# Patient Record
Sex: FEMALE | Race: WHITE | NOT HISPANIC OR LATINO | Employment: FULL TIME | URBAN - NONMETROPOLITAN AREA
[De-identification: names, ages, dates, MRNs, and addresses within clinical notes are randomized per-mention and may not be internally consistent; named-entity substitution may affect disease eponyms.]

---

## 2017-06-24 ENCOUNTER — HOSPITAL ENCOUNTER (EMERGENCY)
Facility: HOSPITAL | Age: 32
Discharge: HOME | End: 2017-06-24
Attending: EMERGENCY MEDICINE | Admitting: EMERGENCY MEDICINE

## 2017-06-24 VITALS
HEIGHT: 63 IN | WEIGHT: 190 LBS | DIASTOLIC BLOOD PRESSURE: 89 MMHG | TEMPERATURE: 98.5 F | BODY MASS INDEX: 33.66 KG/M2 | RESPIRATION RATE: 22 BRPM | SYSTOLIC BLOOD PRESSURE: 122 MMHG | OXYGEN SATURATION: 96 % | HEART RATE: 93 BPM

## 2017-06-24 DIAGNOSIS — T40.1X1A HEROIN OVERDOSE, ACCIDENTAL OR UNINTENTIONAL, INITIAL ENCOUNTER (HCC): Primary | ICD-10-CM

## 2017-06-24 PROCEDURE — 99283 EMERGENCY DEPT VISIT LOW MDM: CPT

## 2017-06-24 RX ORDER — DIVALPROEX SODIUM 500 MG/1
500 TABLET, DELAYED RELEASE ORAL 2 TIMES DAILY
Qty: 30 TABLET | Refills: 0 | Status: SHIPPED | OUTPATIENT
Start: 2017-06-24 | End: 2021-02-09

## 2017-06-24 RX ORDER — DIVALPROEX SODIUM 500 MG/1
500 TABLET, DELAYED RELEASE ORAL 2 TIMES DAILY
COMMUNITY
End: 2017-06-24

## 2017-06-24 NOTE — ED PROVIDER NOTES
Subjective   History of Present Illness   31F w/ hx of bipolar d/o, prior substance abuse bib ems after heroin overdose.  Patient states that she did heroin for the first time in 10 months.  EMS found unresponsive with agonal breathing and gave 2 mg of Narcan she became alert, oriented and appropriate within 5 minutes.  She denies any other ingestions or complaints.  However, she does state that she takes Depakote daily for seizures and bipolar disorder and ran out 2 days ago.  Has not had any seizures.  Unable to see a new provider for 2 weeks.  She asked if they could fill a prescription for her and they said they will have to see her first.    Review of Systems   Psychiatric/Behavioral: Positive for behavioral problems.   All other systems reviewed and are negative.      Past Medical History:   Diagnosis Date   • Bipolar affective disorder, manic    • Hepatitis C    • Seizures    • Tachycardia    • White matter abnormality on MRI of brain        Allergies   Allergen Reactions   • Iodine Hives   • Latex Hives       Past Surgical History:   Procedure Laterality Date   • DILATATION AND CURETTAGE     • TONSILLECTOMY     • TUBAL ABDOMINAL LIGATION         History reviewed. No pertinent family history.    Social History     Social History   • Marital status: N/A     Spouse name: N/A   • Number of children: N/A   • Years of education: N/A     Social History Main Topics   • Smoking status: Current Every Day Smoker     Packs/day: 0.50     Types: Cigarettes   • Smokeless tobacco: None   • Alcohol use No   • Drug use: Yes     Special: IV      Comment: been clean from heroin for 10mths, relapsed today.    • Sexual activity: Not Asked     Other Topics Concern   • None     Social History Narrative   • None           Objective   Physical Exam   Constitutional: She is oriented to person, place, and time. She appears well-developed and well-nourished. No distress.   HENT:   Head: Normocephalic.   Mouth/Throat: Oropharynx is clear  and moist.   Eyes: Pupils are equal, round, and reactive to light. No scleral icterus.   Neck: Neck supple. No tracheal deviation present.   Cardiovascular: Normal rate, regular rhythm, normal heart sounds and intact distal pulses.  Exam reveals no gallop and no friction rub.    No murmur heard.  Pulmonary/Chest: Effort normal and breath sounds normal. No stridor. No respiratory distress. She has no wheezes. She has no rales. She exhibits no tenderness.   Abdominal: Soft. She exhibits no distension and no mass. There is no tenderness. There is no rebound and no guarding. No hernia.   Musculoskeletal: She exhibits no edema or deformity.   Neurological: She is alert and oriented to person, place, and time.   Skin: Skin is warm and dry. She is not diaphoretic. No erythema. No pallor.   Psychiatric: She has a normal mood and affect. Her behavior is normal.   Nursing note and vitals reviewed.      Procedures         ED Course  ED Course                  MDM   31-year-old female here after heroin overdose.  Afebrile, vital signs stable.  Appears well.  Will monitor further and if patient remains stable will discharge home.  We'll give a prescription for Depakote as well.    2:13 PM PT has remained stable, awake, alert and oriented and from the bathroom without difficulty.  Tolerating by mouth.  We'll discharge home with strong recommendation not to use heroin any longer.  Discussed strict return to care precautions.    Final diagnoses:   Heroin overdose, accidental or unintentional, initial encounter            Suhail Suarez MD  06/24/17 9362

## 2017-08-30 ENCOUNTER — TRANSCRIBE ORDERS (OUTPATIENT)
Dept: ADMINISTRATIVE | Facility: HOSPITAL | Age: 32
End: 2017-08-30

## 2017-08-30 ENCOUNTER — APPOINTMENT (OUTPATIENT)
Dept: LAB | Facility: HOSPITAL | Age: 32
End: 2017-08-30
Attending: INTERNAL MEDICINE

## 2017-08-30 DIAGNOSIS — R80.9 PROTEINURIA: ICD-10-CM

## 2017-08-30 DIAGNOSIS — Z13.1 DM (DIABETES MELLITUS SCREEN): ICD-10-CM

## 2017-08-30 DIAGNOSIS — E78.00 HIGH CHOLESTEROL: ICD-10-CM

## 2017-08-30 DIAGNOSIS — N39.0 URINARY TRACT INFECTION, SITE UNSPECIFIED: Primary | ICD-10-CM

## 2017-08-30 DIAGNOSIS — I47.1 SVT (SUPRAVENTRICULAR TACHYCARDIA) (HCC): ICD-10-CM

## 2017-08-30 LAB
CHOLEST SERPL-MCNC: 136 MG/DL (ref 0–199)
HBA1C MFR BLD: 5.2 % (ref 3–6)
HDLC SERPL-MCNC: 38 MG/DL (ref 40–60)
LDLC SERPL CALC-MCNC: 66 MG/DL (ref 0–99)
LDLC/HDLC SERPL: 1.73 {RATIO}
T4 FREE SERPL-MCNC: 1.09 NG/DL (ref 0.78–2.19)
TRIGL SERPL-MCNC: 162 MG/DL
TSH SERPL DL<=0.05 MIU/L-ACNC: 0.89 MIU/ML (ref 0.47–4.68)
VLDLC SERPL-MCNC: 32.4 MG/DL

## 2017-08-30 PROCEDURE — 83036 HEMOGLOBIN GLYCOSYLATED A1C: CPT | Performed by: INTERNAL MEDICINE

## 2017-08-30 PROCEDURE — 36415 COLL VENOUS BLD VENIPUNCTURE: CPT | Performed by: INTERNAL MEDICINE

## 2017-08-30 PROCEDURE — 80061 LIPID PANEL: CPT | Performed by: INTERNAL MEDICINE

## 2017-08-30 PROCEDURE — G0480 DRUG TEST DEF 1-7 CLASSES: HCPCS | Performed by: INTERNAL MEDICINE

## 2017-08-30 PROCEDURE — 84443 ASSAY THYROID STIM HORMONE: CPT | Performed by: INTERNAL MEDICINE

## 2017-08-30 PROCEDURE — 84439 ASSAY OF FREE THYROXINE: CPT | Performed by: INTERNAL MEDICINE

## 2017-09-22 LAB
BENZTHIAZIDE UR QL: NEGATIVE
BUMETANIDE UR QL: NEGATIVE
CHLOROTHIAZIDE UR QL: NEGATIVE
CHLORTHALIDONE UR QL: NEGATIVE
DIURETICS UR SCN-IMP: NEGATIVE
FUROSEMIDE UR QL: NEGATIVE
HTCZ UR QL: NEGATIVE
HYDROFLUMETHIAZIDE UR QL: NEGATIVE
METOLAZONE UR QL: NEGATIVE

## 2018-02-11 ENCOUNTER — APPOINTMENT (OUTPATIENT)
Dept: GENERAL RADIOLOGY | Facility: HOSPITAL | Age: 33
End: 2018-02-11

## 2018-02-11 ENCOUNTER — HOSPITAL ENCOUNTER (EMERGENCY)
Facility: HOSPITAL | Age: 33
Discharge: HOME | End: 2018-02-11
Attending: STUDENT IN AN ORGANIZED HEALTH CARE EDUCATION/TRAINING PROGRAM | Admitting: STUDENT IN AN ORGANIZED HEALTH CARE EDUCATION/TRAINING PROGRAM

## 2018-02-11 ENCOUNTER — APPOINTMENT (OUTPATIENT)
Dept: ULTRASOUND IMAGING | Facility: HOSPITAL | Age: 33
End: 2018-02-11

## 2018-02-11 VITALS
TEMPERATURE: 98.1 F | HEART RATE: 61 BPM | BODY MASS INDEX: 44.44 KG/M2 | OXYGEN SATURATION: 95 % | DIASTOLIC BLOOD PRESSURE: 53 MMHG | HEIGHT: 57 IN | WEIGHT: 206 LBS | SYSTOLIC BLOOD PRESSURE: 95 MMHG | RESPIRATION RATE: 17 BRPM

## 2018-02-11 DIAGNOSIS — S93.492A HIGH ANKLE SPRAIN OF LEFT LOWER EXTREMITY, INITIAL ENCOUNTER: ICD-10-CM

## 2018-02-11 DIAGNOSIS — R60.0 BILATERAL LOWER EXTREMITY EDEMA: Primary | ICD-10-CM

## 2018-02-11 LAB
ALBUMIN SERPL-MCNC: 3.6 G/DL (ref 3.5–5)
ALBUMIN/GLOB SERPL: 1.2 G/DL (ref 1–2)
ALP SERPL-CCNC: 59 U/L (ref 38–126)
ALT SERPL W P-5'-P-CCNC: 67 U/L (ref 13–69)
AMPHET+METHAMPHET UR QL: NEGATIVE
AMPHETAMINES UR QL: NEGATIVE
ANION GAP SERPL CALCULATED.3IONS-SCNC: 13.4 MMOL/L
AST SERPL-CCNC: 49 U/L (ref 15–46)
BARBITURATES UR QL SCN: NEGATIVE
BASOPHILS # BLD AUTO: 0.03 10*3/MM3 (ref 0–0.2)
BASOPHILS NFR BLD AUTO: 0.4 % (ref 0–2.5)
BENZODIAZ UR QL SCN: NEGATIVE
BILIRUB SERPL-MCNC: 0.7 MG/DL (ref 0.2–1.3)
BILIRUB UR QL STRIP: ABNORMAL
BUN BLD-MCNC: 15 MG/DL (ref 7–20)
BUN/CREAT SERPL: 21.4 (ref 7.1–23.5)
BUPRENORPHINE SERPL-MCNC: POSITIVE NG/ML
CALCIUM SPEC-SCNC: 9.2 MG/DL (ref 8.4–10.2)
CANNABINOIDS SERPL QL: NEGATIVE
CHLORIDE SERPL-SCNC: 109 MMOL/L (ref 98–107)
CLARITY UR: ABNORMAL
CO2 SERPL-SCNC: 25 MMOL/L (ref 26–30)
COCAINE UR QL: NEGATIVE
COLOR UR: YELLOW
CREAT BLD-MCNC: 0.7 MG/DL (ref 0.6–1.3)
CRP SERPL-MCNC: 0.9 MG/DL (ref 0–1)
DEPRECATED RDW RBC AUTO: 41.1 FL (ref 37–54)
EOSINOPHIL # BLD AUTO: 0.15 10*3/MM3 (ref 0–0.7)
EOSINOPHIL NFR BLD AUTO: 1.9 % (ref 0–7)
ERYTHROCYTE [DISTWIDTH] IN BLOOD BY AUTOMATED COUNT: 11.8 % (ref 11.5–14.5)
ERYTHROCYTE [SEDIMENTATION RATE] IN BLOOD: 4 MM/HR (ref 0–20)
GFR SERPL CREATININE-BSD FRML MDRD: 96 ML/MIN/1.73
GLOBULIN UR ELPH-MCNC: 3 GM/DL
GLUCOSE BLD-MCNC: 83 MG/DL (ref 74–98)
GLUCOSE UR STRIP-MCNC: NEGATIVE MG/DL
HCT VFR BLD AUTO: 39.4 % (ref 37–47)
HGB BLD-MCNC: 14.1 G/DL (ref 12–16)
HGB UR QL STRIP.AUTO: NEGATIVE
IMM GRANULOCYTES # BLD: 0.03 10*3/MM3 (ref 0–0.06)
IMM GRANULOCYTES NFR BLD: 0.4 % (ref 0–0.6)
KETONES UR QL STRIP: ABNORMAL
LEUKOCYTE ESTERASE UR QL STRIP.AUTO: NEGATIVE
LYMPHOCYTES # BLD AUTO: 2.93 10*3/MM3 (ref 0.6–3.4)
LYMPHOCYTES NFR BLD AUTO: 36.8 % (ref 10–50)
MCH RBC QN AUTO: 34.1 PG (ref 27–31)
MCHC RBC AUTO-ENTMCNC: 35.8 G/DL (ref 30–37)
MCV RBC AUTO: 95.4 FL (ref 81–99)
METHADONE UR QL SCN: NEGATIVE
MONOCYTES # BLD AUTO: 0.79 10*3/MM3 (ref 0–0.9)
MONOCYTES NFR BLD AUTO: 9.9 % (ref 0–12)
NEUTROPHILS # BLD AUTO: 4.03 10*3/MM3 (ref 2–6.9)
NEUTROPHILS NFR BLD AUTO: 50.6 % (ref 37–80)
NITRITE UR QL STRIP: NEGATIVE
NRBC BLD MANUAL-RTO: 0 /100 WBC (ref 0–0)
NT-PROBNP SERPL-MCNC: 518 PG/ML (ref 0–125)
OPIATES UR QL: NEGATIVE
OXYCODONE UR QL SCN: NEGATIVE
PCP UR QL SCN: NEGATIVE
PH UR STRIP.AUTO: 6.5 [PH] (ref 5–8)
PLATELET # BLD AUTO: 171 10*3/MM3 (ref 130–400)
PMV BLD AUTO: 10.1 FL (ref 6–12)
POTASSIUM BLD-SCNC: 4.4 MMOL/L (ref 3.5–5.1)
PROPOXYPH UR QL: NEGATIVE
PROT SERPL-MCNC: 6.6 G/DL (ref 6.3–8.2)
PROT UR QL STRIP: ABNORMAL
RBC # BLD AUTO: 4.13 10*6/MM3 (ref 4.2–5.4)
SODIUM BLD-SCNC: 143 MMOL/L (ref 137–145)
SP GR UR STRIP: >=1.03 (ref 1–1.03)
TRICYCLICS UR QL SCN: NEGATIVE
URATE SERPL-MCNC: 5.9 MG/DL (ref 2.5–8.5)
UROBILINOGEN UR QL STRIP: ABNORMAL
WBC NRBC COR # BLD: 7.96 10*3/MM3 (ref 4.8–10.8)

## 2018-02-11 PROCEDURE — 80053 COMPREHEN METABOLIC PANEL: CPT | Performed by: PHYSICIAN ASSISTANT

## 2018-02-11 PROCEDURE — 25010000002 KETOROLAC TROMETHAMINE PER 15 MG: Performed by: PHYSICIAN ASSISTANT

## 2018-02-11 PROCEDURE — 73630 X-RAY EXAM OF FOOT: CPT

## 2018-02-11 PROCEDURE — 93970 EXTREMITY STUDY: CPT

## 2018-02-11 PROCEDURE — 73610 X-RAY EXAM OF ANKLE: CPT

## 2018-02-11 PROCEDURE — 84550 ASSAY OF BLOOD/URIC ACID: CPT | Performed by: PHYSICIAN ASSISTANT

## 2018-02-11 PROCEDURE — 85651 RBC SED RATE NONAUTOMATED: CPT | Performed by: PHYSICIAN ASSISTANT

## 2018-02-11 PROCEDURE — 99284 EMERGENCY DEPT VISIT MOD MDM: CPT

## 2018-02-11 PROCEDURE — 25010000002 METHYLPREDNISOLONE PER 125 MG: Performed by: PHYSICIAN ASSISTANT

## 2018-02-11 PROCEDURE — 80306 DRUG TEST PRSMV INSTRMNT: CPT | Performed by: PHYSICIAN ASSISTANT

## 2018-02-11 PROCEDURE — 96372 THER/PROPH/DIAG INJ SC/IM: CPT

## 2018-02-11 PROCEDURE — 81003 URINALYSIS AUTO W/O SCOPE: CPT | Performed by: PHYSICIAN ASSISTANT

## 2018-02-11 PROCEDURE — 85025 COMPLETE CBC W/AUTO DIFF WBC: CPT | Performed by: PHYSICIAN ASSISTANT

## 2018-02-11 PROCEDURE — 36415 COLL VENOUS BLD VENIPUNCTURE: CPT

## 2018-02-11 PROCEDURE — 83880 ASSAY OF NATRIURETIC PEPTIDE: CPT | Performed by: PHYSICIAN ASSISTANT

## 2018-02-11 PROCEDURE — 86140 C-REACTIVE PROTEIN: CPT | Performed by: PHYSICIAN ASSISTANT

## 2018-02-11 RX ORDER — PREDNISONE 20 MG/1
20 TABLET ORAL 2 TIMES DAILY
Qty: 10 TABLET | Refills: 0 | Status: SHIPPED | OUTPATIENT
Start: 2018-02-11 | End: 2018-10-18

## 2018-02-11 RX ORDER — GABAPENTIN 100 MG/1
300 CAPSULE ORAL DAILY
Status: DISCONTINUED | OUTPATIENT
Start: 2018-02-11 | End: 2018-02-11 | Stop reason: HOSPADM

## 2018-02-11 RX ORDER — KETOROLAC TROMETHAMINE 30 MG/ML
30 INJECTION, SOLUTION INTRAMUSCULAR; INTRAVENOUS ONCE
Status: COMPLETED | OUTPATIENT
Start: 2018-02-11 | End: 2018-02-11

## 2018-02-11 RX ORDER — METHYLPREDNISOLONE SODIUM SUCCINATE 125 MG/2ML
125 INJECTION, POWDER, LYOPHILIZED, FOR SOLUTION INTRAMUSCULAR; INTRAVENOUS ONCE
Status: COMPLETED | OUTPATIENT
Start: 2018-02-11 | End: 2018-02-11

## 2018-02-11 RX ADMIN — GABAPENTIN 300 MG: 100 CAPSULE ORAL at 16:40

## 2018-02-11 RX ADMIN — KETOROLAC TROMETHAMINE 30 MG: 30 INJECTION, SOLUTION INTRAMUSCULAR at 17:22

## 2018-02-11 RX ADMIN — METHYLPREDNISOLONE SODIUM SUCCINATE 125 MG: 125 INJECTION, POWDER, FOR SOLUTION INTRAMUSCULAR; INTRAVENOUS at 17:23

## 2018-02-11 NOTE — ED NOTES
PT REFUSED TO GIVE URINE SAMPLE AT THIS TIME, STATED SHE WOULD TRY LATER.      Pratima Calderón, RN  02/11/18 5015

## 2018-02-11 NOTE — ED PROVIDER NOTES
Subjective   HPI Comments: This is a 32-year-old female comes in with complaints of lower extremity edema for the last 5 days.  Patient also reports having left foot and ankle pain after rolling her ankle twice over the last 5 days.  Patient does state she has a history of rheumatoid arthritis, fibromyalgia hepatitis C.  She denies any associated fever, chills, shortness of breath, chest pain.    Patient is a 32 y.o. female presenting with lower extremity pain.   History provided by:  Patient   used: No    Lower Extremity Issue   Location:  Leg  Time since incident:  5 days  Injury: no    Leg location:  L lower leg and R lower leg  Pain details:     Quality:  Aching, shooting and throbbing    Severity:  Moderate    Onset quality:  Sudden    Duration:  5 days    Timing:  Constant    Progression:  Worsening  Chronicity:  New  Dislocation: no    Foreign body present:  No foreign bodies  Tetanus status:  Unknown  Relieved by:  Nothing  Worsened by:  Nothing  Associated symptoms: swelling    Associated symptoms: no back pain, no decreased ROM, no fatigue, no numbness and no stiffness    Risk factors: no concern for non-accidental trauma, no frequent fractures, no obesity and no recent illness        Review of Systems   Constitutional: Negative for fatigue.   Musculoskeletal: Positive for joint swelling. Negative for back pain, myalgias and stiffness.   Skin: Negative for rash and wound.   All other systems reviewed and are negative.      Past Medical History:   Diagnosis Date   • Bipolar affective disorder, manic    • Fibromyalgia syndrome    • Hepatitis C    • RA (rheumatoid arthritis)    • Seizures    • Tachycardia    • White matter abnormality on MRI of brain        Allergies   Allergen Reactions   • Iodine Hives   • Latex Hives       Past Surgical History:   Procedure Laterality Date   • DILATATION AND CURETTAGE     • TONSILLECTOMY     • TUBAL ABDOMINAL LIGATION         History reviewed. No  pertinent family history.    Social History     Social History   • Marital status:      Spouse name: N/A   • Number of children: N/A   • Years of education: N/A     Social History Main Topics   • Smoking status: Current Every Day Smoker     Packs/day: 1.50     Types: Cigarettes   • Smokeless tobacco: Never Used   • Alcohol use No   • Drug use: No      Comment: been clean from heroin for 1 YEAR 3 MONTHS, relapsed today.    • Sexual activity: Not Asked     Other Topics Concern   • None     Social History Narrative   • None           Objective   Physical Exam   Constitutional: She is oriented to person, place, and time. She appears well-developed and well-nourished.   HENT:   Head: Normocephalic.   Right Ear: External ear normal.   Left Ear: External ear normal.   Nose: Nose normal.   Mouth/Throat: Oropharynx is clear and moist.   Eyes: Conjunctivae and EOM are normal. Pupils are equal, round, and reactive to light.   Neck: Normal range of motion. Neck supple. No tracheal deviation present. No thyromegaly present.   Cardiovascular: Normal rate, regular rhythm, normal heart sounds and intact distal pulses.    Pulmonary/Chest: Effort normal and breath sounds normal.   Abdominal: Soft. Bowel sounds are normal.   Musculoskeletal: She exhibits edema and tenderness.        Right lower leg: She exhibits tenderness, swelling and edema.        Left lower leg: She exhibits tenderness, swelling and edema.        Left foot: There is decreased range of motion and tenderness.   Neurological: She is alert and oriented to person, place, and time. She has normal reflexes.   Skin: Skin is warm and dry.   Psychiatric: She has a normal mood and affect. Her behavior is normal. Judgment and thought content normal.   Nursing note and vitals reviewed.      Procedures         ED Course  ED Course   Comment By Time   This is a 32-year-old female comes in with chief complaint bilateral lower extremity pain and swelling for the past  several weeks.  She also reports having left ankle pain after rolling her ankle ×2 over the past 5 days.  Patient does have significant history for fibromyalgia, rheumatoid arthritis.  Patient did have negative workup for any acute fracture or DVT. Patient advised to follow-up with pcp for further evaluation and continue her lasix for bilateral lower extremity edema. Saeed Shea PA-C 02/11 1635                  MDM  Number of Diagnoses or Management Options     Amount and/or Complexity of Data Reviewed  Clinical lab tests: reviewed and ordered  Tests in the radiology section of CPT®: ordered and reviewed  Independent visualization of images, tracings, or specimens: yes    Risk of Complications, Morbidity, and/or Mortality  Presenting problems: moderate  Diagnostic procedures: moderate  Management options: moderate    Patient Progress  Patient progress: stable      Final diagnoses:   Bilateral lower extremity edema   High ankle sprain of left lower extremity, initial encounter            Saeed Shea PA-C  02/11/18 1925

## 2018-04-14 ENCOUNTER — APPOINTMENT (OUTPATIENT)
Dept: GENERAL RADIOLOGY | Facility: HOSPITAL | Age: 33
End: 2018-04-14

## 2018-04-14 ENCOUNTER — HOSPITAL ENCOUNTER (EMERGENCY)
Facility: HOSPITAL | Age: 33
Discharge: HOME | End: 2018-04-14
Attending: EMERGENCY MEDICINE | Admitting: EMERGENCY MEDICINE

## 2018-04-14 VITALS
BODY MASS INDEX: 40.51 KG/M2 | WEIGHT: 187.8 LBS | HEART RATE: 76 BPM | OXYGEN SATURATION: 99 % | SYSTOLIC BLOOD PRESSURE: 118 MMHG | DIASTOLIC BLOOD PRESSURE: 71 MMHG | HEIGHT: 57 IN | RESPIRATION RATE: 16 BRPM | TEMPERATURE: 98.1 F

## 2018-04-14 DIAGNOSIS — M25.50 ARTHRALGIA, UNSPECIFIED JOINT: Primary | ICD-10-CM

## 2018-04-14 DIAGNOSIS — M25.511 RIGHT SHOULDER PAIN, UNSPECIFIED CHRONICITY: ICD-10-CM

## 2018-04-14 PROCEDURE — 73110 X-RAY EXAM OF WRIST: CPT

## 2018-04-14 PROCEDURE — 25010000002 KETOROLAC TROMETHAMINE PER 15 MG: Performed by: PHYSICIAN ASSISTANT

## 2018-04-14 PROCEDURE — 99283 EMERGENCY DEPT VISIT LOW MDM: CPT

## 2018-04-14 PROCEDURE — 63710000001 PREDNISONE PER 1 MG: Performed by: PHYSICIAN ASSISTANT

## 2018-04-14 PROCEDURE — 96372 THER/PROPH/DIAG INJ SC/IM: CPT

## 2018-04-14 PROCEDURE — 73030 X-RAY EXAM OF SHOULDER: CPT

## 2018-04-14 RX ORDER — PREDNISONE 20 MG/1
20 TABLET ORAL 2 TIMES DAILY
Qty: 10 TABLET | Refills: 0 | Status: SHIPPED | OUTPATIENT
Start: 2018-04-14 | End: 2018-10-18

## 2018-04-14 RX ORDER — SENNOSIDES 8.6 MG
650 CAPSULE ORAL EVERY 8 HOURS PRN
Qty: 15 TABLET | Refills: 0 | Status: SHIPPED | OUTPATIENT
Start: 2018-04-14

## 2018-04-14 RX ORDER — ONDANSETRON 4 MG/1
4 TABLET, ORALLY DISINTEGRATING ORAL ONCE
Status: COMPLETED | OUTPATIENT
Start: 2018-04-14 | End: 2018-04-14

## 2018-04-14 RX ORDER — KETOROLAC TROMETHAMINE 30 MG/ML
30 INJECTION, SOLUTION INTRAMUSCULAR; INTRAVENOUS ONCE
Status: COMPLETED | OUTPATIENT
Start: 2018-04-14 | End: 2018-04-14

## 2018-04-14 RX ORDER — PREDNISONE 20 MG/1
40 TABLET ORAL ONCE
Status: COMPLETED | OUTPATIENT
Start: 2018-04-14 | End: 2018-04-14

## 2018-04-14 RX ORDER — HYDROCODONE BITARTRATE AND ACETAMINOPHEN 5; 325 MG/1; MG/1
1 TABLET ORAL ONCE
Status: COMPLETED | OUTPATIENT
Start: 2018-04-14 | End: 2018-04-14

## 2018-04-14 RX ADMIN — ONDANSETRON 4 MG: 4 TABLET, ORALLY DISINTEGRATING ORAL at 18:17

## 2018-04-14 RX ADMIN — HYDROCODONE BITARTRATE AND ACETAMINOPHEN 1 TABLET: 5; 325 TABLET ORAL at 18:17

## 2018-04-14 RX ADMIN — PREDNISONE 40 MG: 20 TABLET ORAL at 19:25

## 2018-04-14 RX ADMIN — KETOROLAC TROMETHAMINE 30 MG: 30 INJECTION, SOLUTION INTRAMUSCULAR at 18:18

## 2018-04-14 NOTE — ED NOTES
PT STATES THAT MOST OF HER PAIN IS IN HER R ARM, HAND AND UP INTO HER NECK.      Fifi Lraa, RN  04/14/18 8494

## 2018-04-14 NOTE — ED PROVIDER NOTES
Subjective   Patient is here with flareup of her arthritis describes history of rheumatoid arthritis she used to take methotrexate  Denies chest pain shortness of air no fevers chills recently has been traveling using her right upper arm with complaint of soreness in the right shoulder right wrist area patient just finished her menstrual cycle denies pregnancy patient has been unsuccessful in following up with physician regarding getting back on her medications  She has been out of some of these medicines for the past 4 months  She does endorse history of fibromyalgia        History provided by:  Patient      Review of Systems   Constitutional: Negative.    HENT: Negative.    Eyes: Negative.    Respiratory: Negative.    Cardiovascular: Negative.    Gastrointestinal: Negative.    Genitourinary: Negative.    Musculoskeletal: Positive for arthralgias.   Skin: Negative.    Neurological: Negative.    Psychiatric/Behavioral: Negative.    All other systems reviewed and are negative.      Past Medical History:   Diagnosis Date   • Bipolar affective disorder, manic    • Fibromyalgia syndrome    • Hepatitis C    • RA (rheumatoid arthritis)    • Seizures    • Tachycardia    • White matter abnormality on MRI of brain        Allergies   Allergen Reactions   • Iodine Hives   • Latex Hives       Past Surgical History:   Procedure Laterality Date   • DILATATION AND CURETTAGE     • TONSILLECTOMY     • TUBAL ABDOMINAL LIGATION         History reviewed. No pertinent family history.    Social History     Social History   • Marital status:      Social History Main Topics   • Smoking status: Current Every Day Smoker     Packs/day: 1.50     Types: Cigarettes   • Smokeless tobacco: Never Used   • Alcohol use No   • Drug use: No      Comment: been clean from heroin for 1 YEAR 3 MONTHS, relapsed today.      Other Topics Concern   • Not on file           Objective   Physical Exam   Constitutional: She is oriented to person, place, and  time. She appears well-developed and well-nourished.   Afebrile nontoxic no acute distress   HENT:   Head: Normocephalic.   Eyes: Pupils are equal, round, and reactive to light.   Neck: Normal range of motion.   Cardiovascular: Normal rate, regular rhythm and intact distal pulses.    Pulses:       Radial pulses are 2+ on the right side, and 2+ on the left side.   Pulmonary/Chest: Effort normal.   Musculoskeletal: She exhibits tenderness and deformity. She exhibits no edema.   Tenderness right subacromial fossa discomfort with active range of motion no tenderness in the right elbow.  Skin is warm dry and pink she also has mild tenderness dorsal midline right wrist no edema noted to the right upper extremity... No erythema no edema   Neurological: She is alert and oriented to person, place, and time. She displays normal reflexes. No cranial nerve deficit. She exhibits normal muscle tone.   Skin: Skin is warm and dry. Capillary refill takes less than 2 seconds.   Psychiatric: She has a normal mood and affect. Her behavior is normal. Judgment and thought content normal.   Nursing note and vitals reviewed.      Procedures         ED Course  ED Course   Comment By Time   Patient resting no acute distress... We'll have her follow-up with or so [facility] short course of steroids for the next few days.. Call Monday for appointment... Return here for any problems or concerns otherwise George Carvajal PA-C 04/14 1845                  [facility]    Final diagnoses:   Arthralgia, unspecified joint   Right shoulder pain, unspecified chronicity            George Carvajal PA-C  04/14/18 1930

## 2018-10-18 ENCOUNTER — HOSPITAL ENCOUNTER (EMERGENCY)
Facility: HOSPITAL | Age: 33
Discharge: HOME | End: 2018-10-18
Attending: EMERGENCY MEDICINE | Admitting: EMERGENCY MEDICINE

## 2018-10-18 VITALS
OXYGEN SATURATION: 98 % | RESPIRATION RATE: 14 BRPM | SYSTOLIC BLOOD PRESSURE: 124 MMHG | BODY MASS INDEX: 37.88 KG/M2 | HEIGHT: 57 IN | DIASTOLIC BLOOD PRESSURE: 65 MMHG | TEMPERATURE: 98.4 F | WEIGHT: 175.6 LBS | HEART RATE: 89 BPM

## 2018-10-18 DIAGNOSIS — M25.50 ARTHRALGIA, UNSPECIFIED JOINT: Primary | ICD-10-CM

## 2018-10-18 PROCEDURE — 99282 EMERGENCY DEPT VISIT SF MDM: CPT

## 2018-10-18 PROCEDURE — 25010000002 METHYLPREDNISOLONE PER 125 MG: Performed by: EMERGENCY MEDICINE

## 2018-10-18 PROCEDURE — 96372 THER/PROPH/DIAG INJ SC/IM: CPT

## 2018-10-18 RX ORDER — PREDNISONE 20 MG/1
60 TABLET ORAL DAILY
Qty: 15 TABLET | Refills: 0 | Status: SHIPPED | OUTPATIENT
Start: 2018-10-18 | End: 2018-10-23

## 2018-10-18 RX ORDER — METHYLPREDNISOLONE SODIUM SUCCINATE 125 MG/2ML
80 INJECTION, POWDER, LYOPHILIZED, FOR SOLUTION INTRAMUSCULAR; INTRAVENOUS ONCE
Status: COMPLETED | OUTPATIENT
Start: 2018-10-18 | End: 2018-10-18

## 2018-10-18 RX ADMIN — METHYLPREDNISOLONE SODIUM SUCCINATE 80 MG: 125 INJECTION, POWDER, FOR SOLUTION INTRAMUSCULAR; INTRAVENOUS at 22:31

## 2018-10-19 NOTE — ED PROVIDER NOTES
Subjective   33-year-old female presenting with arthralgias.  Patient states that she has a history of fibromyalgia and rheumatoid arthritis.  States that for the last few days she feels like she has been having a flareup.  She describes multiple joints that have been achy, mildly swollen.  This is made worse by prolonged inactivity.  Improved with activity.  She denies any fevers, chills, nausea, vomiting, skin redness, numbness, weakness.  She tells me that she has been treated successfully with steroids in the past.  She is on methotrexate. She does note that she has an appointment in 4 days with a rheumatologist.            Review of Systems   Constitutional: Negative.    HENT: Negative.    Eyes: Negative.    Respiratory: Negative.    Cardiovascular: Negative.    Gastrointestinal: Negative.    Genitourinary: Negative.    Musculoskeletal: Positive for arthralgias.   Skin: Negative.    Neurological: Negative.    Psychiatric/Behavioral: Negative.        Past Medical History:   Diagnosis Date   • Bipolar affective disorder, manic (CMS/HCC)    • Fibromyalgia syndrome    • Hepatitis C    • RA (rheumatoid arthritis) (CMS/HCC)    • Seizures (CMS/HCC)    • Tachycardia    • White matter abnormality on MRI of brain        Allergies   Allergen Reactions   • Iodine Hives   • Latex Hives       Past Surgical History:   Procedure Laterality Date   • DILATATION AND CURETTAGE     • TONSILLECTOMY     • TUBAL ABDOMINAL LIGATION         History reviewed. No pertinent family history.    Social History     Social History   • Marital status:      Social History Main Topics   • Smoking status: Current Every Day Smoker     Packs/day: 1.50     Types: Cigarettes   • Smokeless tobacco: Never Used   • Alcohol use No   • Drug use: No      Comment: been clean from heroin for 1 YEAR 3 MONTHS, relapsed today.      Other Topics Concern   • Not on file           Objective   Physical Exam   Constitutional: She is oriented to person, place,  and time. She appears well-developed and well-nourished. No distress.   HENT:   Head: Normocephalic and atraumatic.   Right Ear: External ear normal.   Left Ear: External ear normal.   Nose: Nose normal.   Mouth/Throat: Oropharynx is clear and moist.   Eyes: Pupils are equal, round, and reactive to light. Conjunctivae and EOM are normal.   Neck: Normal range of motion. Neck supple.   Cardiovascular: Normal rate, regular rhythm, normal heart sounds and intact distal pulses.    Pulmonary/Chest: Effort normal and breath sounds normal. No respiratory distress.   Abdominal: Soft. Bowel sounds are normal. She exhibits no distension. There is no tenderness. There is no rebound and no guarding.   Musculoskeletal: Normal range of motion. She exhibits no edema, tenderness or deformity.   No focal joint swelling, erythema, warmth or tenderness   Neurological: She is alert and oriented to person, place, and time.   Skin: Skin is warm and dry. No rash noted.   Psychiatric: She has a normal mood and affect. Her behavior is normal.   Nursing note and vitals reviewed.      Procedures           ED Course                  MDM  Number of Diagnoses or Management Options  Arthralgia, unspecified joint:   Diagnosis management comments: 33-year-old female with acute on chronic arthralgias.  We'll develop, well-nourished female in no distress with normal vital signs and exam as above.  No concerning findings on exam.  We'll treat the steroids.  We'll do burst dose to start with, encouraged her to ask her rheumatologist how she should proceed as far as continuation of the steroids.  Otherwise encouraged supportive care.  She's happy with this plan.    DDX: Arthralgias        Final diagnoses:   Arthralgia, unspecified joint            Dionisio Chou MD  10/18/18 3413

## 2018-10-29 ENCOUNTER — OFFICE VISIT (OUTPATIENT)
Dept: ORTHOPEDIC SURGERY | Facility: CLINIC | Age: 33
End: 2018-10-29

## 2018-10-29 VITALS — RESPIRATION RATE: 18 BRPM | WEIGHT: 172 LBS | BODY MASS INDEX: 37.11 KG/M2 | HEIGHT: 57 IN

## 2018-10-29 DIAGNOSIS — L85.1 PLANTAR POROKERATOSIS, ACQUIRED: Primary | ICD-10-CM

## 2018-10-29 PROCEDURE — 99203 OFFICE O/P NEW LOW 30 MIN: CPT | Performed by: PODIATRIST

## 2018-10-29 PROCEDURE — 11055 PARING/CUTG B9 HYPRKER LES 1: CPT | Performed by: PODIATRIST

## 2018-10-29 RX ORDER — FUROSEMIDE 20 MG/1
20 TABLET ORAL DAILY
Refills: 0 | COMMUNITY
Start: 2018-08-08 | End: 2022-10-20 | Stop reason: SDUPTHER

## 2018-10-29 RX ORDER — OMEPRAZOLE 40 MG/1
40 CAPSULE, DELAYED RELEASE ORAL DAILY
Refills: 0 | COMMUNITY
Start: 2018-08-13 | End: 2022-10-20 | Stop reason: SDUPTHER

## 2018-10-29 RX ORDER — MELOXICAM 15 MG/1
TABLET ORAL DAILY
Refills: 0 | COMMUNITY
Start: 2018-09-27 | End: 2020-05-15

## 2018-10-29 RX ORDER — GABAPENTIN 800 MG/1
800 TABLET ORAL 3 TIMES DAILY PRN
Refills: 3 | COMMUNITY
Start: 2018-10-04

## 2018-10-29 RX ORDER — PREDNISONE 10 MG/1
20 TABLET ORAL DAILY
COMMUNITY
Start: 2018-10-26 | End: 2023-03-24

## 2018-10-29 RX ORDER — UREA 40 %
CREAM (GRAM) TOPICAL EVERY 12 HOURS
Qty: 85 G | Refills: 1 | Status: SHIPPED | OUTPATIENT
Start: 2018-10-29 | End: 2021-02-09

## 2018-10-29 RX ORDER — FOLIC ACID 1 MG/1
1 TABLET ORAL DAILY
Refills: 3 | COMMUNITY
Start: 2018-08-13 | End: 2023-03-24

## 2018-10-29 NOTE — PROGRESS NOTES
Subjective   Patient ID: Alicia Atkinson is a 33 y.o. female   Pain of the Left Foot   comes in today with a painful area on the bottom of her left foot.  She states she thought it was a wart.  She is also somewhat concerned about areas on her toenails that are incurvated that she was afraid are becoming ingrown.  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    History of Present Illness    Pain Score: 7  Pain Location: Foot  Pain Orientation: Left     Pain Descriptors: Aching, Burning, Throbbing  Pain Frequency: Constant/continuous  Pain Onset: Ongoing  Date Pain First Started:  (several months)  Clinical Progression: Gradually worsening  Aggravating Factors: Walking, Standing        Pain Intervention(s): Rest  Result of Injury: No  Work-Related Injury: No    Review of Systems   Constitutional: Negative for diaphoresis, fever and unexpected weight change.   HENT: Negative for dental problem and sore throat.    Eyes: Negative for visual disturbance.   Respiratory: Negative for shortness of breath.    Cardiovascular: Negative for chest pain.   Gastrointestinal: Negative for abdominal pain, constipation, diarrhea, nausea and vomiting.   Genitourinary: Negative for difficulty urinating and frequency.   Neurological: Negative for headaches.   Hematological: Does not bruise/bleed easily.   All other systems reviewed and are negative.      Past Medical History:   Diagnosis Date   • Bipolar affective disorder, manic (CMS/HCC)    • Fibromyalgia syndrome    • Hepatitis C    • RA (rheumatoid arthritis) (CMS/HCC)    • Seizures (CMS/HCC)    • Tachycardia    • White matter abnormality on MRI of brain         Past Surgical History:   Procedure Laterality Date   • DILATATION AND CURETTAGE     • TONSILLECTOMY     • TUBAL ABDOMINAL LIGATION         Allergies   Allergen Reactions   • Iodine Hives   • Latex Hives          Current Outpatient Prescriptions:   •  acetaminophen (TYLENOL 8 HOUR) 650 MG 8 hr tablet, Take 1 tablet by mouth Every 8 (Eight) Hours As Needed for Mild Pain ., Disp: 15 tablet, Rfl: 0  •  aspirin 81 MG EC tablet, Take 81 mg by mouth Daily., Disp: , Rfl:   •  busPIRone (BUSPAR) 10 MG tablet, Take 10 mg by mouth 3 (Three) Times a Day., Disp: , Rfl:   •  diclofenac (VOLTAREN) 50 MG EC tablet, Take 1 tablet by mouth 3 (Three) Times a Day., Disp: 60 tablet, Rfl: 0  •  divalproex (DEPAKOTE) 500 MG DR tablet, Take 1 tablet by mouth 2 (Two) Times a Day., Disp: 30 tablet, Rfl: 0  •  folic acid (FOLVITE) 1 MG tablet, Take  by mouth Daily., Disp: , Rfl: 3  •  furosemide (LASIX) 20 MG tablet, Take  by mouth Daily., Disp: , Rfl: 0  •  gabapentin (NEURONTIN) 800 MG tablet, TK 1 T PO TID, Disp: , Rfl: 3  •  meloxicam (MOBIC) 15 MG tablet, Take  by mouth Daily., Disp: , Rfl: 0  •  methotrexate 2.5 MG tablet, TK 3 TS PO ONCE WEEKLY, Disp: , Rfl: 3  •  omeprazole (priLOSEC) 40 MG capsule, TK 1 C PO QD AC, Disp: , Rfl: 0  •  predniSONE (DELTASONE) 10 MG tablet, , Disp: , Rfl:   •  cephalexin (KEFLEX) 500 MG capsule, Take 1 capsule by mouth 3 (Three) Times a Day., Disp: 21 capsule, Rfl: 0  •  urea (CARMOL) 40 % cream, Apply  topically to the appropriate area as directed Every 12 (Twelve) Hours. Apply topically every 12 hours., Disp: 85 g, Rfl: 1    History reviewed. No pertinent family history.    Social History     Social History   • Marital status:      Spouse name: N/A   • Number of children: N/A   • Years of education: N/A     Occupational History   • Not on file.     Social History Main Topics   • Smoking status: Current Every Day Smoker     Packs/day: 1.50     Types: Cigarettes   • Smokeless tobacco: Never Used   • Alcohol use No   • Drug use: No      Comment: been clean from heroin for 1 YEAR 3 MONTHS, relapsed today.    • Sexual activity: Defer     Other Topics Concern   • Not on file     Social History  Narrative   • No narrative on file       Ready to quit: No  Counseling given: Yes       I have reviewed all of the above social hx, family hx, surgical hx, medications, allergies & ROS and confirm that it is accurate.  Objective   Physical Exam   Constitutional: She is oriented to person, place, and time. She appears well-developed and well-nourished.   HENT:   Head: Normocephalic and atraumatic.   Eyes: Pupils are equal, round, and reactive to light. EOM are normal.   Neck: Normal range of motion.   Pulmonary/Chest: Effort normal.   Abdominal: Soft.   Musculoskeletal: Normal range of motion.   Neurological: She is alert and oriented to person, place, and time. She has normal reflexes.   Skin: Skin is warm.   Psychiatric: She has a normal mood and affect. Her behavior is normal. Judgment and thought content normal.   Vitals reviewed.    Ortho Exam  Ortho Exam bilateral Lower extremity exam:  Vascular: Pulses palpable, pedal hair noted, CFT brisk, no edema noted  Neuro: Gross sensation intact  Derm: Normal turgor and temperature, no wounds, sub-met 5 on the left foot there is a deep-seated a rather large firm and hard punctate lesion.  Toenails are somewhat incurvated and the nail edges are slightly firm but no obvious ingrown toenail.  MSK: Joint range of motion normal, manual muscle testing normal, no pain to palpation, no pain with range of motion        Assessment/Plan left foot porokeratosis, possible early ingrown toenails of both great toes  Independent Review of Radiographic Studies:      Laboratory and Other Studies:     Medical Decision Making:        Procedures  With a 15 blade I sharply debrided the left foot lesion and excised the core and treated with Cantharone.    There are no diagnoses linked to this encounter.      Recommendations/Plan:  1.  In regard to the great toenails I recommend we let them be for the time being.  I recommend she soften them and scrubbed them and file them and moisten them.   If they worsen we can address them at that time.  2.  She still leave the area covered on the plantar left foot and I also discussed padding it with circular corn pads or other areas or ways of padding.  I'll prescribe her urea that she should place on this twice daily.  I have her follow up in 2 or 3 weeks and let me reevaluate this    No Follow-up on file.  Patient agreeable to call or return sooner for any concerns.

## 2019-07-01 RX ORDER — OMEPRAZOLE 40 MG/1
CAPSULE, DELAYED RELEASE ORAL
Qty: 30 CAPSULE | OUTPATIENT
Start: 2019-07-01

## 2019-08-23 ENCOUNTER — APPOINTMENT (OUTPATIENT)
Dept: CT IMAGING | Facility: HOSPITAL | Age: 34
End: 2019-08-23

## 2019-08-23 ENCOUNTER — HOSPITAL ENCOUNTER (EMERGENCY)
Facility: HOSPITAL | Age: 34
Discharge: HOME | End: 2019-08-23
Attending: EMERGENCY MEDICINE | Admitting: EMERGENCY MEDICINE

## 2019-08-23 ENCOUNTER — APPOINTMENT (OUTPATIENT)
Dept: ULTRASOUND IMAGING | Facility: HOSPITAL | Age: 34
End: 2019-08-23

## 2019-08-23 VITALS
SYSTOLIC BLOOD PRESSURE: 110 MMHG | WEIGHT: 170 LBS | RESPIRATION RATE: 18 BRPM | BODY MASS INDEX: 35.68 KG/M2 | DIASTOLIC BLOOD PRESSURE: 76 MMHG | OXYGEN SATURATION: 98 % | HEIGHT: 58 IN | HEART RATE: 95 BPM | TEMPERATURE: 97.3 F

## 2019-08-23 DIAGNOSIS — R10.31 RLQ ABDOMINAL PAIN: Primary | ICD-10-CM

## 2019-08-23 LAB
ABO GROUP BLD: NORMAL
ALBUMIN SERPL-MCNC: 4.1 G/DL (ref 3.5–5.2)
ALBUMIN/GLOB SERPL: 1.5 G/DL
ALP SERPL-CCNC: 48 U/L (ref 39–117)
ALT SERPL W P-5'-P-CCNC: 22 U/L (ref 1–33)
ANION GAP SERPL CALCULATED.3IONS-SCNC: 11.5 MMOL/L (ref 5–15)
AST SERPL-CCNC: 25 U/L (ref 1–32)
BACTERIA UR QL AUTO: ABNORMAL /HPF
BASOPHILS # BLD AUTO: 0.04 10*3/MM3 (ref 0–0.2)
BASOPHILS NFR BLD AUTO: 0.6 % (ref 0–1.5)
BILIRUB SERPL-MCNC: 0.8 MG/DL (ref 0.2–1.2)
BILIRUB UR QL STRIP: NEGATIVE
BUN BLD-MCNC: 13 MG/DL (ref 6–20)
BUN/CREAT SERPL: 20.6 (ref 7–25)
CALCIUM SPEC-SCNC: 8.9 MG/DL (ref 8.6–10.5)
CHLORIDE SERPL-SCNC: 106 MMOL/L (ref 98–107)
CLARITY UR: CLEAR
CO2 SERPL-SCNC: 23.5 MMOL/L (ref 22–29)
COLOR UR: YELLOW
CREAT BLD-MCNC: 0.63 MG/DL (ref 0.57–1)
DEPRECATED RDW RBC AUTO: 41.8 FL (ref 37–54)
EOSINOPHIL # BLD AUTO: 0.19 10*3/MM3 (ref 0–0.4)
EOSINOPHIL NFR BLD AUTO: 2.9 % (ref 0.3–6.2)
ERYTHROCYTE [DISTWIDTH] IN BLOOD BY AUTOMATED COUNT: 12 % (ref 12.3–15.4)
GFR SERPL CREATININE-BSD FRML MDRD: 107 ML/MIN/1.73
GLOBULIN UR ELPH-MCNC: 2.7 GM/DL
GLUCOSE BLD-MCNC: 91 MG/DL (ref 65–99)
GLUCOSE UR STRIP-MCNC: NEGATIVE MG/DL
HCG INTACT+B SERPL-ACNC: <0.5 MIU/ML
HCT VFR BLD AUTO: 39.3 % (ref 34–46.6)
HGB BLD-MCNC: 13.9 G/DL (ref 12–15.9)
HGB UR QL STRIP.AUTO: ABNORMAL
HOLD SPECIMEN: NORMAL
HYALINE CASTS UR QL AUTO: ABNORMAL /LPF
IMM GRANULOCYTES # BLD AUTO: 0.04 10*3/MM3 (ref 0–0.05)
IMM GRANULOCYTES NFR BLD AUTO: 0.6 % (ref 0–0.5)
KETONES UR QL STRIP: NEGATIVE
LEUKOCYTE ESTERASE UR QL STRIP.AUTO: NEGATIVE
LYMPHOCYTES # BLD AUTO: 2.38 10*3/MM3 (ref 0.7–3.1)
LYMPHOCYTES NFR BLD AUTO: 36.7 % (ref 19.6–45.3)
MCH RBC QN AUTO: 33.7 PG (ref 26.6–33)
MCHC RBC AUTO-ENTMCNC: 35.4 G/DL (ref 31.5–35.7)
MCV RBC AUTO: 95.2 FL (ref 79–97)
MONOCYTES # BLD AUTO: 0.56 10*3/MM3 (ref 0.1–0.9)
MONOCYTES NFR BLD AUTO: 8.6 % (ref 5–12)
NEUTROPHILS # BLD AUTO: 3.27 10*3/MM3 (ref 1.7–7)
NEUTROPHILS NFR BLD AUTO: 50.6 % (ref 42.7–76)
NITRITE UR QL STRIP: NEGATIVE
NRBC BLD AUTO-RTO: 0 /100 WBC (ref 0–0.2)
PH UR STRIP.AUTO: 6 [PH] (ref 5–8)
PLATELET # BLD AUTO: 197 10*3/MM3 (ref 140–450)
PMV BLD AUTO: 9.8 FL (ref 6–12)
POTASSIUM BLD-SCNC: 3.8 MMOL/L (ref 3.5–5.2)
PROT SERPL-MCNC: 6.8 G/DL (ref 6–8.5)
PROT UR QL STRIP: NEGATIVE
RBC # BLD AUTO: 4.13 10*6/MM3 (ref 3.77–5.28)
RBC # UR: ABNORMAL /HPF
REF LAB TEST METHOD: ABNORMAL
RH BLD: POSITIVE
SODIUM BLD-SCNC: 141 MMOL/L (ref 136–145)
SP GR UR STRIP: 1.02 (ref 1–1.03)
SQUAMOUS #/AREA URNS HPF: ABNORMAL /HPF
UROBILINOGEN UR QL STRIP: ABNORMAL
WBC NRBC COR # BLD: 6.48 10*3/MM3 (ref 3.4–10.8)
WBC UR QL AUTO: ABNORMAL /HPF
WHOLE BLOOD HOLD SPECIMEN: NORMAL
WHOLE BLOOD HOLD SPECIMEN: NORMAL

## 2019-08-23 PROCEDURE — 76817 TRANSVAGINAL US OBSTETRIC: CPT

## 2019-08-23 PROCEDURE — 84702 CHORIONIC GONADOTROPIN TEST: CPT | Performed by: NURSE PRACTITIONER

## 2019-08-23 PROCEDURE — 86901 BLOOD TYPING SEROLOGIC RH(D): CPT | Performed by: NURSE PRACTITIONER

## 2019-08-23 PROCEDURE — 74176 CT ABD & PELVIS W/O CONTRAST: CPT

## 2019-08-23 PROCEDURE — 99284 EMERGENCY DEPT VISIT MOD MDM: CPT

## 2019-08-23 PROCEDURE — 81001 URINALYSIS AUTO W/SCOPE: CPT | Performed by: NURSE PRACTITIONER

## 2019-08-23 PROCEDURE — 80053 COMPREHEN METABOLIC PANEL: CPT | Performed by: NURSE PRACTITIONER

## 2019-08-23 PROCEDURE — 86900 BLOOD TYPING SEROLOGIC ABO: CPT | Performed by: NURSE PRACTITIONER

## 2019-08-23 PROCEDURE — 85025 COMPLETE CBC W/AUTO DIFF WBC: CPT | Performed by: NURSE PRACTITIONER

## 2019-08-23 RX ORDER — ACETAMINOPHEN 325 MG/1
975 TABLET ORAL ONCE
Status: COMPLETED | OUTPATIENT
Start: 2019-08-23 | End: 2019-08-23

## 2019-08-23 RX ORDER — HYDROCODONE BITARTRATE AND ACETAMINOPHEN 5; 325 MG/1; MG/1
1 TABLET ORAL EVERY 8 HOURS PRN
Qty: 8 TABLET | Refills: 0 | Status: SHIPPED | OUTPATIENT
Start: 2019-08-23 | End: 2021-02-09

## 2019-08-23 RX ORDER — SODIUM CHLORIDE 0.9 % (FLUSH) 0.9 %
10 SYRINGE (ML) INJECTION AS NEEDED
Status: DISCONTINUED | OUTPATIENT
Start: 2019-08-23 | End: 2019-08-23 | Stop reason: HOSPADM

## 2019-08-23 RX ADMIN — ACETAMINOPHEN 975 MG: 325 TABLET, FILM COATED ORAL at 19:41

## 2020-05-15 ENCOUNTER — HOSPITAL ENCOUNTER (EMERGENCY)
Facility: HOSPITAL | Age: 35
Discharge: HOME | End: 2020-05-15
Attending: EMERGENCY MEDICINE | Admitting: EMERGENCY MEDICINE

## 2020-05-15 VITALS
DIASTOLIC BLOOD PRESSURE: 92 MMHG | TEMPERATURE: 97.8 F | OXYGEN SATURATION: 96 % | BODY MASS INDEX: 37.03 KG/M2 | SYSTOLIC BLOOD PRESSURE: 136 MMHG | HEIGHT: 58 IN | RESPIRATION RATE: 18 BRPM | HEART RATE: 109 BPM | WEIGHT: 176.4 LBS

## 2020-05-15 DIAGNOSIS — M25.572 ACUTE LEFT ANKLE PAIN: Primary | ICD-10-CM

## 2020-05-15 PROCEDURE — 96372 THER/PROPH/DIAG INJ SC/IM: CPT

## 2020-05-15 PROCEDURE — 25010000002 KETOROLAC TROMETHAMINE PER 15 MG: Performed by: PHYSICIAN ASSISTANT

## 2020-05-15 PROCEDURE — 99282 EMERGENCY DEPT VISIT SF MDM: CPT

## 2020-05-15 PROCEDURE — 25010000002 DEXAMETHASONE SODIUM PHOSPHATE 10 MG/ML SOLUTION: Performed by: PHYSICIAN ASSISTANT

## 2020-05-15 RX ORDER — INDOMETHACIN 50 MG/1
50 CAPSULE ORAL
Qty: 15 CAPSULE | Refills: 0 | Status: SHIPPED | OUTPATIENT
Start: 2020-05-15 | End: 2020-05-20

## 2020-05-15 RX ORDER — KETOROLAC TROMETHAMINE 30 MG/ML
60 INJECTION, SOLUTION INTRAMUSCULAR; INTRAVENOUS ONCE
Status: COMPLETED | OUTPATIENT
Start: 2020-05-15 | End: 2020-05-15

## 2020-05-15 RX ORDER — DEXAMETHASONE SODIUM PHOSPHATE 10 MG/ML
10 INJECTION, SOLUTION INTRAMUSCULAR; INTRAVENOUS ONCE
Status: COMPLETED | OUTPATIENT
Start: 2020-05-15 | End: 2020-05-15

## 2020-05-15 RX ADMIN — DEXAMETHASONE SODIUM PHOSPHATE 10 MG: 10 INJECTION, SOLUTION INTRAMUSCULAR; INTRAVENOUS at 21:29

## 2020-05-15 RX ADMIN — KETOROLAC TROMETHAMINE 60 MG: 30 INJECTION, SOLUTION INTRAMUSCULAR at 21:28

## 2020-05-16 NOTE — ED PROVIDER NOTES
Subjective   This patient has a history of RA and lupus.  She states she is had about 4 days of worsening left ankle pain consistent with an RA/lupus flare.  She takes 10 mg of prednisone daily and has for years but increased it to 20 mg daily for the past 3 days.  She is also on methotrexate.  No history of DVT or PE.  No calf pain or tenderness.          Review of Systems   Constitutional: Negative.    HENT: Negative.    Eyes: Negative.    Respiratory: Negative.    Cardiovascular: Negative.    Gastrointestinal: Negative.    Genitourinary: Negative.    Musculoskeletal:        Left ankle pain   Skin: Negative.    Neurological: Negative.    Psychiatric/Behavioral: Negative.        Past Medical History:   Diagnosis Date   • Bipolar affective disorder, manic (CMS/HCC)    • Fibromyalgia syndrome    • Hepatitis C    • RA (rheumatoid arthritis) (CMS/HCC)    • Seizures (CMS/HCC)    • Tachycardia    • White matter abnormality on MRI of brain        Allergies   Allergen Reactions   • Iodine Hives   • Latex Hives       Past Surgical History:   Procedure Laterality Date   • DILATATION AND CURETTAGE     • TONSILLECTOMY     • TUBAL ABDOMINAL LIGATION         History reviewed. No pertinent family history.    Social History     Socioeconomic History   • Marital status:      Spouse name: Not on file   • Number of children: Not on file   • Years of education: Not on file   • Highest education level: Not on file   Tobacco Use   • Smoking status: Current Every Day Smoker     Packs/day: 1.50     Types: Electronic Cigarette   • Smokeless tobacco: Never Used   Substance and Sexual Activity   • Alcohol use: No   • Drug use: No     Comment: been clean from heroin for 1 YEAR 3 MONTHS, relapsed today.    • Sexual activity: Defer           Objective   Physical Exam   Constitutional: She appears well-developed and well-nourished.   HENT:   Head: Normocephalic and atraumatic.   Neck: Normal range of motion.   Cardiovascular: Normal rate  and regular rhythm.   Pulmonary/Chest: Effort normal.   Musculoskeletal:   Tenderness and soft tissue swelling to the left ankle.  No tenderness to the left calf.  2+ DP pulse on the left.  No cellulitis.  No erythema.  No warmth.   Neurological: She is alert.   Skin: Skin is warm and dry.   Psychiatric: She has a normal mood and affect. Her behavior is normal. Thought content normal.       Procedures           ED Course                                           MDM    Final diagnoses:   Acute left ankle pain            Shaun Rueda PA-C  05/15/20 9327

## 2020-06-17 ENCOUNTER — LAB (OUTPATIENT)
Dept: LAB | Facility: HOSPITAL | Age: 35
End: 2020-06-17

## 2020-06-17 ENCOUNTER — TRANSCRIBE ORDERS (OUTPATIENT)
Dept: LAB | Facility: HOSPITAL | Age: 35
End: 2020-06-17

## 2020-06-17 DIAGNOSIS — Z51.81 ENCOUNTER FOR THERAPEUTIC DRUG MONITORING: ICD-10-CM

## 2020-06-17 DIAGNOSIS — M05.79 RHEUMATOID ARTHRITIS INVOLVING MULTIPLE SITES WITH POSITIVE RHEUMATOID FACTOR (HCC): Primary | ICD-10-CM

## 2020-06-17 DIAGNOSIS — M05.79 RHEUMATOID ARTHRITIS INVOLVING MULTIPLE SITES WITH POSITIVE RHEUMATOID FACTOR (HCC): ICD-10-CM

## 2020-06-17 LAB
ALBUMIN SERPL-MCNC: 3.7 G/DL (ref 3.5–5.2)
ALBUMIN/GLOB SERPL: 1.1 G/DL
ALP SERPL-CCNC: 38 U/L (ref 39–117)
ALT SERPL W P-5'-P-CCNC: 24 U/L (ref 1–33)
ANION GAP SERPL CALCULATED.3IONS-SCNC: 10.1 MMOL/L (ref 5–15)
AST SERPL-CCNC: 16 U/L (ref 1–32)
BASOPHILS # BLD AUTO: 0.04 10*3/MM3 (ref 0–0.2)
BASOPHILS NFR BLD AUTO: 0.6 % (ref 0–1.5)
BILIRUB SERPL-MCNC: 0.4 MG/DL (ref 0.2–1.2)
BUN BLD-MCNC: 11 MG/DL (ref 6–20)
BUN/CREAT SERPL: 14.1 (ref 7–25)
CALCIUM SPEC-SCNC: 9.5 MG/DL (ref 8.6–10.5)
CHLORIDE SERPL-SCNC: 99 MMOL/L (ref 98–107)
CO2 SERPL-SCNC: 25.9 MMOL/L (ref 22–29)
CREAT BLD-MCNC: 0.78 MG/DL (ref 0.57–1)
DEPRECATED RDW RBC AUTO: 41.4 FL (ref 37–54)
EOSINOPHIL # BLD AUTO: 0.3 10*3/MM3 (ref 0–0.4)
EOSINOPHIL NFR BLD AUTO: 4.8 % (ref 0.3–6.2)
ERYTHROCYTE [DISTWIDTH] IN BLOOD BY AUTOMATED COUNT: 11.9 % (ref 12.3–15.4)
GFR SERPL CREATININE-BSD FRML MDRD: 84 ML/MIN/1.73
GLOBULIN UR ELPH-MCNC: 3.4 GM/DL
GLUCOSE BLD-MCNC: 91 MG/DL (ref 65–99)
HBV SURFACE AG SERPL QL IA: NORMAL
HCT VFR BLD AUTO: 39.6 % (ref 34–46.6)
HGB BLD-MCNC: 13.9 G/DL (ref 12–15.9)
IMM GRANULOCYTES # BLD AUTO: 0.01 10*3/MM3 (ref 0–0.05)
IMM GRANULOCYTES NFR BLD AUTO: 0.2 % (ref 0–0.5)
LYMPHOCYTES # BLD AUTO: 2.66 10*3/MM3 (ref 0.7–3.1)
LYMPHOCYTES NFR BLD AUTO: 42.2 % (ref 19.6–45.3)
MCH RBC QN AUTO: 33.7 PG (ref 26.6–33)
MCHC RBC AUTO-ENTMCNC: 35.1 G/DL (ref 31.5–35.7)
MCV RBC AUTO: 96.1 FL (ref 79–97)
MONOCYTES # BLD AUTO: 0.49 10*3/MM3 (ref 0.1–0.9)
MONOCYTES NFR BLD AUTO: 7.8 % (ref 5–12)
NEUTROPHILS # BLD AUTO: 2.8 10*3/MM3 (ref 1.7–7)
NEUTROPHILS NFR BLD AUTO: 44.4 % (ref 42.7–76)
NRBC BLD AUTO-RTO: 0 /100 WBC (ref 0–0.2)
PLATELET # BLD AUTO: 279 10*3/MM3 (ref 140–450)
PMV BLD AUTO: 10.3 FL (ref 6–12)
POTASSIUM BLD-SCNC: 4.2 MMOL/L (ref 3.5–5.2)
PROT SERPL-MCNC: 7.1 G/DL (ref 6–8.5)
RBC # BLD AUTO: 4.12 10*6/MM3 (ref 3.77–5.28)
SODIUM BLD-SCNC: 135 MMOL/L (ref 136–145)
WBC NRBC COR # BLD: 6.3 10*3/MM3 (ref 3.4–10.8)

## 2020-06-17 PROCEDURE — 86162 COMPLEMENT TOTAL (CH50): CPT

## 2020-06-17 PROCEDURE — 82570 ASSAY OF URINE CREATININE: CPT

## 2020-06-17 PROCEDURE — 86160 COMPLEMENT ANTIGEN: CPT

## 2020-06-17 PROCEDURE — 87340 HEPATITIS B SURFACE AG IA: CPT

## 2020-06-17 PROCEDURE — 80053 COMPREHEN METABOLIC PANEL: CPT

## 2020-06-17 PROCEDURE — 86235 NUCLEAR ANTIGEN ANTIBODY: CPT

## 2020-06-17 PROCEDURE — 86480 TB TEST CELL IMMUN MEASURE: CPT

## 2020-06-17 PROCEDURE — 84156 ASSAY OF PROTEIN URINE: CPT

## 2020-06-17 PROCEDURE — 85652 RBC SED RATE AUTOMATED: CPT

## 2020-06-17 PROCEDURE — 86803 HEPATITIS C AB TEST: CPT

## 2020-06-17 PROCEDURE — 85025 COMPLETE CBC W/AUTO DIFF WBC: CPT

## 2020-06-17 PROCEDURE — 86038 ANTINUCLEAR ANTIBODIES: CPT

## 2020-06-17 PROCEDURE — 82164 ANGIOTENSIN I ENZYME TEST: CPT

## 2020-06-17 PROCEDURE — 36415 COLL VENOUS BLD VENIPUNCTURE: CPT

## 2020-06-17 PROCEDURE — 86255 FLUORESCENT ANTIBODY SCREEN: CPT

## 2020-06-18 LAB
C3 SERPL-MCNC: 112 MG/DL (ref 82–167)
C4 SERPL-MCNC: 26 MG/DL (ref 14–44)
CH50 SERPL-ACNC: 48 U/ML
CREAT UR-MCNC: 62.6 MG/DL
ERYTHROCYTE [SEDIMENTATION RATE] IN BLOOD: 9 MM/HR (ref 0–20)
HCV AB SER DONR QL: REACTIVE
HOLD SPECIMEN: NORMAL
PROT UR-MCNC: 5 MG/DL
SPECIMEN STATUS: NORMAL

## 2020-06-19 ENCOUNTER — HOSPITAL ENCOUNTER (EMERGENCY)
Facility: HOSPITAL | Age: 35
Discharge: HOME | End: 2020-06-20
Attending: EMERGENCY MEDICINE | Admitting: EMERGENCY MEDICINE

## 2020-06-19 DIAGNOSIS — R11.2 NON-INTRACTABLE VOMITING WITH NAUSEA, UNSPECIFIED VOMITING TYPE: Primary | ICD-10-CM

## 2020-06-19 DIAGNOSIS — M79.10 MYALGIA: ICD-10-CM

## 2020-06-19 LAB
ACE SERPL-CCNC: <15 U/L (ref 14–82)
ANA SER QL IA: NEGATIVE
BACTERIA UR QL AUTO: ABNORMAL /HPF
BILIRUB UR QL STRIP: NEGATIVE
CLARITY UR: CLEAR
COLOR UR: YELLOW
ENA SS-A AB SER-ACNC: <0.2 AI (ref 0–0.9)
ENA SS-B AB SER-ACNC: <0.2 AI (ref 0–0.9)
FLUAV AG NPH QL: NEGATIVE
FLUBV AG NPH QL IA: NEGATIVE
GLUCOSE UR STRIP-MCNC: NEGATIVE MG/DL
HGB UR QL STRIP.AUTO: NEGATIVE
HYALINE CASTS UR QL AUTO: ABNORMAL /LPF
KETONES UR QL STRIP: NEGATIVE
LEUKOCYTE ESTERASE UR QL STRIP.AUTO: NEGATIVE
NITRITE UR QL STRIP: NEGATIVE
PH UR STRIP.AUTO: <=5 [PH] (ref 5–8)
PROT UR QL STRIP: ABNORMAL
RBC # UR: ABNORMAL /HPF
REF LAB TEST METHOD: ABNORMAL
SP GR UR STRIP: >=1.03 (ref 1–1.03)
SQUAMOUS #/AREA URNS HPF: ABNORMAL /HPF
UROBILINOGEN UR QL STRIP: ABNORMAL
WBC UR QL AUTO: ABNORMAL /HPF

## 2020-06-19 PROCEDURE — 99283 EMERGENCY DEPT VISIT LOW MDM: CPT

## 2020-06-19 PROCEDURE — 87804 INFLUENZA ASSAY W/OPTIC: CPT | Performed by: EMERGENCY MEDICINE

## 2020-06-19 PROCEDURE — 81003 URINALYSIS AUTO W/O SCOPE: CPT | Performed by: EMERGENCY MEDICINE

## 2020-06-19 PROCEDURE — 87635 SARS-COV-2 COVID-19 AMP PRB: CPT | Performed by: EMERGENCY MEDICINE

## 2020-06-19 PROCEDURE — 81015 MICROSCOPIC EXAM OF URINE: CPT | Performed by: EMERGENCY MEDICINE

## 2020-06-19 RX ORDER — ONDANSETRON 4 MG/1
4 TABLET, ORALLY DISINTEGRATING ORAL EVERY 8 HOURS PRN
Qty: 12 TABLET | Refills: 0 | Status: SHIPPED | OUTPATIENT
Start: 2020-06-19 | End: 2021-04-06 | Stop reason: SDUPTHER

## 2020-06-20 VITALS
SYSTOLIC BLOOD PRESSURE: 121 MMHG | RESPIRATION RATE: 18 BRPM | TEMPERATURE: 97.7 F | BODY MASS INDEX: 35.68 KG/M2 | DIASTOLIC BLOOD PRESSURE: 81 MMHG | WEIGHT: 170 LBS | HEART RATE: 89 BPM | HEIGHT: 58 IN | OXYGEN SATURATION: 98 %

## 2020-06-20 LAB
DSDNA AB SER QL CLIF: NEGATIVE
SARS-COV-2 N GENE NPH QL NAA+PROBE: NOT DETECTED

## 2020-06-21 LAB
QUANTIFERON CRITERIA: NORMAL
QUANTIFERON MITOGEN VALUE: >10 IU/ML
QUANTIFERON NIL VALUE: 0.02 IU/ML
QUANTIFERON TB1 AG VALUE: 0.03 IU/ML
QUANTIFERON TB2 AG VALUE: 0.01 IU/ML
QUANTIFERON-TB GOLD PLUS: NEGATIVE

## 2020-06-21 NOTE — ED PROVIDER NOTES
Subjective   History of Present Illness       Chief Complaint: Nausea vomiting fatigue with myalgias  History of Present Illness: 34-year-old female presents with nausea vomiting fatigue.  Onset today.  Fibromyalgia and RA history.  No relief with over-the-counter medications.  No fever no obvious COVID-19 contacts no travel  Onset: Today  Duration: Persistent  Exacerbating / Alleviating factors: Attempted p.o. intake  Associated symptoms: None      Nurses Notes reviewed and agree, including vitals, allergies, social history and prior medical history.     REVIEW OF SYSTEMS: All systems reviewed and not pertinent unless noted.    Positive for: Nausea vomiting fatigue myalgias reported low back pain    Negative for: Fever GI bleeding urinary symptoms, weakness numbness cough shortness of breath sick contacts travel  Review of Systems    Past Medical History:   Diagnosis Date   • Bipolar affective disorder, manic (CMS/HCC)    • Fibromyalgia syndrome    • Hepatitis C    • RA (rheumatoid arthritis) (CMS/HCC)    • Seizures (CMS/HCC)    • Tachycardia    • White matter abnormality on MRI of brain        Allergies   Allergen Reactions   • Iodine Hives   • Latex Hives       Past Surgical History:   Procedure Laterality Date   • DILATATION AND CURETTAGE     • TONSILLECTOMY     • TUBAL ABDOMINAL LIGATION         History reviewed. No pertinent family history.    Social History     Socioeconomic History   • Marital status:      Spouse name: Not on file   • Number of children: Not on file   • Years of education: Not on file   • Highest education level: Not on file   Tobacco Use   • Smoking status: Current Every Day Smoker     Packs/day: 1.50     Types: Electronic Cigarette   • Smokeless tobacco: Never Used   Substance and Sexual Activity   • Alcohol use: No   • Drug use: No     Comment: been clean from heroin for 1 YEAR 3 MONTHS, relapsed today.    • Sexual activity: Defer           Objective   Physical Exam      GENERAL  APPEARANCE: Well developed, well nourished, 34-year-old white female, in no acute distress.  VITAL SIGNS: per nursing, reviewed and noted  SKIN: no rashes, ulcerations or petechiae.  Head: Normocephalic, atraumatic.   EYES: perrla. EOMI.  ENT:  TM clear, normal voice.  LUNGS:  normal breath sounds. No retractions.  No increased work of breathing  CARDIOVASCULAR:  regular rate and rhythm, no murmurs.  Good Peripheral pulses.  ABDOMEN: Soft, nontender, normal bowel sounds. No hernia. No ascites.  MUSCULOSKELETAL:  No tenderness. Full ROM. Strength and tone normal.  NEUROLOGIC: Alert, oriented x 3. No gross deficits.  GCS 15  NECK: Supple, symmetric. No tenderness, no masses. Full ROM  Back: full rom, no paraspinal spasm. No CVA tenderness.   PSYCH: appropriate affect,   : no bladder tenderness or distention, no CVA tenderness      Procedures     No attending provider procedures were performed      ED Course  ED Course as of Jun 21 1657 Fri Jun 19, 2020   2335 Influenza A Ag, EIA: Negative [PF]   2335 Influenza B Ag, EIA: Negative [PF]   2335 Blood, UA: Negative [PF]   2335 Leukocytes, UA: Negative [PF]   2335 Nitrite, UA: Negative [PF]   2335 Urobilinogen, UA: 1.0 E.U./dL [PF]   [place] Jun 21, 2020 1651 COVID19: Not Detected [PF]   1651 Influenza A Ag, EIA: Negative [PF]   1651 Influenza B Ag, EIA: Negative [PF]   1651 Glucose: Negative [PF]   1651 Ketones, UA: Negative [PF]   1651 Bilirubin, UA: Negative [PF]   1651 Blood, UA: Negative [PF]   1651 Protein, UA(!): Trace [PF]   1651 Leukocytes, UA: Negative [PF]   1651 Nitrite, UA: Negative [PF]   1651 Urobilinogen, UA: 1.0 E.U./dL [PF]   1651 RBC, UA: None Seen [PF]   1651 Bacteria, UA(!): Trace [PF]   1651 Squamous Epithelial Cells, UA(!): 3-6 [PF]   1651 Hyaline Casts, UA: None Seen [PF]   1651 Methodology:: Manual Light Microscopy [PF]      ED Course User Index  [PF] Brooks Grande, DO                                           MDM  34-year-old female with  negative flu negative UTI, given patient's history community COVID-19 spread, will test.  Discharged with Zofran deferred hematology labs given patient's normal blood pressure heart rate and temperature room air sats 98%.  Outpatient follow-up strict her precautions.  Final diagnoses:   Non-intractable vomiting with nausea, unspecified vomiting type   Myalgia            Brooks Grande, DO  06/21/20 7741

## 2020-06-29 ENCOUNTER — HOSPITAL ENCOUNTER (OUTPATIENT)
Dept: GENERAL RADIOLOGY | Facility: HOSPITAL | Age: 35
Discharge: HOME | End: 2020-06-29
Admitting: INTERNAL MEDICINE

## 2020-06-29 DIAGNOSIS — M05.79 RHEUMATOID ARTHRITIS INVOLVING MULTIPLE SITES WITH POSITIVE RHEUMATOID FACTOR (HCC): ICD-10-CM

## 2020-06-29 PROCEDURE — 73600 X-RAY EXAM OF ANKLE: CPT

## 2020-06-29 PROCEDURE — 73630 X-RAY EXAM OF FOOT: CPT

## 2020-06-29 PROCEDURE — 73130 X-RAY EXAM OF HAND: CPT

## 2020-11-18 ENCOUNTER — TRANSCRIBE ORDERS (OUTPATIENT)
Dept: MAMMOGRAPHY | Facility: HOSPITAL | Age: 35
End: 2020-11-18

## 2020-11-18 ENCOUNTER — HOSPITAL ENCOUNTER (OUTPATIENT)
Dept: ULTRASOUND IMAGING | Facility: HOSPITAL | Age: 35
Discharge: HOME | End: 2020-11-18

## 2020-11-18 ENCOUNTER — HOSPITAL ENCOUNTER (OUTPATIENT)
Dept: MAMMOGRAPHY | Facility: HOSPITAL | Age: 35
Discharge: HOME | End: 2020-11-18

## 2020-11-18 DIAGNOSIS — N64.52 BILATERAL NIPPLE DISCHARGE: ICD-10-CM

## 2020-11-18 DIAGNOSIS — N63.13 BREAST LUMP ON RIGHT SIDE AT 7 O'CLOCK POSITION: ICD-10-CM

## 2020-11-18 DIAGNOSIS — N64.52 BILATERAL NIPPLE DISCHARGE: Primary | ICD-10-CM

## 2020-11-18 PROCEDURE — 77066 DX MAMMO INCL CAD BI: CPT

## 2020-11-18 PROCEDURE — G0279 TOMOSYNTHESIS, MAMMO: HCPCS

## 2020-11-18 PROCEDURE — 76642 ULTRASOUND BREAST LIMITED: CPT

## 2020-12-14 ENCOUNTER — LAB REQUISITION (OUTPATIENT)
Dept: LAB | Facility: HOSPITAL | Age: 35
End: 2020-12-14

## 2020-12-14 DIAGNOSIS — Z11.59 ENCOUNTER FOR SCREENING FOR OTHER VIRAL DISEASES: ICD-10-CM

## 2020-12-14 LAB — SARS-COV-2 RNA PNL SPEC NAA+PROBE: NOT DETECTED

## 2020-12-14 PROCEDURE — 87635 SARS-COV-2 COVID-19 AMP PRB: CPT | Performed by: INTERNAL MEDICINE

## 2021-02-09 ENCOUNTER — OFFICE VISIT (OUTPATIENT)
Dept: GASTROENTEROLOGY | Facility: CLINIC | Age: 36
End: 2021-02-09

## 2021-02-09 VITALS
BODY MASS INDEX: 36.91 KG/M2 | HEART RATE: 88 BPM | DIASTOLIC BLOOD PRESSURE: 76 MMHG | RESPIRATION RATE: 16 BRPM | WEIGHT: 188 LBS | HEIGHT: 60 IN | TEMPERATURE: 96.8 F | SYSTOLIC BLOOD PRESSURE: 125 MMHG

## 2021-02-09 DIAGNOSIS — R76.8 HEPATITIS C ANTIBODY POSITIVE IN BLOOD: ICD-10-CM

## 2021-02-09 DIAGNOSIS — Z80.0 FAMILY HX OF COLON CANCER: ICD-10-CM

## 2021-02-09 DIAGNOSIS — K21.9 GASTROESOPHAGEAL REFLUX DISEASE WITHOUT ESOPHAGITIS: ICD-10-CM

## 2021-02-09 DIAGNOSIS — K58.1 IRRITABLE BOWEL SYNDROME WITH CONSTIPATION: ICD-10-CM

## 2021-02-09 DIAGNOSIS — E66.09 CLASS 2 OBESITY DUE TO EXCESS CALORIES WITHOUT SERIOUS COMORBIDITY WITH BODY MASS INDEX (BMI) OF 36.0 TO 36.9 IN ADULT: ICD-10-CM

## 2021-02-09 DIAGNOSIS — Z86.010 PERSONAL HISTORY OF COLONIC POLYPS: Primary | ICD-10-CM

## 2021-02-09 DIAGNOSIS — K59.03 DRUG-INDUCED CONSTIPATION: ICD-10-CM

## 2021-02-09 PROCEDURE — 99204 OFFICE O/P NEW MOD 45 MIN: CPT | Performed by: INTERNAL MEDICINE

## 2021-02-09 RX ORDER — NORETHINDRONE ACETATE AND ETHINYL ESTRADIOL 1MG-20(24)
1 KIT ORAL DAILY
COMMUNITY
End: 2021-03-31 | Stop reason: ALTCHOICE

## 2021-02-09 RX ORDER — BISACODYL 5 MG/1
20 TABLET, DELAYED RELEASE ORAL ONCE
Qty: 4 TABLET | Refills: 0 | Status: SHIPPED | OUTPATIENT
Start: 2021-02-09 | End: 2021-02-09

## 2021-02-09 RX ORDER — BACLOFEN 20 MG/1
20 TABLET ORAL NIGHTLY PRN
COMMUNITY

## 2021-02-09 RX ORDER — DULOXETIN HYDROCHLORIDE 60 MG/1
60 CAPSULE, DELAYED RELEASE ORAL DAILY
COMMUNITY
End: 2021-09-20

## 2021-02-09 RX ORDER — BUSPIRONE HYDROCHLORIDE 15 MG/1
15 TABLET ORAL AS NEEDED
COMMUNITY
End: 2021-10-18

## 2021-02-09 RX ORDER — BUPRENORPHINE HYDROCHLORIDE AND NALOXONE HYDROCHLORIDE DIHYDRATE 8; 2 MG/1; MG/1
1.25 TABLET SUBLINGUAL DAILY
COMMUNITY

## 2021-02-09 RX ORDER — SENNA PLUS 8.6 MG/1
2 TABLET ORAL NIGHTLY
Qty: 60 TABLET | Refills: 5 | Status: SHIPPED | OUTPATIENT
Start: 2021-02-09 | End: 2021-03-31

## 2021-02-09 RX ORDER — BUPROPION HYDROCHLORIDE 150 MG/1
150 TABLET ORAL DAILY
COMMUNITY
End: 2021-03-01 | Stop reason: HOSPADM

## 2021-02-09 RX ORDER — SODIUM CHLORIDE 9 MG/ML
70 INJECTION, SOLUTION INTRAVENOUS CONTINUOUS PRN
Status: CANCELLED | OUTPATIENT
Start: 2021-02-09

## 2021-02-09 NOTE — PROGRESS NOTES
New Patient Consult      Date: 2021   Patient Name: Alicia Atkinson  MRN: [id number]  : 1985     Referring Physician: Vicki Oconnor,*    Chief Complaint   Patient presents with   • Colon Cancer Screening   • Colon Polyps   • Constipation       History of Present Illness: Alicia Atkinson is a 35 y.o. female who is here today to establish care with Gastroenterology for colon cancer screening.  She has chronic constipation since she was a teenager. Her bowel movements are like once a week. Associated bloating and occasional cramps diffuse. She takes laxative bar every week. She was on subaxone since year or so. She did mialax every day , did not help. Tried dulcolax when she is severely constipated that causes significant cramps. She also has nausea, but no vomiting.   She will see red blood in wipes occasionally. No recent change in bowel habit, hematochezia or melena.  Weight is stable. Pt denies  odynophagia or dysphagia. There is history of acid reflux on ppi daily. There is no history of anemia. No prior history of EGD. Last colonoscopy was  and had two polyps removed. Family history of colon cancer ; Grand father and uncle had colon cancer. No history of any abdominal surgery. Denies alcohol abuse or cigarette smoking. She still vapes.   She had recent  blood work done that was positive for hep c antibody. She had prior history of substance abuse and she is clean.   She has RA and was on hunira. Humira on hold now as her hep c antibody came out postive.     Subjective      Past Medical History:   Past Medical History:   Diagnosis Date   • ADD (attention deficit disorder)    • Anxiety    • Bipolar affective disorder, manic (CMS/HCC)    • Depression    • Fibromyalgia syndrome    • Hepatitis C    • Lupus (systemic lupus erythematosus) (CMS/HCC)    • Migraine headache    • RA (rheumatoid arthritis) (CMS/HCC)    • Seizures (CMS/HCC)     withdrawing from opiates/benzos   • Tachycardia    •  Tattoos    • White matter abnormality on MRI of brain        Past Surgical History:   Past Surgical History:   Procedure Laterality Date   • CYST REMOVAL      back    • DILATATION AND CURETTAGE     • MOUTH SURGERY      teeth removal   • TONSILLECTOMY AND ADENOIDECTOMY     • TUBAL ABDOMINAL LIGATION         Family History:   Family History   Problem Relation Age of Onset   • Colon cancer Paternal Grandfather    • Cirrhosis Neg Hx    • Liver cancer Neg Hx        Social History:   Social History     Socioeconomic History   • Marital status:      Spouse name: Not on file   • Number of children: Not on file   • Years of education: Not on file   • Highest education level: Not on file   Tobacco Use   • Smoking status: Former Smoker     Packs/day: 1.50     Types: Electronic Cigarette, Cigarettes     Start date:      Quit date:      Years since quittin.1   • Smokeless tobacco: Never Used   Substance and Sexual Activity   • Alcohol use: Not Currently     Comment: social   • Drug use: No     Comment: been clean from heroin for 1 YEAR 3 MONTHS, relapsed today.    • Sexual activity: Defer         Current Outpatient Medications:   •  acetaminophen (TYLENOL 8 HOUR) 650 MG 8 hr tablet, Take 1 tablet by mouth Every 8 (Eight) Hours As Needed for Mild Pain ., Disp: 15 tablet, Rfl: 0  •  baclofen (LIORESAL) 20 MG tablet, Take 20 mg by mouth At Night As Needed for Muscle Spasms., Disp: , Rfl:   •  buprenorphine-naloxone (SUBOXONE) 8-2 MG per SL tablet, Place  under the tongue Daily., Disp: , Rfl:   •  buPROPion XL (WELLBUTRIN XL) 150 MG 24 hr tablet, Take 150 mg by mouth Daily., Disp: , Rfl:   •  busPIRone (BUSPAR) 15 MG tablet, Take 15 mg by mouth As Needed., Disp: , Rfl:   •  folic acid (FOLVITE) 1 MG tablet, Take  by mouth Daily., Disp: , Rfl: 3  •  furosemide (LASIX) 20 MG tablet, Take  by mouth Daily., Disp: , Rfl: 0  •  gabapentin (NEURONTIN) 800 MG tablet, TK 1 T PO TID, Disp: , Rfl: 3  •   Loratadine (CLARITIN PO), Take  by mouth As Needed., Disp: , Rfl:   •  methotrexate 2.5 MG tablet, Take  by mouth 1 (One) Time Per Week. Take 4 pills weekly, Disp: , Rfl: 3  •  norethindrone-ethinyl estradiol-ferrous fumarate (Blisovi 24 Fe) 1-20 MG-MCG(24) per tablet, Take  by mouth Daily., Disp: , Rfl:   •  omeprazole (priLOSEC) 40 MG capsule, TK 1 C PO QD AC, Disp: , Rfl: 0  •  ondansetron ODT (ZOFRAN-ODT) 4 MG disintegrating tablet, Place 1 tablet on the tongue Every 8 (Eight) Hours As Needed for Nausea., Disp: 12 tablet, Rfl: 0  •  predniSONE (DELTASONE) 10 MG tablet, Take 10 mg by mouth Daily., Disp: , Rfl:   •  Ubrogepant (UBRELVY PO), Take  by mouth As Needed. Migraines, Disp: , Rfl:   •  bisacodyl (DULCOLAX) 5 MG EC tablet, Take 4 tablets by mouth 1 (One) Time for 1 dose. Please see prep instructions from office., Disp: 4 tablet, Rfl: 0  •  DULoxetine (CYMBALTA) 60 MG capsule, Take 60 mg by mouth Daily., Disp: , Rfl:   •  polyethylene glycol (GoLYTELY) 236 g solution, Take 4,000 mL by mouth 1 (One) Time for 1 dose. Please see prep instructions from office., Disp: 4000 mL, Rfl: 0  •  senna (senna) 8.6 MG tablet, Take 2 tablets by mouth Every Night., Disp: 60 tablet, Rfl: 5    Allergies   Allergen Reactions   • Iodine Hives   • Latex Hives       Review of Systems:   Review of Systems   Constitutional: Positive for fatigue. Negative for appetite change, fever and unexpected weight loss.   HENT: Negative for trouble swallowing.    Respiratory: Negative for cough, shortness of breath and wheezing.    Cardiovascular: Positive for leg swelling. Negative for chest pain and palpitations.   Gastrointestinal: Positive for abdominal distention, anal bleeding, constipation and nausea. Negative for abdominal pain, blood in stool, diarrhea, rectal pain, vomiting, GERD and indigestion.   Genitourinary: Negative for dysuria, frequency and hematuria.   Musculoskeletal: Positive for arthralgias. Negative for back pain and  "joint swelling.   Skin: Negative for color change, rash and skin lesions.   Neurological: Negative for dizziness, syncope, speech difficulty, weakness, headache and memory problem.   Hematological: Negative for adenopathy. Does not bruise/bleed easily.   Psychiatric/Behavioral: Negative for agitation, behavioral problems, suicidal ideas and depressed mood.       The following portions of the patient's history were reviewed and updated as appropriate: allergies, current medications, past family history, past medical history, past social history, past surgical history and problem list.    Objective     Physical Exam:  Vital Signs:   Vitals:    02/09/21 1522   BP: 125/76   Pulse: 88   Resp: 16   Temp: 96.8 °F (36 °C)   Weight: 85.3 kg (188 lb)   Height: 152.4 cm (60\")       Physical Exam  Vitals signs and nursing note reviewed.   Constitutional:       Appearance: She is well-developed. She is obese.   HENT:      Head: Normocephalic and atraumatic.      Right Ear: External ear normal.      Left Ear: External ear normal.   Eyes:      Conjunctiva/sclera: Conjunctivae normal.   Neck:      Musculoskeletal: Normal range of motion and neck supple.      Thyroid: No thyromegaly.      Trachea: No tracheal deviation.   Cardiovascular:      Rate and Rhythm: Normal rate and regular rhythm.      Heart sounds: No murmur.   Pulmonary:      Effort: Pulmonary effort is normal. No respiratory distress.      Breath sounds: Normal breath sounds.   Abdominal:      General: Bowel sounds are normal. There is no distension.      Palpations: Abdomen is soft. There is no mass.      Tenderness: There is no abdominal tenderness.      Hernia: No hernia is present.   Musculoskeletal: Normal range of motion.   Skin:     General: Skin is warm and dry.   Neurological:      Mental Status: She is alert and oriented to person, place, and time.      Cranial Nerves: No cranial nerve deficit.      Sensory: No sensory deficit.   Psychiatric:         Mood and " Affect: Mood normal.         Behavior: Behavior normal.         Thought Content: Thought content normal.         Judgment: Judgment normal.         Results Review:   I have reviewed the patient's new clinical and imaging results and agree with the interpretation.     Lab Requisition on 12/14/2020   Component Date Value Ref Range Status   • COVID19 12/14/2020 Not Detected  Not Detected - Ref. Range Final      Us Breast Right Limited    Result Date: 11/18/2020  BI-RADS CATEGORY: 1 , NEGATIVE.      RECOMMENDATIONS: Clinical follow-up of any palpable abnormality.    NOTES: Mammography does not detect approximately 10-15% of breast cancers. Physical examination of the breasts by a physician and regular monthly breast self examinations are integral parts of breast cancer screening.  A normal mammogram does not exclude breast cancer if there is an abnormal finding on physical examination. When clinically indicated, a biopsy should not be postponed because of a normal mammogram report.  [place]. [address]  NOTE: If a biopsy is performed on this patient, a copy of the pathology report would be appreciated.  This report was finalized on 11/18/2020 3:11 PM by Artem Ronquillo M.D..    Mammo Diagnostic Digital Tomosynthesis Bilateral With Cad    Result Date: 11/18/2020  BI-RADS CATEGORY: 1 , NEGATIVE.      RECOMMENDATIONS: Clinical follow-up of any palpable abnormality.    NOTES: Mammography does not detect approximately 10-15% of breast cancers. Physical examination of the breasts by a physician and regular monthly breast self examinations are integral parts of breast cancer screening.  A normal mammogram does not exclude breast cancer if there is an abnormal finding on physical examination. When clinically indicated, a biopsy should not be postponed because of a normal mammogram report.  [place]. [address]  NOTE: If a  biopsy is performed on this patient, a copy of the pathology report would be appreciated.  This report was finalized on 11/18/2020 3:11 PM by Artem Ronquillo M.D..      Assessment / Plan      Addendum on 2/16/2021  Patient has a chronic hep C infection genotype 3, CTP class A, no cirrhosis, no prior history of any liver cancer or liver transplantation.   Hepatitis C FibroSure shows F0- No fibrosis.  HIV test is negative.  She is immune to hepatitis A and hepatitis B.   I had a brief discussion on treatment, adverse reactions to medications, follow-up during treatment and after treatment.  Also had a discussion on avoiding high risk behavior during treatment after treatment to prevent reinfection.   We will start her on Mavyret 8 weeks treatment      Assessment & Plan:  1. Personal history of colonic polyps  Patient states that she had a colonoscopy done in 2010 at [facility] and had at least 2 polyps removed and she was been told that she needs repeat colonoscopy in 3 years time.  She did not keep up the appointment.  He is due for colonoscopy now will schedule the same    The indications, technique, alternatives and potential risk and complications were discussed with the patient including but not limited to bleeding, bowel perforations, missing lesions and anesthetic complications. The patient understands and wishes to proceed with the procedure and has given their verbal consent. Written patient education information was given to the patient.   The patient will call if they have further questions before procedure.     - Case Request; Standing  - Implement Anesthesia Orders Day of Procedure; Standing  - Obtain Informed Consent; Standing  - Verify Bowel Prep Was Successful; Standing  - Oxygen Therapy- Nasal Cannula; 2 LPM; Titrate for SPO2: equal to or greater than, 90%; Standing  - POC Glucose Once; Standing  - Pregnancy, Urine -; Standing  - sodium chloride 0.9 % infusion  - Case Request  - Hepatitis A  Antibody, Total; Future  - Hepatitis B Core Antibody, Total; Future  - Hepatitis B Surface Antibody; Future  - Hepatitis B Surface Antigen; Future  - Hepatitis C Antibody; Future  - US Abdomen Complete; Future  - HCV FibroSURE; Future  - CBC Auto Differential; Future  - Comprehensive Metabolic Panel; Future  - Hepatitis C Genotype; Future  - Hepatitis C RNA, Quantitative, PCR (graph); Future  - HIV-1 & HIV-2 Antibodies; Future  - Protime-INR; Future    2. Family hx of colon cancer  Her grandfather and uncle had a colon cancer at the age of 60s    3. Drug-induced constipation  4. Irritable bowel syndrome with constipation  Patient appears to have irritable bowel syndrome at baseline and constipation got worse with the use of Suboxone since for the last couple of years..   He always had a constipation issue since he was a teenager along with abdominal bloating and abdominal cramps.  She currently uses MiraLAX daily and laxative about every week with a bowel movement once in a week.   Unfortunately patient's insurance does not cover any newer medications.  She tried Dulcolax which gave her significant cramping in the past.  We will start her on senna 2 tablets p.o. daily along with a bowel regimen and will see whether that helps her    5. Hepatitis C antibody positive in blood  She had a blood work done at rheumatology office that showed hep C antibody positive.  Patient does have history of substance abuse and she thinks that she contracted hep C more than 6 years ago.  We will get the rest of the blood work with antipation to treat her hep C infection    - Urine Drug Screen - Urine, Clean Catch    6. Gastroesophageal reflux disease without esophagitis  Well controlled with Prilosec 40 g p.o. daily dose  Will consider reducing the dose of 20 mg p.o. daily    7. Class 2 obesity due to excess calories without serious comorbidity with body mass index (BMI) of 36.0 to 36.9 in adult  Advised regular exercise half hour  every day at least 3 days in a week.  Reduced calorie intake  and lifestyle and dietary changes  have been discussed  We discussed on a losing at least 20 pounds over the next 6 months to 12 months time  Advised to avoid alcohol and smoking cigarette      Follow Up:   Return for Follow Up after procedure.    Rui Palacios MD  [facility]  2/9/2021  15:59 EST    Please note that portions of this note may have been completed with a voice recognition program.

## 2021-02-10 ENCOUNTER — LAB (OUTPATIENT)
Dept: LAB | Facility: HOSPITAL | Age: 36
End: 2021-02-10

## 2021-02-10 DIAGNOSIS — Z86.010 PERSONAL HISTORY OF COLONIC POLYPS: ICD-10-CM

## 2021-02-10 LAB
ALBUMIN SERPL-MCNC: 4.1 G/DL (ref 3.5–5.2)
ALBUMIN/GLOB SERPL: 1.6 G/DL
ALP SERPL-CCNC: 49 U/L (ref 39–117)
ALT SERPL W P-5'-P-CCNC: 16 U/L (ref 1–33)
ANION GAP SERPL CALCULATED.3IONS-SCNC: 6.9 MMOL/L (ref 5–15)
AST SERPL-CCNC: 13 U/L (ref 1–32)
BASOPHILS # BLD AUTO: 0.03 10*3/MM3 (ref 0–0.2)
BASOPHILS NFR BLD AUTO: 0.4 % (ref 0–1.5)
BILIRUB SERPL-MCNC: 0.4 MG/DL (ref 0–1.2)
BUN SERPL-MCNC: 12 MG/DL (ref 6–20)
BUN/CREAT SERPL: 13.6 (ref 7–25)
CALCIUM SPEC-SCNC: 9.1 MG/DL (ref 8.6–10.5)
CHLORIDE SERPL-SCNC: 103 MMOL/L (ref 98–107)
CO2 SERPL-SCNC: 29.1 MMOL/L (ref 22–29)
CREAT SERPL-MCNC: 0.88 MG/DL (ref 0.57–1)
DEPRECATED RDW RBC AUTO: 46.6 FL (ref 37–54)
EOSINOPHIL # BLD AUTO: 0.33 10*3/MM3 (ref 0–0.4)
EOSINOPHIL NFR BLD AUTO: 4.8 % (ref 0.3–6.2)
ERYTHROCYTE [DISTWIDTH] IN BLOOD BY AUTOMATED COUNT: 13.2 % (ref 12.3–15.4)
GFR SERPL CREATININE-BSD FRML MDRD: 72 ML/MIN/1.73
GLOBULIN UR ELPH-MCNC: 2.6 GM/DL
GLUCOSE SERPL-MCNC: 98 MG/DL (ref 65–99)
HBV SURFACE AB SER RIA-ACNC: REACTIVE
HBV SURFACE AG SERPL QL IA: NORMAL
HCT VFR BLD AUTO: 42 % (ref 34–46.6)
HCV AB SER DONR QL: REACTIVE
HGB BLD-MCNC: 14.3 G/DL (ref 12–15.9)
HIV1 P24 AG SER QL: NORMAL
HIV1+2 AB SER QL: NORMAL
IMM GRANULOCYTES # BLD AUTO: 0.02 10*3/MM3 (ref 0–0.05)
IMM GRANULOCYTES NFR BLD AUTO: 0.3 % (ref 0–0.5)
INR PPP: 0.87 (ref 0.9–1.1)
LYMPHOCYTES # BLD AUTO: 2.83 10*3/MM3 (ref 0.7–3.1)
LYMPHOCYTES NFR BLD AUTO: 41.1 % (ref 19.6–45.3)
MCH RBC QN AUTO: 33.5 PG (ref 26.6–33)
MCHC RBC AUTO-ENTMCNC: 34 G/DL (ref 31.5–35.7)
MCV RBC AUTO: 98.4 FL (ref 79–97)
MONOCYTES # BLD AUTO: 0.61 10*3/MM3 (ref 0.1–0.9)
MONOCYTES NFR BLD AUTO: 8.9 % (ref 5–12)
NEUTROPHILS NFR BLD AUTO: 3.07 10*3/MM3 (ref 1.7–7)
NEUTROPHILS NFR BLD AUTO: 44.5 % (ref 42.7–76)
NRBC BLD AUTO-RTO: 0 /100 WBC (ref 0–0.2)
PLATELET # BLD AUTO: 288 10*3/MM3 (ref 140–450)
PMV BLD AUTO: 9.8 FL (ref 6–12)
POTASSIUM SERPL-SCNC: 3.9 MMOL/L (ref 3.5–5.2)
PROT SERPL-MCNC: 6.7 G/DL (ref 6–8.5)
PROTHROMBIN TIME: 12.1 SECONDS (ref 12–15.1)
RBC # BLD AUTO: 4.27 10*6/MM3 (ref 3.77–5.28)
SODIUM SERPL-SCNC: 139 MMOL/L (ref 136–145)
WBC # BLD AUTO: 6.89 10*3/MM3 (ref 3.4–10.8)

## 2021-02-10 PROCEDURE — 87340 HEPATITIS B SURFACE AG IA: CPT

## 2021-02-10 PROCEDURE — 80053 COMPREHEN METABOLIC PANEL: CPT

## 2021-02-10 PROCEDURE — 86708 HEPATITIS A ANTIBODY: CPT

## 2021-02-10 PROCEDURE — 81596 NFCT DS CHRNC HCV 6 ASSAYS: CPT

## 2021-02-10 PROCEDURE — 36415 COLL VENOUS BLD VENIPUNCTURE: CPT

## 2021-02-10 PROCEDURE — 86706 HEP B SURFACE ANTIBODY: CPT

## 2021-02-10 PROCEDURE — 87899 AGENT NOS ASSAY W/OPTIC: CPT

## 2021-02-10 PROCEDURE — 86803 HEPATITIS C AB TEST: CPT

## 2021-02-10 PROCEDURE — G0432 EIA HIV-1/HIV-2 SCREEN: HCPCS

## 2021-02-10 PROCEDURE — 85025 COMPLETE CBC W/AUTO DIFF WBC: CPT

## 2021-02-10 PROCEDURE — 85610 PROTHROMBIN TIME: CPT

## 2021-02-10 PROCEDURE — 86704 HEP B CORE ANTIBODY TOTAL: CPT

## 2021-02-10 PROCEDURE — 87902 NFCT AGT GNTYP ALYS HEP C: CPT

## 2021-02-10 PROCEDURE — 87522 HEPATITIS C REVRS TRNSCRPJ: CPT

## 2021-02-11 LAB — HAV AB SER QL IA: POSITIVE

## 2021-02-12 ENCOUNTER — TELEPHONE (OUTPATIENT)
Dept: GASTROENTEROLOGY | Facility: CLINIC | Age: 36
End: 2021-02-12

## 2021-02-12 LAB
A2 MACROGLOB SERPL-MCNC: 245 MG/DL (ref 110–276)
ALT SERPL W P-5'-P-CCNC: 20 IU/L (ref 0–40)
APO A-I SERPL-MCNC: 226 MG/DL (ref 116–209)
BILIRUB SERPL-MCNC: 0.3 MG/DL (ref 0–1.2)
FIBROSIS SCORING:: ABNORMAL
FIBROSIS STAGE SERPL QL: ABNORMAL
GGT SERPL-CCNC: 12 IU/L (ref 0–60)
HAPTOGLOB SERPL-MCNC: 149 MG/DL (ref 33–278)
HBV CORE AB SERPL QL IA: NEGATIVE
HCV AB SER QL: ABNORMAL
HCV RNA SERPL NAA+PROBE-ACNC: NORMAL IU/ML
HCV RNA SERPL NAA+PROBE-LOG IU: 5.57 LOG10 IU/ML
LABORATORY COMMENT REPORT: ABNORMAL
LIVER FIBR SCORE SERPL CALC.FIBROSURE: 0.03 (ref 0–0.21)
NECROINFLAMM ACTIVITY SCORING:: ABNORMAL
NECROINFLAMMATORY ACT GRADE SERPL QL: ABNORMAL
NECROINFLAMMATORY ACT SCORE SERPL: 0.05 (ref 0–0.17)
SERVICE CMNT-IMP: ABNORMAL
TEST INFORMATION: NORMAL

## 2021-02-12 NOTE — TELEPHONE ENCOUNTER
Called patient.  Left message for patient to call.  She needs to  new instructions for colon prep.

## 2021-02-13 LAB
HCV GENTYP SERPL NAA+PROBE: 3
LABORATORY COMMENT REPORT: NORMAL

## 2021-02-17 ENCOUNTER — TELEPHONE (OUTPATIENT)
Dept: GASTROENTEROLOGY | Facility: CLINIC | Age: 36
End: 2021-02-17

## 2021-02-23 PROBLEM — Z86.0100 PERSONAL HISTORY OF COLONIC POLYPS: Status: ACTIVE | Noted: 2021-02-23

## 2021-02-23 PROBLEM — Z86.010 PERSONAL HISTORY OF COLONIC POLYPS: Status: ACTIVE | Noted: 2021-02-23

## 2021-02-25 ENCOUNTER — HOSPITAL ENCOUNTER (OUTPATIENT)
Dept: ULTRASOUND IMAGING | Facility: HOSPITAL | Age: 36
Discharge: HOME | End: 2021-02-25
Admitting: INTERNAL MEDICINE

## 2021-02-25 ENCOUNTER — APPOINTMENT (OUTPATIENT)
Dept: ULTRASOUND IMAGING | Facility: HOSPITAL | Age: 36
End: 2021-02-25

## 2021-02-25 DIAGNOSIS — Z86.010 PERSONAL HISTORY OF COLONIC POLYPS: ICD-10-CM

## 2021-02-25 DIAGNOSIS — Z01.818 PREOP TESTING: Primary | ICD-10-CM

## 2021-02-25 PROCEDURE — 76700 US EXAM ABDOM COMPLETE: CPT

## 2021-02-26 ENCOUNTER — LAB (OUTPATIENT)
Dept: LAB | Facility: HOSPITAL | Age: 36
End: 2021-02-26

## 2021-02-26 PROCEDURE — U0004 COV-19 TEST NON-CDC HGH THRU: HCPCS | Performed by: PHYSICIAN ASSISTANT

## 2021-02-26 PROCEDURE — C9803 HOPD COVID-19 SPEC COLLECT: HCPCS | Performed by: PHYSICIAN ASSISTANT

## 2021-02-27 LAB — SARS-COV-2 RNA RESP QL NAA+PROBE: NOT DETECTED

## 2021-03-01 ENCOUNTER — HOSPITAL ENCOUNTER (OUTPATIENT)
Facility: HOSPITAL | Age: 36
Setting detail: HOSPITAL OUTPATIENT SURGERY
Discharge: HOME | End: 2021-03-01
Attending: INTERNAL MEDICINE | Admitting: INTERNAL MEDICINE

## 2021-03-01 ENCOUNTER — ANESTHESIA (OUTPATIENT)
Dept: GASTROENTEROLOGY | Facility: HOSPITAL | Age: 36
End: 2021-03-01

## 2021-03-01 ENCOUNTER — ANESTHESIA EVENT (OUTPATIENT)
Dept: GASTROENTEROLOGY | Facility: HOSPITAL | Age: 36
End: 2021-03-01

## 2021-03-01 VITALS
DIASTOLIC BLOOD PRESSURE: 67 MMHG | OXYGEN SATURATION: 98 % | HEART RATE: 73 BPM | TEMPERATURE: 97 F | SYSTOLIC BLOOD PRESSURE: 108 MMHG | HEIGHT: 60 IN | BODY MASS INDEX: 36.91 KG/M2 | WEIGHT: 188 LBS | RESPIRATION RATE: 16 BRPM

## 2021-03-01 DIAGNOSIS — Z86.010 PERSONAL HISTORY OF COLONIC POLYPS: ICD-10-CM

## 2021-03-01 DIAGNOSIS — Z01.818 PREOP TESTING: ICD-10-CM

## 2021-03-01 LAB
B-HCG UR QL: NEGATIVE
INTERNAL NEGATIVE CONTROL: NEGATIVE
INTERNAL POSITIVE CONTROL: POSITIVE
Lab: NORMAL

## 2021-03-01 PROCEDURE — 81025 URINE PREGNANCY TEST: CPT | Performed by: INTERNAL MEDICINE

## 2021-03-01 PROCEDURE — 25010000002 MIDAZOLAM PER 1MG: Performed by: NURSE ANESTHETIST, CERTIFIED REGISTERED

## 2021-03-01 PROCEDURE — 25010000002 HALOPERIDOL LACTATE PER 5 MG: Performed by: NURSE ANESTHETIST, CERTIFIED REGISTERED

## 2021-03-01 PROCEDURE — 25010000002 FENTANYL CITRATE (PF) 100 MCG/2ML SOLUTION: Performed by: NURSE ANESTHETIST, CERTIFIED REGISTERED

## 2021-03-01 PROCEDURE — 25010000002 PROPOFOL 10 MG/ML EMULSION: Performed by: NURSE ANESTHETIST, CERTIFIED REGISTERED

## 2021-03-01 PROCEDURE — 45380 COLONOSCOPY AND BIOPSY: CPT | Performed by: INTERNAL MEDICINE

## 2021-03-01 RX ORDER — MIDAZOLAM HYDROCHLORIDE 2 MG/2ML
INJECTION, SOLUTION INTRAMUSCULAR; INTRAVENOUS AS NEEDED
Status: DISCONTINUED | OUTPATIENT
Start: 2021-03-01 | End: 2021-03-01 | Stop reason: SURG

## 2021-03-01 RX ORDER — KETAMINE HCL IN NACL, ISO-OSM 100MG/10ML
SYRINGE (ML) INJECTION AS NEEDED
Status: DISCONTINUED | OUTPATIENT
Start: 2021-03-01 | End: 2021-03-01 | Stop reason: SURG

## 2021-03-01 RX ORDER — PROPOFOL 10 MG/ML
VIAL (ML) INTRAVENOUS AS NEEDED
Status: DISCONTINUED | OUTPATIENT
Start: 2021-03-01 | End: 2021-03-01 | Stop reason: SURG

## 2021-03-01 RX ORDER — SODIUM CHLORIDE 0.9 % (FLUSH) 0.9 %
10 SYRINGE (ML) INJECTION AS NEEDED
Status: DISCONTINUED | OUTPATIENT
Start: 2021-03-01 | End: 2021-03-01 | Stop reason: HOSPADM

## 2021-03-01 RX ORDER — SIMETHICONE 20 MG/.3ML
EMULSION ORAL AS NEEDED
Status: DISCONTINUED | OUTPATIENT
Start: 2021-03-01 | End: 2021-03-01 | Stop reason: HOSPADM

## 2021-03-01 RX ORDER — SODIUM CHLORIDE 9 MG/ML
INJECTION, SOLUTION INTRAVENOUS CONTINUOUS PRN
Status: DISCONTINUED | OUTPATIENT
Start: 2021-03-01 | End: 2021-03-01 | Stop reason: SURG

## 2021-03-01 RX ORDER — FENTANYL CITRATE 50 UG/ML
INJECTION, SOLUTION INTRAMUSCULAR; INTRAVENOUS AS NEEDED
Status: DISCONTINUED | OUTPATIENT
Start: 2021-03-01 | End: 2021-03-01 | Stop reason: SURG

## 2021-03-01 RX ORDER — HALOPERIDOL 5 MG/ML
INJECTION INTRAMUSCULAR AS NEEDED
Status: DISCONTINUED | OUTPATIENT
Start: 2021-03-01 | End: 2021-03-01 | Stop reason: SURG

## 2021-03-01 RX ORDER — SODIUM CHLORIDE 9 MG/ML
70 INJECTION, SOLUTION INTRAVENOUS CONTINUOUS PRN
Status: DISCONTINUED | OUTPATIENT
Start: 2021-03-01 | End: 2021-03-01 | Stop reason: HOSPADM

## 2021-03-01 RX ADMIN — HALOPERIDOL LACTATE 0.5 MG: 5 INJECTION, SOLUTION INTRAMUSCULAR at 11:52

## 2021-03-01 RX ADMIN — PROPOFOL 30 MG: 10 INJECTION, EMULSION INTRAVENOUS at 12:08

## 2021-03-01 RX ADMIN — PROPOFOL 30 MG: 10 INJECTION, EMULSION INTRAVENOUS at 11:53

## 2021-03-01 RX ADMIN — PROPOFOL 30 MG: 10 INJECTION, EMULSION INTRAVENOUS at 11:59

## 2021-03-01 RX ADMIN — MIDAZOLAM HYDROCHLORIDE 2 MG: 1 INJECTION, SOLUTION INTRAMUSCULAR; INTRAVENOUS at 11:50

## 2021-03-01 RX ADMIN — PROPOFOL 30 MG: 10 INJECTION, EMULSION INTRAVENOUS at 11:52

## 2021-03-01 RX ADMIN — PROPOFOL 30 MG: 10 INJECTION, EMULSION INTRAVENOUS at 11:55

## 2021-03-01 RX ADMIN — PROPOFOL 30 MG: 10 INJECTION, EMULSION INTRAVENOUS at 11:54

## 2021-03-01 RX ADMIN — PROPOFOL 30 MG: 10 INJECTION, EMULSION INTRAVENOUS at 11:57

## 2021-03-01 RX ADMIN — FENTANYL CITRATE 50 MCG: 50 INJECTION, SOLUTION INTRAMUSCULAR; INTRAVENOUS at 11:50

## 2021-03-01 RX ADMIN — PROPOFOL 30 MG: 10 INJECTION, EMULSION INTRAVENOUS at 12:04

## 2021-03-01 RX ADMIN — SODIUM CHLORIDE 70 ML/HR: 9 INJECTION, SOLUTION INTRAVENOUS at 11:05

## 2021-03-01 RX ADMIN — PROPOFOL 30 MG: 10 INJECTION, EMULSION INTRAVENOUS at 12:01

## 2021-03-01 RX ADMIN — FENTANYL CITRATE 50 MCG: 50 INJECTION, SOLUTION INTRAMUSCULAR; INTRAVENOUS at 11:53

## 2021-03-01 RX ADMIN — Medication 25 MG: at 11:51

## 2021-03-01 RX ADMIN — Medication 25 MG: at 11:54

## 2021-03-01 RX ADMIN — SODIUM CHLORIDE: 9 INJECTION, SOLUTION INTRAVENOUS at 11:39

## 2021-03-01 NOTE — DISCHARGE INSTRUCTIONS
Rest today  No pushing,pulling,tugging,heavy lifting, or strenuous activity   No major decision making,driving,or drinking alcoholic beverages for 24 hours due to the sedation you received  Always use good hand hygiene/washing technique  No driving on pain medication.    To assist you in voiding:  Drink plenty of fluids  Listen to running water while attempting to void.    If you are unable to urinate and you have an uncomfortable urge to void or it has been   6 hours since you were discharged, return to the Emergency Room.    - Discharge patient to home (ambulatory).   - High fiber diet.   - Continue present medications.   - Await pathology results.   - Repeat colonoscopy in 7-10 years years based on path report.   - Return to GI office in 6 weeks.

## 2021-03-01 NOTE — ANESTHESIA PREPROCEDURE EVALUATION
Anesthesia Evaluation     Patient summary reviewed and Nursing notes reviewed   NPO Solid Status: > 8 hours  NPO Liquid Status: > 8 hours           Airway   Mallampati: II  TM distance: >3 FB  Neck ROM: full  Possible difficult intubation  Dental      Pulmonary    (+) a smoker Former, sleep apnea, decreased breath sounds,   Cardiovascular     (+) hypertension, dysrhythmias Tachycardia, PVD,       Neuro/Psych  (+) seizures, headaches, psychiatric history Anxiety, ADD and Bipolar,     GI/Hepatic/Renal/Endo    (+) obesity,  GERD,  hepatitis C, liver disease,     Musculoskeletal     (+) arthralgias, back pain, chronic pain, myalgias,   Abdominal   (+) obese,    Substance History   (+) drug use     OB/GYN          Other   arthritis, autoimmune disease lupus and rheumatoid arthritis,                      Anesthesia Plan    ASA 3     MAC   (Risks and benefits discussed including risk of aspiration, recall and dental damage. All patient questions answered.    Will continue with plan of care.)  intravenous induction     Anesthetic plan, all risks, benefits, and alternatives have been provided, discussed and informed consent has been obtained with: patient.

## 2021-03-01 NOTE — ANESTHESIA POSTPROCEDURE EVALUATION
Patient: Alicia Atkinson    Procedure Summary     Date: 03/01/21 Room / Location: [facility] ENDOSCOPY 1 / [facility] ENDOSCOPY    Anesthesia Start: 1148 Anesthesia Stop:     Procedure: COLONOSCOPY WITH BIOPSY AND POLYPECTOMY (N/A Anus) Diagnosis:       Personal history of colonic polyps      (Personal history of colonic polyps [Z86.010])    Surgeon: Rui Palacios MD Provider: Rashard Deal CRNA    Anesthesia Type: MAC ASA Status: 3          Anesthesia Type: MAC    Vitals  No vitals data found for the desired time range.          Post Anesthesia Care and Evaluation    Patient location during evaluation: PHASE II  Patient participation: complete - patient participated  Level of consciousness: awake  Pain score: 0  Pain management: adequate  Airway patency: patent  Anesthetic complications: No anesthetic complications  PONV Status: none  Cardiovascular status: acceptable  Respiratory status: acceptable and nasal cannula  Hydration status: acceptable    Comments: vsss resp spont, reflexes intact, responsive, report given to pacu nurse

## 2021-03-03 LAB
LAB AP CASE REPORT: NORMAL
PATH REPORT.FINAL DX SPEC: NORMAL

## 2021-03-03 RX ORDER — GLECAPREVIR AND PIBRENTASVIR 40; 100 MG/1; MG/1
3 TABLET, FILM COATED ORAL DAILY
Qty: 168 TABLET | Refills: 0 | Status: SHIPPED | OUTPATIENT
Start: 2021-03-03 | End: 2021-10-18

## 2021-03-04 ENCOUNTER — SPECIALTY PHARMACY (OUTPATIENT)
Dept: PHARMACY | Facility: HOSPITAL | Age: 36
End: 2021-03-04

## 2021-03-04 RX ORDER — BUPROPION HYDROCHLORIDE 150 MG/1
150 TABLET ORAL
COMMUNITY
End: 2021-09-20

## 2021-03-04 RX ORDER — MEDROXYPROGESTERONE ACETATE 150 MG/ML
150 INJECTION, SUSPENSION INTRAMUSCULAR
COMMUNITY
End: 2022-05-02

## 2021-03-04 RX ORDER — TYROSINE 500 MG
2 CAPSULE ORAL DAILY
COMMUNITY
End: 2021-10-18

## 2021-03-04 NOTE — PROGRESS NOTES
[facility]   Progress Note    Patient Name: Alicia Atkinson  : 1985  MRN: [id number]  Primary Care Physician: Yasmeen Bhatt APRN  Date of admission: (Not on file)      Medication Management Note     Alicia is a 35 year old female seen in the [facility] for Hepatitis C treatment.      Past Medical History:   Diagnosis Date   • ADD (attention deficit disorder)    • Anxiety    • Bipolar affective disorder, manic (CMS/HCC)    • Body piercing     both ears   • Borderline personality disorder (CMS/HCC)    • Depression    • Fibromyalgia syndrome    • Full dentures    • Hepatitis C    • Lupus (systemic lupus erythematosus) (CMS/HCC)    • Migraine headache    • Pancreatitis    • RA (rheumatoid arthritis) (CMS/HCC)    • Seizures (CMS/HCC)     withdrawing from opiates/benzos   • Tachycardia    • Tattoos    • White matter abnormality on MRI of brain      Social History     Socioeconomic History   • Marital status:      Spouse name: Not on file   • Number of children: Not on file   • Years of education: Not on file   • Highest education level: Not on file   Tobacco Use   • Smoking status: Former Smoker     Packs/day: 1.50     Types: Electronic Cigarette, Cigarettes     Start date:      Quit date: 2020     Years since quittin.1   • Smokeless tobacco: Never Used   Substance and Sexual Activity   • Alcohol use: Not Currently     Comment: social   • Drug use: Yes     Types: Heroin     Comment: 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     • Sexual activity: Defer     Iodine and Latex    Current Outpatient Medications:   •  acetaminophen (TYLENOL 8 HOUR) 650 MG 8 hr tablet, Take 1 tablet by mouth Every 8 (Eight) Hours As Needed for Mild Pain ., Disp: 15 tablet, Rfl: 0  •  baclofen (LIORESAL) 20 MG tablet, Take 20 mg by mouth At Night As Needed for Muscle Spasms., Disp: , Rfl:   •  buprenorphine-naloxone (SUBOXONE) 8-2 MG per SL tablet, Place 1.5 tablets under the tongue Daily., Disp: , Rfl:    •  buPROPion XL (WELLBUTRIN XL) 150 MG 24 hr tablet, Take 150 mg by mouth every night at bedtime., Disp: , Rfl:   •  busPIRone (BUSPAR) 15 MG tablet, Take 15 mg by mouth As Needed., Disp: , Rfl:   •  folic acid (FOLVITE) 1 MG tablet, Take 1 mg by mouth Daily., Disp: , Rfl: 3  •  furosemide (LASIX) 20 MG tablet, Take 20 mg by mouth Daily., Disp: , Rfl: 0  •  gabapentin (NEURONTIN) 800 MG tablet, Take 800 mg by mouth 3 (Three) Times a Day As Needed., Disp: , Rfl: 3  •  L-Tyrosine 500 MG capsule, Take 2 capsules by mouth Daily., Disp: , Rfl:   •  medroxyPROGESTERone (DEPO-PROVERA) 150 MG/ML injection, Inject 150 mg into the appropriate muscle as directed by prescriber Every 3 (Three) Months., Disp: , Rfl:   •  methotrexate 2.5 MG tablet, Take 12.5 mg by mouth 1 (One) Time Per Week. Take 4 pills weekly, Disp: , Rfl: 3  •  ondansetron ODT (ZOFRAN-ODT) 4 MG disintegrating tablet, Place 1 tablet on the tongue Every 8 (Eight) Hours As Needed for Nausea., Disp: 12 tablet, Rfl: 0  •  predniSONE (DELTASONE) 10 MG tablet, Take 10 mg by mouth Daily., Disp: , Rfl:   •  senna (senna) 8.6 MG tablet, Take 2 tablets by mouth Every Night., Disp: 60 tablet, Rfl: 5  •  Ubrogepant (UBRELVY PO), Take  by mouth As Needed. Migraines, Disp: , Rfl:   •  DULoxetine (CYMBALTA) 60 MG capsule, Take 60 mg by mouth Daily., Disp: , Rfl:   •  Glecaprevir-Pibrentasvir (Mavyret) 100-40 MG tablet, Take 3 tablets by mouth Daily., Disp: 168 tablet, Rfl: 0  •  Loratadine (CLARITIN PO), Take  by mouth As Needed., Disp: , Rfl:   •  NON FORMULARY, Take 1 tablet by mouth Daily. Metabolism boost (from Marika), Disp: , Rfl:   •  norethindrone-ethinyl estradiol-ferrous fumarate (Blisovi 24 Fe) 1-20 MG-MCG(24) per tablet, Take 1 tablet by mouth Daily., Disp: , Rfl:   •  omeprazole (priLOSEC) 40 MG capsule, Take 40 mg by mouth Daily., Disp: , Rfl: 0    Labs     WBC   Date Value Ref Range Status   02/10/2021 6.89 3.40 - 10.80 10*3/mm3 Final     RBC   Date Value Ref  Range Status   02/10/2021 4.27 3.77 - 5.28 10*6/mm3 Final     Hemoglobin   Date Value Ref Range Status   02/10/2021 14.3 12.0 - 15.9 g/dL Final     Hematocrit   Date Value Ref Range Status   02/10/2021 42.0 34.0 - 46.6 % Final     MCV   Date Value Ref Range Status   02/10/2021 98.4 (H) 79.0 - 97.0 fL Final     MCH   Date Value Ref Range Status   02/10/2021 33.5 (H) 26.6 - 33.0 pg Final     MCHC   Date Value Ref Range Status   02/10/2021 34.0 31.5 - 35.7 g/dL Final     RDW   Date Value Ref Range Status   02/10/2021 13.2 12.3 - 15.4 % Final     RDW-SD   Date Value Ref Range Status   02/10/2021 46.6 37.0 - 54.0 fl Final     MPV   Date Value Ref Range Status   02/10/2021 9.8 6.0 - 12.0 fL Final     Platelets   Date Value Ref Range Status   02/10/2021 288 140 - 450 10*3/mm3 Final     Neutrophil %   Date Value Ref Range Status   02/10/2021 44.5 42.7 - 76.0 % Final     Lymphocyte %   Date Value Ref Range Status   02/10/2021 41.1 19.6 - 45.3 % Final     Monocyte %   Date Value Ref Range Status   02/10/2021 8.9 5.0 - 12.0 % Final     Eosinophil %   Date Value Ref Range Status   02/10/2021 4.8 0.3 - 6.2 % Final     Basophil %   Date Value Ref Range Status   02/10/2021 0.4 0.0 - 1.5 % Final     Immature Grans %   Date Value Ref Range Status   02/10/2021 0.3 0.0 - 0.5 % Final     Neutrophils, Absolute   Date Value Ref Range Status   02/10/2021 3.07 1.70 - 7.00 10*3/mm3 Final     Lymphocytes, Absolute   Date Value Ref Range Status   02/10/2021 2.83 0.70 - 3.10 10*3/mm3 Final     Monocytes, Absolute   Date Value Ref Range Status   02/10/2021 0.61 0.10 - 0.90 10*3/mm3 Final     Eosinophils, Absolute   Date Value Ref Range Status   02/10/2021 0.33 0.00 - 0.40 10*3/mm3 Final     Basophils, Absolute   Date Value Ref Range Status   02/10/2021 0.03 0.00 - 0.20 10*3/mm3 Final     Immature Grans, Absolute   Date Value Ref Range Status   02/10/2021 0.02 0.00 - 0.05 10*3/mm3 Final     nRBC   Date Value Ref Range Status   02/10/2021 0.0 0.0  - 0.2 /100 WBC Final       Lab Results   Component Value Date    WBC 6.89 02/10/2021    HGB 14.3 02/10/2021    HCT 42.0 02/10/2021    MCV 98.4 (H) 02/10/2021     02/10/2021     Lab Results   Component Value Date    HAV Positive (A) 02/10/2021    HEPBCAB Negative 02/10/2021       @[id number]@    Drug Interactions Screening     A drug-drug interactions check was made. One drug interaction expected according to literature. Blisovi is not recommended during Mavyret therapy due to increase in ALTs.      Assessment & Plan     Patient has Hepatitis C, genotype 3 without cirrhosis (F0) and is treatment naïve.  Patient has been prescribed Mavyret for 8 weeks.  Will begin prior authorization, if applicable and provide medication counseling to patient once drug is approved and ready to dispense.      Pt will follow up with clinic 4 weeks after starting medication to assess virologic response and medication tolerability.     Madina Snyder [facility]  03/04/21  14:49 EST   Patient was counseled on new medication Mavyret (glecaprevir and pibrentasvir)     The patient was counseled on the following:     - Take 3 tablets at the same time each day, with food.   - This medication is used to treat hepatitis C infection.   - Frequently reported side effects of this drug include: headache, loss of energy, nausea and diarrhea.   - Talk to your doctor right away if you notice dark urine, fatigue, lack of appetite, nausea, abdominal pain, light-colored stools, vomiting or yellow skin.   - Go to the ED with signs of a significant reaction including wheezing; chest tightness; fever; itching; bad cough; blue skin color; seizures; or swelling of face, lips, tongue or     throat.   - Be sure to follow up with our clinic 4 weeks after starting the medication to ensure you are tolerating the medication well and that you are responding appropriately to the medication.   - Make sure to tell your doctor or pharmacist about all medications  you are taking, including herbal supplements and OTC products.    - Do not stop taking this medication without talking to your provider.   - It is very important that you do not miss a dose of this medication.  Use a pill planner, medication adherence jose or other tool to help you remember how to take your medication.    - If you do miss a dose and it is within 18 hours from the usual dosage time, administer dose as soon as possible, then administer dose at usual dosage time.  If more than 18 hours from the usual dosage time has passed, skip the missed dose and administer the next dose at the usual time.       Patient Specific Counseling Points:     - Females of childbearing potential should consider postponing pregnancy until therapy is complete to reduce the risk of HCV transmission.   - This medication should not be used in pregnant females and it is not known if the medication is present in breast milk.   - Patient has been changed to Depo-Provera instead of Blisovi. Her first injection is scheduled for tomorrow 3/5/21.  - We will schedule Ms. Atkinson to follow up in 4 weeks at the clinic.    Madina Snyder, MiguelD.

## 2021-03-05 ENCOUNTER — APPOINTMENT (OUTPATIENT)
Dept: GENERAL RADIOLOGY | Facility: HOSPITAL | Age: 36
End: 2021-03-05

## 2021-03-05 ENCOUNTER — HOSPITAL ENCOUNTER (EMERGENCY)
Facility: HOSPITAL | Age: 36
Discharge: HOME | End: 2021-03-05
Attending: EMERGENCY MEDICINE | Admitting: EMERGENCY MEDICINE

## 2021-03-05 VITALS
SYSTOLIC BLOOD PRESSURE: 129 MMHG | TEMPERATURE: 97.9 F | BODY MASS INDEX: 37.29 KG/M2 | WEIGHT: 185 LBS | HEART RATE: 94 BPM | HEIGHT: 59 IN | OXYGEN SATURATION: 98 % | RESPIRATION RATE: 16 BRPM | DIASTOLIC BLOOD PRESSURE: 89 MMHG

## 2021-03-05 DIAGNOSIS — S60.051A CONTUSION OF RIGHT LITTLE FINGER WITHOUT DAMAGE TO NAIL, INITIAL ENCOUNTER: Primary | ICD-10-CM

## 2021-03-05 PROCEDURE — 99282 EMERGENCY DEPT VISIT SF MDM: CPT

## 2021-03-05 PROCEDURE — 73130 X-RAY EXAM OF HAND: CPT

## 2021-03-05 RX ORDER — IBUPROFEN 600 MG/1
600 TABLET ORAL EVERY 6 HOURS PRN
Qty: 30 TABLET | Refills: 0 | OUTPATIENT
Start: 2021-03-05 | End: 2021-09-20

## 2021-03-05 NOTE — ED PROVIDER NOTES
Subjective   35-year-old female presents to the ED with chief complaint of right hand/right fifth finger injury.  The patient states that she accidentally slammed her right fifth finger in her car door earlier this morning after arriving to work.  She complains of pain and swelling and bruising to the entire fifth finger.  Range of motion of the PIP joint is limited secondary to swelling.  She denies other injuries or complaints at this time.          Review of Systems   Musculoskeletal: Positive for arthralgias.        Right 5th finger injury   All other systems reviewed and are negative.      Past Medical History:   Diagnosis Date   • ADD (attention deficit disorder)    • Anxiety    • Bipolar affective disorder, manic (CMS/HCC)    • Body piercing     both ears   • Borderline personality disorder (CMS/HCC)    • Depression    • Fibromyalgia syndrome    • Full dentures    • Hepatitis C    • Lupus (systemic lupus erythematosus) (CMS/HCC)    • Migraine headache    • Pancreatitis    • RA (rheumatoid arthritis) (CMS/HCC)    • Seizures (CMS/HCC)     withdrawing from opiates/benzos   • Tachycardia    • Tattoos    • White matter abnormality on MRI of brain        Allergies   Allergen Reactions   • Iodine Hives   • Latex Hives       Past Surgical History:   Procedure Laterality Date   • COLONOSCOPY N/A 3/1/2021    Procedure: COLONOSCOPY WITH BIOPSY AND POLYPECTOMY;  Surgeon: Rui Palacios MD;  Location: [facility] ENDOSCOPY;  Service: Gastroenterology;  Laterality: N/A;   • CYST REMOVAL  1991    back    • DILATATION AND CURETTAGE     • MOUTH SURGERY      teeth removal   • TONSILLECTOMY AND ADENOIDECTOMY  2004   • TUBAL ABDOMINAL LIGATION  2004       Family History   Problem Relation Age of Onset   • Colon cancer Paternal Grandfather    • Cirrhosis Neg Hx    • Liver cancer Neg Hx        Social History     Socioeconomic History   • Marital status:      Spouse name: Not on file   • Number of children: Not on file    • Years of education: Not on file   • Highest education level: Not on file   Tobacco Use   • Smoking status: Former Smoker     Packs/day: 1.50     Types: Electronic Cigarette, Cigarettes     Start date:      Quit date:      Years since quittin.1   • Smokeless tobacco: Never Used   Substance and Sexual Activity   • Alcohol use: Not Currently     Comment: social   • Drug use: Yes     Types: Heroin     Comment: 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     • Sexual activity: Defer           Objective   Physical Exam  Vitals signs and nursing note reviewed.   Constitutional:       General: She is not in acute distress.     Appearance: She is well-developed. She is not diaphoretic.   HENT:      Head: Normocephalic and atraumatic.      Nose: Nose normal.   Eyes:      Conjunctiva/sclera: Conjunctivae normal.   Cardiovascular:      Rate and Rhythm: Normal rate and regular rhythm.   Pulmonary:      Effort: Pulmonary effort is normal. No respiratory distress.      Breath sounds: Normal breath sounds.   Abdominal:      General: There is no distension.      Palpations: Abdomen is soft.      Tenderness: There is no abdominal tenderness. There is no guarding.   Musculoskeletal:         General: No deformity.      Comments: Ecchymosis and edema to entire right 5th digit   Neurological:      Mental Status: She is alert and oriented to person, place, and time.      Cranial Nerves: No cranial nerve deficit.         Procedures           ED Course                                           MDM  X-rays negative for acute process.  Appropriate for discharge to follow-up as needed.    Final diagnoses:   Contusion of right little finger without damage to nail, initial encounter            Mathew Steele,   [id number]

## 2021-03-12 ENCOUNTER — TELEPHONE (OUTPATIENT)
Dept: PHARMACY | Facility: HOSPITAL | Age: 36
End: 2021-03-12

## 2021-03-12 NOTE — TELEPHONE ENCOUNTER
I spoke with Mr. Atkinson over the phone regarding her new therapy with Mavyret She is starting her week 2 box today and has not missed any doses. She typically takes her medication either with lunch or dinner depending on when she has a meal. Upon questioning she reports no more than 4 hour difference each day. Ms. Atkinson is keeping track of her medication in a notebook. No side effect reported at this time.     She has an appointment with Nery on 3/30/21 and will need labs on that date. She can get her refill on that date as well.    Madina Snyder, MiguelD.

## 2021-03-26 ENCOUNTER — FOLLOW-UP (OUTPATIENT)
Dept: PHARMACY | Facility: HOSPITAL | Age: 36
End: 2021-03-26

## 2021-03-29 NOTE — PROGRESS NOTES
Patient presented to the pharmacy complaining of stomach pain and nausea. Ms. Atkinson reports frequent nausea and right-sided pain in her stomach. She is having regular bowel movements, but does take medication to aid constipation. She started Mavyret almost 4 weeks prior to presentation. Nausea and pain started a few days prior to this encounter. I suggested that she call Dr. Palacios's office before they close and continue taking Mavyret. I also reached out to the office to have them follow-up with her. I spoke with Betty, who took the reported history.    Madina Snyder, MiguelD.

## 2021-03-30 ENCOUNTER — TELEPHONE (OUTPATIENT)
Dept: GASTROENTEROLOGY | Facility: CLINIC | Age: 36
End: 2021-03-30

## 2021-03-30 NOTE — TELEPHONE ENCOUNTER
----- Message from Nery Youssef PA-C sent at 3/26/2021  3:34 PM EDT -----  This is not likely related to her Mavyret. She should continue taking it and keep scheduled appt with me next week to discuss complaints. Can go to ED or urgent care if pain is very severe.   ----- Message -----  From: Evelia Hutton MA  Sent: 3/26/2021   2:44 PM EDT  To: Nery Youssef PA-C    Patient having nausea, vomiting and pain Rt side of belly.  On mavyret.  She has told the [facility] pharmacy and they have reached out to us.  She was instructed to call us.

## 2021-03-31 ENCOUNTER — OFFICE VISIT (OUTPATIENT)
Dept: GASTROENTEROLOGY | Facility: CLINIC | Age: 36
End: 2021-03-31

## 2021-03-31 ENCOUNTER — LAB (OUTPATIENT)
Dept: LAB | Facility: HOSPITAL | Age: 36
End: 2021-03-31

## 2021-03-31 VITALS
DIASTOLIC BLOOD PRESSURE: 86 MMHG | HEART RATE: 111 BPM | TEMPERATURE: 96.4 F | BODY MASS INDEX: 37.29 KG/M2 | WEIGHT: 185 LBS | SYSTOLIC BLOOD PRESSURE: 129 MMHG | HEIGHT: 59 IN

## 2021-03-31 DIAGNOSIS — B18.2 CHRONIC HEPATITIS C WITHOUT HEPATIC COMA (HCC): ICD-10-CM

## 2021-03-31 DIAGNOSIS — K59.04 CHRONIC IDIOPATHIC CONSTIPATION: Primary | ICD-10-CM

## 2021-03-31 DIAGNOSIS — K63.5 HYPERPLASTIC POLYP OF SIGMOID COLON: ICD-10-CM

## 2021-03-31 LAB
ALBUMIN SERPL-MCNC: 4.5 G/DL (ref 3.5–5.2)
ALBUMIN/GLOB SERPL: 1.6 G/DL
ALP SERPL-CCNC: 70 U/L (ref 39–117)
ALT SERPL W P-5'-P-CCNC: 16 U/L (ref 1–33)
ANION GAP SERPL CALCULATED.3IONS-SCNC: 12.2 MMOL/L (ref 5–15)
AST SERPL-CCNC: 19 U/L (ref 1–32)
BILIRUB SERPL-MCNC: 0.7 MG/DL (ref 0–1.2)
BUN SERPL-MCNC: 11 MG/DL (ref 6–20)
BUN/CREAT SERPL: 12.5 (ref 7–25)
CALCIUM SPEC-SCNC: 9.4 MG/DL (ref 8.6–10.5)
CHLORIDE SERPL-SCNC: 101 MMOL/L (ref 98–107)
CO2 SERPL-SCNC: 21.8 MMOL/L (ref 22–29)
CREAT SERPL-MCNC: 0.88 MG/DL (ref 0.57–1)
GFR SERPL CREATININE-BSD FRML MDRD: 72 ML/MIN/1.73
GLOBULIN UR ELPH-MCNC: 2.9 GM/DL
GLUCOSE SERPL-MCNC: 104 MG/DL (ref 65–99)
POTASSIUM SERPL-SCNC: 3.7 MMOL/L (ref 3.5–5.2)
PROT SERPL-MCNC: 7.4 G/DL (ref 6–8.5)
SODIUM SERPL-SCNC: 135 MMOL/L (ref 136–145)

## 2021-03-31 PROCEDURE — 87522 HEPATITIS C REVRS TRNSCRPJ: CPT

## 2021-03-31 PROCEDURE — 99214 OFFICE O/P EST MOD 30 MIN: CPT | Performed by: PHYSICIAN ASSISTANT

## 2021-03-31 PROCEDURE — 36415 COLL VENOUS BLD VENIPUNCTURE: CPT

## 2021-03-31 PROCEDURE — 80053 COMPREHEN METABOLIC PANEL: CPT

## 2021-03-31 RX ORDER — MULTIPLE VITAMINS W/ MINERALS TAB 9MG-400MCG
1 TAB ORAL DAILY
COMMUNITY
End: 2023-03-24

## 2021-03-31 NOTE — PROGRESS NOTES
Follow Up Note     Date: 2021   Patient Name: Alicia Atkinson  MRN: [id number]  : 1985     Primary Care Provider: Yasmeen Bhatt APRN     Chief Complaint:    Chief Complaint   Patient presents with   • Follow-up   • Hepatitis C   • Constipation   • recent procedure     History of present illness:   3/31/2021  Alicia Atkinson is a 35 y.o. female who is here today for follow up regarding constipation, Hepatitis C and recent colonoscopy.    She has had constipation throughout her life. Currently she is only having BMs after taking Ex lax chocolate bar. She has tried senokot, miralax, stool softeners and Linzess (unsure of dosage) in the past without relief. Has history of needing to manually disimpact. She does take Suboxone daily as prescribed by her physician. She has been working on losing weight with diet modification, low carb diet. Has lost 3 lbs since her last visit approx 1 month ago. She had an episode recently of bloating which was more prominent on her left abdomen, pain and rectal bleeding. Also had colonoscopy recently and would like to discuss those results.     For treatment of hepatitis C infection, she has been taking mavyret 3 tabs PO once daily. She has not missed any doses of this medication. Has not noticed any side effects with it. She is on week 48 of treatment.      Interval History:  2021  She has chronic constipation since she was a teenager. Her bowel movements are like once a week. Associated bloating and occasional cramps diffuse. She takes laxative bar every week. She was on subaxone since year or so. She did mialax every day , did not help. Tried dulcolax when she is severely constipated that causes significant cramps. She also has nausea, but no vomiting.     She will see red blood in wipes occasionally. No recent change in bowel habit, hematochezia or melena.  Weight is stable. Pt denies  odynophagia or dysphagia. There is history of acid reflux on ppi daily. There  is no history of anemia. No prior history of EGD. Last colonoscopy was 2010 and had two polyps removed. Family history of colon cancer ; Grand father and uncle had colon cancer. No history of any abdominal surgery. Denies alcohol abuse or cigarette smoking. She still vapes.   She had recent  blood work done that was positive for hep c antibody. She had prior history of substance abuse and she is clean.   She has RA and was on hunira. Humira on hold now as her hep c antibody came out postive.     Subjective     Past Medical History:   Diagnosis Date   • ADD (attention deficit disorder)    • Anxiety    • Bipolar affective disorder, manic (CMS/HCC)    • Body piercing     both ears   • Borderline personality disorder (CMS/HCC)    • Depression    • Fibromyalgia syndrome    • Full dentures    • Hepatitis C    • Lupus (systemic lupus erythematosus) (CMS/HCC)    • Migraine headache    • Pancreatitis    • RA (rheumatoid arthritis) (CMS/HCC)    • Seizures (CMS/HCC)     withdrawing from opiates/benzos   • Tachycardia    • Tattoos    • White matter abnormality on MRI of brain      Past Surgical History:   Procedure Laterality Date   • COLONOSCOPY N/A 3/1/2021    Procedure: COLONOSCOPY WITH BIOPSY AND POLYPECTOMY;  Surgeon: Rui Palacios MD;  Location: [facility];  Service: Gastroenterology;  Laterality: N/A;   • CYST REMOVAL  1991    back    • DILATATION AND CURETTAGE     • MOUTH SURGERY      teeth removal   • TONSILLECTOMY AND ADENOIDECTOMY  2004   • TUBAL ABDOMINAL LIGATION  2004     Family History   Problem Relation Age of Onset   • Colon cancer Paternal Grandfather    • Cirrhosis Neg Hx    • Liver cancer Neg Hx      Social History     Socioeconomic History   • Marital status:      Spouse name: Not on file   • Number of children: Not on file   • Years of education: Not on file   • Highest education level: Not on file   Tobacco Use   • Smoking status: Former Smoker     Packs/day: 1.50     Types:  Electronic Cigarette, Cigarettes     Start date:      Quit date:      Years since quittin.2   • Smokeless tobacco: Never Used   Vaping Use   • Vaping Use: Every day   • Start date: 2020   • Substances: Nicotine, Flavoring   • Devices: Disposable, Refillable tank   Substance and Sexual Activity   • Alcohol use: Yes     Comment: social   • Drug use: Yes     Types: Heroin     Comment: 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     • Sexual activity: Defer       Current Outpatient Medications:   •  acetaminophen (TYLENOL 8 HOUR) 650 MG 8 hr tablet, Take 1 tablet by mouth Every 8 (Eight) Hours As Needed for Mild Pain ., Disp: 15 tablet, Rfl: 0  •  baclofen (LIORESAL) 20 MG tablet, Take 20 mg by mouth At Night As Needed for Muscle Spasms., Disp: , Rfl:   •  buprenorphine-naloxone (SUBOXONE) 8-2 MG per SL tablet, Place 1 tablet under the tongue Daily., Disp: , Rfl:   •  buPROPion XL (WELLBUTRIN XL) 150 MG 24 hr tablet, Take 150 mg by mouth every night at bedtime., Disp: , Rfl:   •  busPIRone (BUSPAR) 15 MG tablet, Take 15 mg by mouth As Needed., Disp: , Rfl:   •  DULoxetine (CYMBALTA) 60 MG capsule, Take 60 mg by mouth Daily., Disp: , Rfl:   •  folic acid (FOLVITE) 1 MG tablet, Take 1 mg by mouth Daily., Disp: , Rfl: 3  •  furosemide (LASIX) 20 MG tablet, Take 20 mg by mouth Daily., Disp: , Rfl: 0  •  gabapentin (NEURONTIN) 800 MG tablet, Take 800 mg by mouth 3 (Three) Times a Day As Needed., Disp: , Rfl: 3  •  Glecaprevir-Pibrentasvir (Mavyret) 100-40 MG tablet, Take 3 tablets by mouth Daily., Disp: 168 tablet, Rfl: 0  •  ibuprofen (ADVIL,MOTRIN) 600 MG tablet, Take 1 tablet by mouth Every 6 (Six) Hours As Needed for Mild Pain ., Disp: 30 tablet, Rfl: 0  •  L-Tyrosine 500 MG capsule, Take 2 capsules by mouth Daily., Disp: , Rfl:   •  Loratadine (CLARITIN PO), Take  by mouth As Needed., Disp: , Rfl:   •  medroxyPROGESTERone (DEPO-PROVERA) 150 MG/ML injection, Inject 150 mg into the appropriate muscle as directed  by prescriber Every 3 (Three) Months., Disp  •  methotrexate 2.5 MG tablet, Take 12.5 mg by mouth 1 (One) Time Per Week. Take 4 pills weekly, Disp: , Rfl: 3  •  multivitamin with minerals (MULTIVITAMIN ADULTS PO), Take 1 tablet by mouth Daily. On A Ketone/Vitamin drink regimen, Disp: , Rfl:   •  omeprazole (priLOSEC) 40 MG capsule, Take 40 mg by mouth Daily., Disp: , Rfl: 0  •  ondansetron ODT (ZOFRAN-ODT) 4 MG disintegrating tablet, Place 1 tablet on the tongue Every 8 (Eight) Hours As Needed for Nausea., Disp: 12 tablet, Rfl: 0  •  predniSONE (DELTASONE) 10 MG tablet, Take 10 mg by mouth Daily., Disp: , Rfl:   •  Ubrogepant (UBRELVY PO), Take  by mouth As Needed. Migraines, Disp: , Rfl:     Allergies   Allergen Reactions   • Iodine Hives   • Latex Hives       The following portions of the patient's history were reviewed and updated as appropriate: allergies, current medications, past family history, past medical history, past social history, past surgical history and problem list.  Objective     Physical Exam  Vitals reviewed.   Constitutional:       General: She is not in acute distress.     Appearance: Normal appearance. She is well-developed. She is not ill-appearing or diaphoretic.   HENT:      Head: Normocephalic and atraumatic.      Right Ear: External ear normal.      Left Ear: External ear normal.      Nose: Nose normal.      Mouth/Throat:      Comments: Wearing a mask  Eyes:      General: No scleral icterus.        Right eye: No discharge.         Left eye: No discharge.      Conjunctiva/sclera: Conjunctivae normal.   Neck:      Vascular: No JVD.   Cardiovascular:      Rate and Rhythm: Normal rate and regular rhythm.      Heart sounds: Normal heart sounds. No murmur heard.   No friction rub. No gallop.    Pulmonary:      Effort: Pulmonary effort is normal. No respiratory distress.      Breath sounds: Normal breath sounds. No wheezing or rales.   Chest:      Chest wall: No tenderness.   Abdominal:       "General: Bowel sounds are normal. There is no distension.      Palpations: Abdomen is soft. There is no mass.      Tenderness: There is no abdominal tenderness. There is no guarding.   Musculoskeletal:         General: No deformity. Normal range of motion.      Cervical back: Normal range of motion.   Skin:     General: Skin is warm and dry.      Findings: No erythema or rash.   Neurological:      Mental Status: She is alert and oriented to person, place, and time.      Coordination: Coordination normal.   Psychiatric:         Behavior: Behavior normal.         Thought Content: Thought content normal.         Judgment: Judgment normal.      Comments: Mild anxious affect       Vitals:    03/31/21 0931   BP: 129/86   Pulse: 111   Temp: 96.4 °F (35.8 °C)   Weight: 83.9 kg (185 lb)   Height: 149.9 cm (59\")       Results Review:   I reviewed the patient's new clinical results.    Admission on 03/01/2021, Discharged on 03/01/2021   Component Date Value Ref Range Status   • SARS-CoV-2 CRICKET 02/26/2021 Not Detected  Not Detected Final   • HCG, Urine, QL 03/01/2021 Negative  Negative Final   • Lot Number 03/01/2021 [id number]   Final   • Internal Positive Control 03/01/2021 Positive   Final   • Internal Negative Control 03/01/2021 Negative   Final   • Case Report 03/01/2021    Final                    Value:Surgical Pathology Report                         Case: [id number]                                  Authorizing Provider:  Rui Palacios MD  Collected:           03/01/2021 11:58 AM          Ordering Location:     [facility]    Received:            03/01/2021 01:58 PM                                 [facility]                                                                    Pathologist:           Neeraj Meyers MD                                                       Specimens:   1) - Small Intestine, Ileum, TI BIOPSY CHANGE IN BOWEL HABITS                                       2) - Large Intestine, " Left / Descending Colon, random colon biopsies for change in                  bowel habits                                                                                        3) - Large Intestine, Sigmoid Colon, rectal sigmoid x4                                    • Final Diagnosis 2021    Final                    Value:This result contains rich text formatting which cannot be displayed here.   Lab on 02/10/2021   Component Date Value Ref Range Status   • Fibrosis Score 02/10/2021 0.03  0.00 - 0.21 Final   • Fibrosis Stage 02/10/2021 Comment   Final                       F0 - No fibrosis   • Necroinflammat Activity Score 02/10/2021 0.05  0.00 - 0.17 Final   • Necroinflammat Activity Grade 02/10/2021 A0-No activity   Final   • Alpha 2-Macroglobulins, Qn 02/10/2021 245  110 - 276 mg/dL Final   • Haptoglobin 02/10/2021 149  33 - 278 mg/dL Final   • Apolipoprotein A-1 02/10/2021 226* 116 - 209 mg/dL Final   • Total Bilirubin 02/10/2021 0.3  0.0 - 1.2 mg/dL Final   • GGT 02/10/2021 12  0 - 60 IU/L Final   • ALT (SGPT) 02/10/2021 20  0 - 40 IU/L Final   • HCV Qual Interp 02/10/2021 Comment   Final    Quantitative results of 6 biochemical tests are analyzed using  a computational algorithm to provide a quantitative surrogate  marker (0.0-1.0) for liver fibrosis (METAVIR F0-F4) and for  necroinflammatory activity (METAVIR A0-A3).   • Fibrosis Scorin/10/2021 Comment   Final          <0.21 = Stage F0 - No fibrosis  0.21 - 0.27 = Stage F0 - F1  0.27 - 0.31 = Stage F1 - Portal fibrosis  0.31 - 0.48 = Stage F1 - F2  0.48 - 0.58 = Stage F2 - Bridging fibrosis with few septa  0.58 - 0.72 = Stage F3 - Bridging fibrosis with many septa  0.72 - 0.74 = Stage F3 - F4        >0.74 = Stage F4 - Cirrhosis   • Necroinflamm Activity Scorin/10/2021 Comment   Final          <0.17 = Grade A0 - No Activity  0.17 - 0.29 = Grade A0 - A1  0.29 - 0.36 = Grade A1 - Minimal activity  0.36 - 0.52 = Grade A1 - A2  0.52 - 0.60 = Grade  A2 - Moderate activity  0.60 - 0.62 = Grade A2 - A3        >0.62 = Grade A3 - Severe activity   • Limitations: 02/10/2021 Comment   Final    The negative predictive value of a Fibrotest score <0.31 (absence of  clinically significant fibrosis) was 85% when compared to liver biopsy  in 1,270 HCV infected patients with a 38% prevalence of significant  liver fibrosis (F2, 3 or 4). The positive predictive value of a Fibro-  test score >0.48 (F2, 3, 4) was 61% in that same patient cohort. HCV  FibroSURE is not recommended in patients with Gilbert Disease, acute  hemolysis (e.g. HCV ribavirin therapy mediated hemolysis) acute hepa-  titis of the liver, extra-hepatic cholestasis, transplant patients,  and/or renal insufficiency patients.  Any of these clinical situations  may lead to inaccurate quantitative predictions of fibrosis and  necroinflammatory activity in the liver.   • Comment 02/10/2021 Comment   Final    This test was developed and its performance characteristics determined  by [facility].  It has not been cleared or approved by the Food and Drug  Administration.  The FDA has determined that such clearance or  approval is not necessary.  For questions regarding this report please contact customer service  at [phone].   • Hep A Total Ab 02/10/2021 Positive* Negative Final   • Hep B Core Total Ab 02/10/2021 Negative  Negative Final   • Hep B S Ab 02/10/2021 Reactive* Non-Reactive Final   • Hepatitis B Surface Ag 02/10/2021 Non-Reactive  Non-Reactive Final   • Hepatitis C Ab 02/10/2021 Reactive* Non-Reactive Final   • WBC 02/10/2021 6.89  3.40 - 10.80 10*3/mm3 Final   • RBC 02/10/2021 4.27  3.77 - 5.28 10*6/mm3 Final   • Hemoglobin 02/10/2021 14.3  12.0 - 15.9 g/dL Final   • Hematocrit 02/10/2021 42.0  34.0 - 46.6 % Final   • MCV 02/10/2021 98.4* 79.0 - 97.0 fL Final   • MCH 02/10/2021 33.5* 26.6 - 33.0 pg Final   • MCHC 02/10/2021 34.0  31.5 - 35.7 g/dL Final   • RDW 02/10/2021 13.2  12.3 - 15.4 % Final   •  RDW-SD 02/10/2021 46.6  37.0 - 54.0 fl Final   • MPV 02/10/2021 9.8  6.0 - 12.0 fL Final   • Platelets 02/10/2021 288  140 - 450 10*3/mm3 Final   • Neutrophil % 02/10/2021 44.5  42.7 - 76.0 % Final   • Lymphocyte % 02/10/2021 41.1  19.6 - 45.3 % Final   • Monocyte % 02/10/2021 8.9  5.0 - 12.0 % Final   • Eosinophil % 02/10/2021 4.8  0.3 - 6.2 % Final   • Basophil % 02/10/2021 0.4  0.0 - 1.5 % Final   • Immature Grans % 02/10/2021 0.3  0.0 - 0.5 % Final   • Neutrophils, Absolute 02/10/2021 3.07  1.70 - 7.00 10*3/mm3 Final   • Lymphocytes, Absolute 02/10/2021 2.83  0.70 - 3.10 10*3/mm3 Final   • Monocytes, Absolute 02/10/2021 0.61  0.10 - 0.90 10*3/mm3 Final   • Eosinophils, Absolute 02/10/2021 0.33  0.00 - 0.40 10*3/mm3 Final   • Basophils, Absolute 02/10/2021 0.03  0.00 - 0.20 10*3/mm3 Final   • Immature Grans, Absolute 02/10/2021 0.02  0.00 - 0.05 10*3/mm3 Final   • nRBC 02/10/2021 0.0  0.0 - 0.2 /100 WBC Final   • Glucose 02/10/2021 98  65 - 99 mg/dL Final   • BUN 02/10/2021 12  6 - 20 mg/dL Final   • Creatinine 02/10/2021 0.88  0.57 - 1.00 mg/dL Final   • Sodium 02/10/2021 139  136 - 145 mmol/L Final   • Potassium 02/10/2021 3.9  3.5 - 5.2 mmol/L Final   • Chloride 02/10/2021 103  98 - 107 mmol/L Final   • CO2 02/10/2021 29.1* 22.0 - 29.0 mmol/L Final   • Calcium 02/10/2021 9.1  8.6 - 10.5 mg/dL Final   • Total Protein 02/10/2021 6.7  6.0 - 8.5 g/dL Final   • Albumin 02/10/2021 4.10  3.50 - 5.20 g/dL Final   • ALT (SGPT) 02/10/2021 16  1 - 33 U/L Final   • AST (SGOT) 02/10/2021 13  1 - 32 U/L Final   • Alkaline Phosphatase 02/10/2021 49  39 - 117 U/L Final   • Total Bilirubin 02/10/2021 0.4  0.0 - 1.2 mg/dL Final   • eGFR Non  Amer 02/10/2021 72  >60 mL/min/1.73 Final   • Globulin 02/10/2021 2.6  gm/dL Final   • A/G Ratio 02/10/2021 1.6  g/dL Final   • BUN/Creatinine Ratio 02/10/2021 13.6  7.0 - 25.0 Final   • Anion Gap 02/10/2021 6.9  5.0 - 15.0 mmol/L Final   • Please note 02/10/2021 Comment   Final     This test was developed and its performance characteristics determined  by [facility].  It has not been cleared or approved by the U.S. Food and  Drug Administration.  The FDA has determined that such clearance or approval is not  necessary. This test is used for clinical purposes.  It should not be  regarded as investigational or for research.   • Hepatitis C Genotype 02/10/2021 3   Final   • Hepatitis C Quantitation 02/10/2021 [id number]  IU/mL Final   • HCV log10 02/10/2021 5.574  log10 IU/mL Final   • Test Information 02/10/2021 Comment   Final    The quantitative range of this assay is 15 IU/mL to 100 million IU/mL.   • Protime 02/10/2021 12.1  12.0 - 15.1 Seconds Final   • INR 02/10/2021 0.87* 0.90 - 1.10 Final   • HIV-1/ HIV-2 Ab 02/10/2021 Non-Reactive  Non-Reactive Final   • HIV-1 P24 Ag Screen 02/10/2021 Non-Reactive  Non-Reactive Final      XR Hand 3+ View Right    Result Date: 3/5/2021  Unremarkable exam.    This report was finalized on 3/5/2021 9:21 AM by Neeraj Azar MD.    US Abdomen Complete    Result Date: 2/25/2021  Cholecystectomy. Otherwise, unremarkable ultrasound of the abdomen.      Images were reviewed, interpreted, and dictated by Dr. Artem Ronquillo M.D. Transcribed by Alison Rivers PA-C.  This report was finalized on 2/25/2021 12:45 PM by Artem Ronquillo M.D..     Colonoscopy was completed by Dr. Palacios on 3/1/2021.  Findings were:  - Preparation of the colon was fair.  - Four 2 to 3 mm polyps at the recto-sigmoid colon, removed with a cold biopsy forceps. Resected and retrieved. Clinically hyperplastic  - Stool in the entire examined colon.  - Biopsies were taken with a cold forceps from the right colon, left colon and transverse colon for evaluation of microscopic colitis.  - No endoscopic signs of colitis or ileitis  Pathology pathology showed ileal mucosa with no significant histopathologic abnormality of the terminal ileal biopsy, random colon biopsies were normal, rectosigmoid  polyps x4 were hyperplastic.    Assessment / Plan      1. Chronic idiopathic constipation  3/31/2021  She has chronic constipation, likely made worse by her suboxone therapy. She has tried and failed several constipation therapy regimens including miralax, senokot, ex lax, stool softeners and previous Linzess therapy. Her insurance does not appear to cover any OIC therapies. Start taking Trulance 3 mg once daily with or without food for treatment of constipation. Samples were given today. Trulance is a secretory stimulant (guanylate cyclase agonist) used to treat constipation by acting like the natural uroguanylin which helps to provide the appropriate amount of fluid in the intestines. She was instructed to increase dietary fiber intake to 25-45g daily and a list of fiber foods was given. She has agreed to try to increase daily water intake and daily exercise as well.   - Plecanatide 3 MG tablet; Take 1 tablet by mouth Daily.  Dispense: 30 tablet; Refill: 5    2/9/2021  Patient appears to have irritable bowel syndrome at baseline and constipation got worse with the use of Suboxone since for the last couple of years..   He always had a constipation issue since he was a teenager along with abdominal bloating and abdominal cramps.  She currently uses MiraLAX daily and laxative about every week with a bowel movement once in a week.   Unfortunately patient's insurance does not cover any newer medications.  She tried Dulcolax which gave her significant cramping in the past.  We will start her on senna 2 tablets p.o. daily along with a bowel regimen and will see whether that helps her    2. Chronic hepatitis C without hepatic coma, genotype 3, fibrosis F0  3/31/2021  She has chronic hepatitis C infection, treatment naive, without cirrhosis. She has already started treatment with Mavyret 3 tabs PO once daily, has been taking this mediction for approx 3.5 weeks. She will continue this exactly as directed without missing any  doses. Hep C viral load lab (HCV RNA quant) and CMP will be checked within the next several days which will be 4 weeks after start of therapy. She will have labs again at end of therapy and 3 months s/p therapy to determine response to treatment and cure. She will continue to abstain from alcohol use at this time and agrees not to use illegal drugs. She understands to avoid any high risk behavior to prevent any reinfection during treatment and after treatment.  - Comprehensive Metabolic Panel; Future  - Hepatitis C RNA, Quantitative, PCR (graph); Future    2/9/2021  Patient has a chronic hep C infection genotype 3, CTP class A, no cirrhosis, no prior history of any liver cancer or liver transplantation.   Hepatitis C FibroSure shows F0- No fibrosis.  HIV test is negative.  She is immune to hepatitis A and hepatitis B.   I had a brief discussion on treatment, adverse reactions to medications, follow-up during treatment and after treatment.  Also had a discussion on avoiding high risk behavior during treatment after treatment to prevent reinfection.   We will start her on Mavyret 8 weeks treatment  She had a blood work done at rheumatology office that showed hep C antibody positive.  Patient does have history of substance abuse and she thinks that she contracted hep C more than 6 years ago.  We will get the rest of the blood work with antipation to treat her hep C infection    3. Hyperplastic polyp of sigmoid colon  3/31/2021  She had 4 hyperplastic rectosigmoid polyps removed on recent colonoscopy. These are not considered to be pre-cancerous colon polyps. She will need to have repeat [facility] colonoscopy at age 43. We will place her on the recall list to be called to schedule an appointment closer to that date. She was instructed to call the office if no reminder call is made within the proper number of years. Also, the indications for a repeat colonoscopy sooner than the recall date were discussed; rectal bleeding,  melena, change in bowel habits or significant abdominal pain.      2/9/2021  Patient states that she had a colonoscopy done in 2010 at [facility] and had at least 2 polyps removed and she was been told that she needs repeat colonoscopy in 3 years time.  She did not keep up the appointment.  He is due for colonoscopy now will schedule the same        Prior history:  Family hx of colon cancer  2/9/2021  Her grandfather and uncle had a colon cancer at the age of 60s     Gastroesophageal reflux disease without esophagitis  2/9/2021  Well controlled with Prilosec 40 g p.o. daily dose  Will consider reducing the dose of 20 mg p.o. daily     Class 2 obesity due to excess calories without serious comorbidity with body mass index (BMI) of 36.0 to 36.9 in adult  2/9/2021  Advised regular exercise half hour every day at least 3 days in a week.  Reduced calorie intake  and lifestyle and dietary changes  have been discussed  We discussed on a losing at least 20 pounds over the next 6 months to 12 months time  Advised to avoid alcohol and smoking cigarette          Follow Up:   No follow-ups on file.      Nery Youssef PA-C  [facility]  3/31/2021  10:06 EDT    Please note that portions of this note may have been completed with a voice recognition program. Efforts were made to edit the dictations, but occasionally words are mistranscribed.

## 2021-03-31 NOTE — PATIENT INSTRUCTIONS
Fiber Foods  It is recommended that you consume 25-45 grams daily.    Fresh & Dried Fruit  Serving Size Fiber (g)    Apples with skin  1 medium 5.0    Apricot  3 medium 1.0    Apricots, dried  4 pieces 2.9    Banana  1 medium 3.9    Blueberries  1 cup 4.2    Cantaloupe, cubes  1 cup 1.3    Figs, dried  2 medium 3.7    Grapefruit  1/2 medium 3.1    Orange, navel  1 medium 3.4    Peach  1 medium 2.0    Peaches, dried  3 pieces 3.2    Pear  1 medium 5.1    Plum  1 medium 1.1    Raisins  1.5 oz box 1.6    Raspberries  1 cup 6.4    Strawberries  1 cup 4.4      Grains, Beans, Nuts & Seeds  Serving Size Fiber (g)    Almonds  1 oz 4.2    Black beans, cooked  1 cup 13.9    Bran cereal  1 cup 19.9    Bread, whole wheat  1 slice 2.0    Brown rice, dry  1 cup 7.9    Cashews  1 oz 1.0    Flax seeds  3 Tbsp. 6.9    Garbanzo beans, cooked  1 cup 5.8    Kidney beans, cooked  1 cup 11.6    Lentils, red cooked  1 cup 13.6    Lima beans, cooked  1 cup 8.6    Oats, rolled dry  1 cup 12.0    Quinoa (seeds) dry  1/4 cup 6.2    Quinoa, cooked  1 cup 8.4    Pasta, whole wheat  1 cup 6.3    Peanuts  1 oz 2.3    Pistachio nuts  1 oz 3.1    Pumpkin seeds  1/4 cup 4.1    Soybeans, cooked  1 cup 8.6    Sunflower seeds  1/4 cup 3.0    Walnuts  1 oz 3.1            Vegetables  Serving Size Fiber (g)    Avocado (fruit)  1 medium 11.8    Beets, cooked  1 cup 2.8    Beet greens  1 cup 4.2    Bok alejandra, cooked  1 cup 2.8    Broccoli, cooked  1 cup 4.5    [place] sprouts, cooked  1 cup 3.6    Cabbage, cooked  1 cup 4.2    Carrot  1 medium 2.6    Carrot, cooked  1 cup 5.2    Cauliflower, cooked  1 cup 3.4    Tim slaw  1 cup 4.0    Lee greens, cooked  1 cup 2.6    Corn, sweet  1 cup 4.6    Green beans  1 cup 4.0    Celery  1 stalk 1.1    Kale, cooked  1 cup 7.2    Onions, raw  1 cup 2.9    Peas, cooked  1 cup 8.8    Peppers, sweet  1 cup 2.6    Pop corn, air-popped  3 cups 3.6    Potato, baked w/ skin  1 medium 4.8    Spinach, cooked  1 cup 4.3     Summer squash, cooked  1 cup 2.5    Sweet potato, cooked  1 medium 4.9    Swiss chard, cooked  1 cup 3.7    Tomato  1 medium 1.0    Winter squash, cooked  1 cup 6.2    Zucchini, cooked  1 cup 2.6

## 2021-04-02 ENCOUNTER — TELEPHONE (OUTPATIENT)
Dept: GASTROENTEROLOGY | Facility: CLINIC | Age: 36
End: 2021-04-02

## 2021-04-02 ENCOUNTER — APPOINTMENT (OUTPATIENT)
Dept: PHARMACY | Facility: HOSPITAL | Age: 36
End: 2021-04-02

## 2021-04-02 ENCOUNTER — PRIOR AUTHORIZATION (OUTPATIENT)
Dept: GASTROENTEROLOGY | Facility: CLINIC | Age: 36
End: 2021-04-02

## 2021-04-02 DIAGNOSIS — K59.04 CHRONIC IDIOPATHIC CONSTIPATION: Primary | ICD-10-CM

## 2021-04-02 LAB
HCV RNA SERPL NAA+PROBE-ACNC: NORMAL IU/ML
TEST INFORMATION: NORMAL

## 2021-04-02 RX ORDER — LUBIPROSTONE 24 UG/1
24 CAPSULE ORAL 2 TIMES DAILY WITH MEALS
Qty: 60 CAPSULE | Refills: 0 | Status: SHIPPED | OUTPATIENT
Start: 2021-04-02 | End: 2021-04-30

## 2021-04-02 NOTE — TELEPHONE ENCOUNTER
Please let pt know that HCV not detected and liver enzymes normal at 4 weeks into therapy with Mavy[facility], continue meds and plan to have labs again at completion of therapy.

## 2021-04-02 NOTE — TELEPHONE ENCOUNTER
Denial states that she must try and fail 2 of the preferred agents. Has already tried linzess in the past. They want her to try Amitiza. I have sent this. Please let her know. She can finish out trial of trulance for now if she has already started samples.

## 2021-04-05 NOTE — TELEPHONE ENCOUNTER
Pt came into the pharmacy to  [facility], Pt was notified of lab results. Pt will continue meds, she is aware that she will need labs drawn at completion of therapy

## 2021-04-06 ENCOUNTER — TELEPHONE (OUTPATIENT)
Dept: GASTROENTEROLOGY | Facility: CLINIC | Age: 36
End: 2021-04-06

## 2021-04-06 DIAGNOSIS — R11.0 NAUSEA: Primary | ICD-10-CM

## 2021-04-06 RX ORDER — ONDANSETRON 4 MG/1
4 TABLET, ORALLY DISINTEGRATING ORAL EVERY 8 HOURS PRN
Qty: 42 TABLET | Refills: 1 | Status: SHIPPED | OUTPATIENT
Start: 2021-04-06 | End: 2022-09-09

## 2021-04-06 NOTE — TELEPHONE ENCOUNTER
----- Message from Leann Alvarado MA sent at 4/6/2021 11:37 AM EDT -----  Asking for refill on Zofran to hospital pharmacy-she works in hospital-having N&V

## 2021-04-12 RX ORDER — PLECANATIDE 3 MG/1
3 TABLET ORAL DAILY
Qty: 7 TABLET | Refills: 0 | COMMUNITY
Start: 2021-04-12 | End: 2021-10-13

## 2021-04-30 ENCOUNTER — DISEASE STATE MANAGEMENT VISIT (OUTPATIENT)
Dept: PHARMACY | Facility: HOSPITAL | Age: 36
End: 2021-04-30

## 2021-04-30 VITALS — DIASTOLIC BLOOD PRESSURE: 72 MMHG | SYSTOLIC BLOOD PRESSURE: 91 MMHG | TEMPERATURE: 97.8 F | HEART RATE: 83 BPM

## 2021-04-30 DIAGNOSIS — K59.04 CHRONIC IDIOPATHIC CONSTIPATION: ICD-10-CM

## 2021-04-30 DIAGNOSIS — B18.2 CHRONIC HEPATITIS C WITHOUT HEPATIC COMA (HCC): Primary | ICD-10-CM

## 2021-04-30 PROCEDURE — 99213 OFFICE O/P EST LOW 20 MIN: CPT | Performed by: PHYSICIAN ASSISTANT

## 2021-04-30 RX ORDER — DOCUSATE SODIUM 100 MG/1
400 CAPSULE, LIQUID FILLED ORAL
COMMUNITY
End: 2021-09-20

## 2021-04-30 RX ORDER — CALCIUM CITRATE/VITAMIN D3 200MG-6.25
TABLET ORAL
COMMUNITY

## 2021-04-30 NOTE — PROGRESS NOTES
Follow Up Note     Date: 2021   Patient Name: Alicia Atkinson  MRN: [id number]  : 1985     Primary Care Provider: Yasmeen Bhatt APRN     Chief Complaint:    Chief Complaint   Patient presents with   • Hepatitis C     Pt here for follow up on Hep C treatment     History of present illness:   2021  Alicia Atkinson is a 35 y.o. female who is here today for follow up regarding Hepatitis C.     She is still taking Mavyret 3 tabs p.o. once daily for treatment of her hepatitis C infection.  She does admit that she missed 3-4 doses sporadically throughout the treatment due to her alternating work schedule between days and nights.  These missed doses were not consecutive days.  She has 3-4 doses left before she has completed the 8-week therapy.  She completed labs at 4 weeks as directed and HCV was not detected.  She is not currently taking any other over-the-counter supplements.      For constipation, she has been taking generic laxatives, up to 4 nightly in order to have bowel movements.  She took Trulance 3 mg once daily samples that were given in the office and these did help with her constipation.  Her insurance has denied this medication.  She has also taken Linzess and Amitiza for constipation without relief.    Interval History:  3/31/2021  She has had constipation throughout her life. Currently she is only having BMs after taking Ex lax chocolate bar. She has tried senokot, miralax, stool softeners and Linzess (unsure of dosage) in the past without relief. Has history of needing to manually disimpact. She does take Suboxone daily as prescribed by her physician. She has been working on losing weight with diet modification, low carb diet. Has lost 3 lbs since her last visit approx 1 month ago. She had an episode recently of bloating which was more prominent on her left abdomen, pain and rectal bleeding. Also had colonoscopy recently and would like to discuss those results.      For treatment of  hepatitis C infection, she has been taking mavyret 3 tabs PO once daily. She has not missed any doses of this medication. Has not noticed any side effects with it. She is on week 4/8 of treatment.       2/9/2021  She has chronic constipation since she was a teenager. Her bowel movements are like once a week. Associated bloating and occasional cramps diffuse. She takes laxative bar every week. She was on subaxone since year or so. She did mialax every day , did not help. Tried dulcolax when she is severely constipated that causes significant cramps. She also has nausea, but no vomiting.      She will see red blood in wipes occasionally. No recent change in bowel habit, hematochezia or melena.  Weight is stable. Pt denies  odynophagia or dysphagia. There is history of acid reflux on ppi daily. There is no history of anemia. No prior history of EGD. Last colonoscopy was 2010 and had two polyps removed. Family history of colon cancer ; Grand father and uncle had colon cancer. No history of any abdominal surgery. Denies alcohol abuse or cigarette smoking. She still vapes.   She had recent  blood work done that was positive for hep c antibody. She had prior history of substance abuse and she is clean.   She has RA and was on hunira. Humira on hold now as her hep c antibody came out postive.     Subjective     Past Medical History:   Diagnosis Date   • ADD (attention deficit disorder)    • Anxiety    • Bipolar affective disorder, manic (CMS/HCC)    • Body piercing     both ears   • Borderline personality disorder (CMS/HCC)    • Depression    • Fibromyalgia syndrome    • Full dentures    • Hepatitis C    • Lupus (systemic lupus erythematosus) (CMS/HCC)    • Migraine headache    • Pancreatitis    • RA (rheumatoid arthritis) (CMS/HCC)    • Seizures (CMS/HCC)     withdrawing from opiates/benzos   • Tachycardia    • Tattoos    • White matter abnormality on MRI of brain      Past Surgical History:   Procedure Laterality Date   •  COLONOSCOPY N/A 3/1/2021    Procedure: COLONOSCOPY WITH BIOPSY AND POLYPECTOMY;  Surgeon: Rui Palacios MD;  Location: [facility] ENDOSCOPY;  Service: Gastroenterology;  Laterality: N/A;   • CYST REMOVAL  1991    back    • DILATATION AND CURETTAGE     • MOUTH SURGERY      teeth removal   • TONSILLECTOMY AND ADENOIDECTOMY     • TUBAL ABDOMINAL LIGATION       Family History   Problem Relation Age of Onset   • Colon cancer Paternal Grandfather    • Cirrhosis Neg Hx    • Liver cancer Neg Hx      Social History     Socioeconomic History   • Marital status:      Spouse name: Not on file   • Number of children: Not on file   • Years of education: Not on file   • Highest education level: Not on file   Tobacco Use   • Smoking status: Former Smoker     Packs/day: 1.50     Types: Electronic Cigarette, Cigarettes     Start date:      Quit date:      Years since quittin.3   • Smokeless tobacco: Never Used   Vaping Use   • Vaping Use: Every day   • Start date: 2020   • Substances: Nicotine, Flavoring   • Devices: Disposable, Refillable tank   Substance and Sexual Activity   • Alcohol use: Yes     Comment: social   • Drug use: Yes     Types: Heroin     Comment: 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     • Sexual activity: Defer       Current Outpatient Medications:   •  baclofen (LIORESAL) 20 MG tablet, Take 20 mg by mouth At Night As Needed for Muscle Spasms., Disp: , Rfl:   •  buprenorphine-naloxone (SUBOXONE) 8-2 MG per SL tablet, Place 1.25 tablets under the tongue Daily., Disp: , Rfl:   •  busPIRone (BUSPAR) 15 MG tablet, Take 15 mg by mouth As Needed., Disp: , Rfl:   •  docusate sodium (COLACE) 100 MG capsule, Take 400 mg by mouth every night at bedtime., Disp: , Rfl:   •  folic acid (FOLVITE) 1 MG tablet, Take 1 mg by mouth Daily., Disp: , Rfl: 3  •  furosemide (LASIX) 20 MG tablet, Take 20 mg by mouth Daily., Disp: , Rfl: 0  •  gabapentin (NEURONTIN) 800 MG tablet, Take 800 mg by mouth 3  (Three) Times a Day As Needed., Disp: , Rfl: 3  •  Glecaprevir-Pibrentasvir (Mavyret) 100-40 MG tablet, Take 3 tablets by mouth Daily., Disp: 168 tablet, Rfl: 0  •  ibuprofen (ADVIL,MOTRIN) 600 MG tablet, Take 1 tablet by mouth Every 6 (Six) Hours As Needed for Mild Pain ., Disp: 30 tablet, Rfl: 0  •  L-Tyrosine 500 MG capsule, Take 2 capsules by mouth Daily., Disp: , Rfl:   •  medroxyPROGESTERone (DEPO-PROVERA) 150 MG/ML injection, Inject 150 mg into the appropriate muscle as directed by prescriber Every 3 (Three) Months., Disp: , Rfl:   •  methotrexate 2.5 MG tablet, Take 12.5 mg by mouth 1 (One) Time Per Week. Take 4 pills weekly, Disp: , Rfl: 3  •  multivitamin with minerals (MULTIVITAMIN ADULTS PO), Take 1 tablet by mouth Daily. On A Ketone/Vitamin drink regimen, Disp: , Rfl:   •  omeprazole (priLOSEC) 40 MG capsule, Take 40 mg by mouth Daily., Disp: , Rfl: 0  •  ondansetron ODT (ZOFRAN-ODT) 4 MG disintegrating tablet, Place 1 tablet on the tongue Every 8 (Eight) Hours As Needed for Nausea., Disp: 42 tablet, Rfl: 1  •  predniSONE (DELTASONE) 10 MG tablet, Take 20 mg by mouth Daily. Pt states dosage flucuates, Disp: , Rfl:   •  Sennosides (Chocolated Laxative) 15 MG chewable tablet, Chew. 4 squares per week, Disp: , Rfl:   •  Ubrogepant (UBRELVY PO), Take  by mouth As Needed. Migraines, Disp: , Rfl:   •  Unable to find, 1 each 1 (One) Time. Keto-Os, 2 packets daily, Disp: , Rfl:   •  acetaminophen (TYLENOL 8 HOUR) 650 MG 8 hr tablet, Take 1 tablet by mouth Every 8 (Eight) Hours As Needed for Mild Pain ., Disp: 15 tablet, Rfl: 0  •  buPROPion XL (WELLBUTRIN XL) 150 MG 24 hr tablet, Take 150 mg by mouth every night at bedtime., Disp: , Rfl:   •  DULoxetine (CYMBALTA) 60 MG capsule, Take 60 mg by mouth Daily., Disp: , Rfl:   •  Loratadine (CLARITIN PO), Take  by mouth As Needed., Disp: , Rfl:     Allergies   Allergen Reactions   • Iodine Hives   • Latex Hives       The following portions of the patient's history  were reviewed and updated as appropriate: allergies, current medications, past family history, past medical history, past social history, past surgical history and problem list.  Objective     Physical Exam  Constitutional:       General: She is not in acute distress.     Appearance: Normal appearance. She is well-developed. She is not diaphoretic.   HENT:      Head: Normocephalic and atraumatic.      Right Ear: External ear normal.      Left Ear: External ear normal.      Nose: Nose normal.      Mouth/Throat:      Comments: Wearing a mask  Eyes:      General: No scleral icterus.        Right eye: No discharge.         Left eye: No discharge.      Conjunctiva/sclera: Conjunctivae normal.   Neck:      Trachea: No tracheal deviation.   Pulmonary:      Effort: Pulmonary effort is normal. No respiratory distress.   Musculoskeletal:         General: Normal range of motion.      Cervical back: Normal range of motion.   Skin:     Coloration: Skin is not pale.      Findings: No erythema or rash.   Neurological:      Mental Status: She is alert and oriented to person, place, and time.      Coordination: Coordination normal.   Psychiatric:         Mood and Affect: Mood normal.         Behavior: Behavior normal.         Thought Content: Thought content normal.         Judgment: Judgment normal.       Vitals:    04/30/21 1237   BP: 91/72   BP Location: Right arm   Patient Position: Sitting   Pulse: 83   Temp: 97.8 °F (36.6 °C)       Results Review:   I reviewed the patient's new clinical results.    Lab on 03/31/2021   Component Date Value Ref Range Status   • Hepatitis C Quantitation 03/31/2021 HCV Not Detected  IU/mL Final   • Test Information 03/31/2021 Comment   Final    The quantitative range of this assay is 15 IU/mL to 100 million IU/mL.   • Glucose 03/31/2021 104* 65 - 99 mg/dL Final   • BUN 03/31/2021 11  6 - 20 mg/dL Final   • Creatinine 03/31/2021 0.88  0.57 - 1.00 mg/dL Final   • Sodium 03/31/2021 135* 136 - 145  mmol/L Final   • Potassium 03/31/2021 3.7  3.5 - 5.2 mmol/L Final   • Chloride 03/31/2021 101  98 - 107 mmol/L Final   • CO2 03/31/2021 21.8* 22.0 - 29.0 mmol/L Final   • Calcium 03/31/2021 9.4  8.6 - 10.5 mg/dL Final   • Total Protein 03/31/2021 7.4  6.0 - 8.5 g/dL Final   • Albumin 03/31/2021 4.50  3.50 - 5.20 g/dL Final   • ALT (SGPT) 03/31/2021 16  1 - 33 U/L Final   • AST (SGOT) 03/31/2021 19  1 - 32 U/L Final   • Alkaline Phosphatase 03/31/2021 70  39 - 117 U/L Final   • Total Bilirubin 03/31/2021 0.7  0.0 - 1.2 mg/dL Final   • eGFR Non  Amer 03/31/2021 72  >60 mL/min/1.73 Final   • Globulin 03/31/2021 2.9  gm/dL Final   • A/G Ratio 03/31/2021 1.6  g/dL Final   • BUN/Creatinine Ratio 03/31/2021 12.5  7.0 - 25.0 Final   • Anion Gap 03/31/2021 12.2  5.0 - 15.0 mmol/L Final   Admission on 03/01/2021, Discharged on 03/01/2021   Component Date Value Ref Range Status   • SARS-CoV-2 CRICKET 02/26/2021 Not Detected  Not Detected Final   • HCG, Urine, QL 03/01/2021 Negative  Negative Final   • Lot Number 03/01/2021 [id number]   Final   • Internal Positive Control 03/01/2021 Positive   Final   • Internal Negative Control 03/01/2021 Negative   Final   • Case Report 03/01/2021    Final                    Value:Surgical Pathology Report                         Case: [id number]                                  Authorizing Provider:  Rui Palacios MD  Collected:           03/01/2021 11:58 AM          Ordering Location:     [facility]    Received:            03/01/2021 01:58 PM                                 [facility]                                                                    Pathologist:           Neeraj Meyers MD                                                       Specimens:   1) - Small Intestine, Ileum, TI BIOPSY CHANGE IN BOWEL HABITS                                       2) - Large Intestine, Left / Descending Colon, random colon biopsies for change in                  bowel habits                                                                                         3) - Large Intestine, Sigmoid Colon, rectal sigmoid x4                                    • Final Diagnosis 2021    Final                    Value:This result contains rich text formatting which cannot be displayed here.   Lab on 02/10/2021   Component Date Value Ref Range Status   • Fibrosis Score 02/10/2021 0.03  0.00 - 0.21 Final   • Fibrosis Stage 02/10/2021 Comment   Final                       F0 - No fibrosis   • Necroinflammat Activity Score 02/10/2021 0.05  0.00 - 0.17 Final   • Necroinflammat Activity Grade 02/10/2021 A0-No activity   Final   • Alpha 2-Macroglobulins, Qn 02/10/2021 245  110 - 276 mg/dL Final   • Haptoglobin 02/10/2021 149  33 - 278 mg/dL Final   • Apolipoprotein A-1 02/10/2021 226* 116 - 209 mg/dL Final   • Total Bilirubin 02/10/2021 0.3  0.0 - 1.2 mg/dL Final   • GGT 02/10/2021 12  0 - 60 IU/L Final   • ALT (SGPT) 02/10/2021 20  0 - 40 IU/L Final   • HCV Qual Interp 02/10/2021 Comment   Final    Quantitative results of 6 biochemical tests are analyzed using  a computational algorithm to provide a quantitative surrogate  marker (0.0-1.0) for liver fibrosis (METAVIR F0-F4) and for  necroinflammatory activity (METAVIR A0-A3).   • Fibrosis Scorin/10/2021 Comment   Final          <0.21 = Stage F0 - No fibrosis  0.21 - 0.27 = Stage F0 - F1  0.27 - 0.31 = Stage F1 - Portal fibrosis  0.31 - 0.48 = Stage F1 - F2  0.48 - 0.58 = Stage F2 - Bridging fibrosis with few septa  0.58 - 0.72 = Stage F3 - Bridging fibrosis with many septa  0.72 - 0.74 = Stage F3 - F4        >0.74 = Stage F4 - Cirrhosis   • Necroinflamm Activity Scorin/10/2021 Comment   Final          <0.17 = Grade A0 - No Activity  0.17 - 0.29 = Grade A0 - A1  0.29 - 0.36 = Grade A1 - Minimal activity  0.36 - 0.52 = Grade A1 - A2  0.52 - 0.60 = Grade A2 - Moderate activity  0.60 - 0.62 = Grade A2 - A3        >0.62 = Grade A3 - Severe  activity   • Limitations: 02/10/2021 Comment   Final   • Hep A Total Ab 02/10/2021 Positive* Negative Final   • Hep B Core Total Ab 02/10/2021 Negative  Negative Final   • Hep B S Ab 02/10/2021 Reactive* Non-Reactive Final   • Hepatitis B Surface Ag 02/10/2021 Non-Reactive  Non-Reactive Final   • Hepatitis C Ab 02/10/2021 Reactive* Non-Reactive Final   • WBC 02/10/2021 6.89  3.40 - 10.80 10*3/mm3 Final   • RBC 02/10/2021 4.27  3.77 - 5.28 10*6/mm3 Final   • Hemoglobin 02/10/2021 14.3  12.0 - 15.9 g/dL Final   • Hematocrit 02/10/2021 42.0  34.0 - 46.6 % Final   • MCV 02/10/2021 98.4* 79.0 - 97.0 fL Final   • MCH 02/10/2021 33.5* 26.6 - 33.0 pg Final   • MCHC 02/10/2021 34.0  31.5 - 35.7 g/dL Final   • RDW 02/10/2021 13.2  12.3 - 15.4 % Final   • RDW-SD 02/10/2021 46.6  37.0 - 54.0 fl Final   • MPV 02/10/2021 9.8  6.0 - 12.0 fL Final   • Platelets 02/10/2021 288  140 - 450 10*3/mm3 Final   • Neutrophil % 02/10/2021 44.5  42.7 - 76.0 % Final   • Lymphocyte % 02/10/2021 41.1  19.6 - 45.3 % Final   • Monocyte % 02/10/2021 8.9  5.0 - 12.0 % Final   • Eosinophil % 02/10/2021 4.8  0.3 - 6.2 % Final   • Basophil % 02/10/2021 0.4  0.0 - 1.5 % Final   • Immature Grans % 02/10/2021 0.3  0.0 - 0.5 % Final   • Neutrophils, Absolute 02/10/2021 3.07  1.70 - 7.00 10*3/mm3 Final   • Lymphocytes, Absolute 02/10/2021 2.83  0.70 - 3.10 10*3/mm3 Final   • Monocytes, Absolute 02/10/2021 0.61  0.10 - 0.90 10*3/mm3 Final   • Eosinophils, Absolute 02/10/2021 0.33  0.00 - 0.40 10*3/mm3 Final   • Basophils, Absolute 02/10/2021 0.03  0.00 - 0.20 10*3/mm3 Final   • Immature Grans, Absolute 02/10/2021 0.02  0.00 - 0.05 10*3/mm3 Final   • nRBC 02/10/2021 0.0  0.0 - 0.2 /100 WBC Final   • Glucose 02/10/2021 98  65 - 99 mg/dL Final   • BUN 02/10/2021 12  6 - 20 mg/dL Final   • Creatinine 02/10/2021 0.88  0.57 - 1.00 mg/dL Final   • Sodium 02/10/2021 139  136 - 145 mmol/L Final   • Potassium 02/10/2021 3.9  3.5 - 5.2 mmol/L Final   • Chloride  02/10/2021 103  98 - 107 mmol/L Final   • CO2 02/10/2021 29.1* 22.0 - 29.0 mmol/L Final   • Calcium 02/10/2021 9.1  8.6 - 10.5 mg/dL Final   • Total Protein 02/10/2021 6.7  6.0 - 8.5 g/dL Final   • Albumin 02/10/2021 4.10  3.50 - 5.20 g/dL Final   • ALT (SGPT) 02/10/2021 16  1 - 33 U/L Final   • AST (SGOT) 02/10/2021 13  1 - 32 U/L Final   • Alkaline Phosphatase 02/10/2021 49  39 - 117 U/L Final   • Total Bilirubin 02/10/2021 0.4  0.0 - 1.2 mg/dL Final   • eGFR Non  Amer 02/10/2021 72  >60 mL/min/1.73 Final   • Globulin 02/10/2021 2.6  gm/dL Final   • A/G Ratio 02/10/2021 1.6  g/dL Final   • BUN/Creatinine Ratio 02/10/2021 13.6  7.0 - 25.0 Final   • Anion Gap 02/10/2021 6.9  5.0 - 15.0 mmol/L Final   • Please note 02/10/2021 Comment   Final    This test was developed and its performance characteristics determined  by [employer].  It has not been cleared or approved by the U.S. Food and  Drug Administration.  The FDA has determined that such clearance or approval is not  necessary. This test is used for clinical purposes.  It should not be  regarded as investigational or for research.   • Hepatitis C Genotype 02/10/2021 3   Final   • Hepatitis C Quantitation 02/10/2021 467122  IU/mL Final   • HCV log10 02/10/2021 5.574  log10 IU/mL Final   • Test Information 02/10/2021 Comment   Final    The quantitative range of this assay is 15 IU/mL to 100 million IU/mL.   • Protime 02/10/2021 12.1  12.0 - 15.1 Seconds Final   • INR 02/10/2021 0.87* 0.90 - 1.10 Final   • HIV-1/ HIV-2 Ab 02/10/2021 Non-Reactive  Non-Reactive Final   • HIV-1 P24 Ag Screen 02/10/2021 Non-Reactive  Non-Reactive Final      XR Hand 3+ View Right  Result Date: 3/5/2021  Unremarkable exam.        US Abdomen Complete  Result Date: 2/25/2021  Cholecystectomy. Otherwise, unremarkable ultrasound of the abdomen.        Assessment / Plan      1. Chronic hepatitis C without hepatic coma (CMS/HCC)  4/30/2021  She is near completion of treatment with Mavyret  for her chronic hepatitis C infection.  She has 3-4 doses left of this medication.  She does admit that she did miss some doses of this medication throughout the treatment but these were not consecutive days.  She will have CMP and HCVRNA quant at treatment completion.  Labs to be completed again in 12 weeks for confirmation of SVR.  She understands that after this treatment she is not immune to contract hepatitis C again if she continues any risky behaviors.  - Comprehensive Metabolic Panel; Future  - Hepatitis C RNA, Quantitative, PCR (graph); Future    3/31/2021  She has chronic hepatitis C infection, treatment naive, without cirrhosis. She has already started treatment with Mavyret 3 tabs PO once daily, has been taking this mediction for approx 3.5 weeks. She will continue this exactly as directed without missing any doses. Hep C viral load lab (HCV RNA quant) and CMP will be checked within the next several days which will be 4 weeks after start of therapy. She will have labs again at end of therapy and 3 months s/p therapy to determine response to treatment and cure. She will continue to abstain from alcohol use at this time and agrees not to use illegal drugs. She understands to avoid any high risk behavior to prevent any reinfection during treatment and after treatment.  - Comprehensive Metabolic Panel; Future  - Hepatitis C RNA, Quantitative, PCR (graph); Future     2/9/2021  Patient has a chronic hep C infection genotype 3, CTP class A, no cirrhosis, no prior history of any liver cancer or liver transplantation.   Hepatitis C FibroSure shows F0- No fibrosis.  HIV test is negative.  She is immune to hepatitis A and hepatitis B.   I had a brief discussion on treatment, adverse reactions to medications, follow-up during treatment and after treatment.  Also had a discussion on avoiding high risk behavior during treatment after treatment to prevent reinfection.   We will start her on Mavyret 8 weeks  treatment  She had a blood work done at rheumatology office that showed hep C antibody positive.  Patient does have history of substance abuse and she thinks that she contracted hep C more than 6 years ago.  We will get the rest of the blood work with antipation to treat her hep C infection     2. Chronic idiopathic constipation  4/30/2021  She has tried both Amitiza and Linzess without relief of constipation.  Took Trulance 3 mg once daily samples which dramatically improved her constipation complaints.  Will retry for approval of Trulance.    3/31/2021  She has chronic constipation, likely made worse by her suboxone therapy. She has tried and failed several constipation therapy regimens including miralax, senokot, ex lax, stool softeners and previous Linzess therapy. Her insurance does not appear to cover any OIC therapies. Start taking Trulance 3 mg once daily with or without food for treatment of constipation. Samples were given today. Trulance is a secretory stimulant (guanylate cyclase agonist) used to treat constipation by acting like the natural uroguanylin which helps to provide the appropriate amount of fluid in the intestines. She was instructed to increase dietary fiber intake to 25-45g daily and a list of fiber foods was given. She has agreed to try to increase daily water intake and daily exercise as well.   - Plecanatide 3 MG tablet; Take 1 tablet by mouth Daily.  Dispense: 30 tablet; Refill: 5     2/9/2021  Patient appears to have irritable bowel syndrome at baseline and constipation got worse with the use of Suboxone since for the last couple of years..   He always had a constipation issue since he was a teenager along with abdominal bloating and abdominal cramps.  She currently uses MiraLAX daily and laxative about every week with a bowel movement once in a week.   Unfortunately patient's insurance does not cover any newer medications.  She tried Dulcolax which gave her significant cramping in the  past.  We will start her on senna 2 tablets p.o. daily along with a bowel regimen and will see whether that helps her                 Prior history:  Hyperplastic polyp of sigmoid colon  3/31/2021  She had 4 hyperplastic rectosigmoid polyps removed on recent colonoscopy. These are not considered to be pre-cancerous colon polyps. She will need to have repeat [facility] colonoscopy at age 43. We will place her on the recall list to be called to schedule an appointment closer to that date. She was instructed to call the office if no reminder call is made within the proper number of years. Also, the indications for a repeat colonoscopy sooner than the recall date were discussed; rectal bleeding, melena, change in bowel habits or significant abdominal pain.    2/9/2021  Patient states that she had a colonoscopy done in 2010 at [facility] and had at least 2 polyps removed and she was been told that she needs repeat colonoscopy in 3 years time.  She did not keep up the appointment.  He is due for colonoscopy now will schedule the same    Family hx of colon cancer  2/9/2021  Her grandfather and uncle had a colon cancer at the age of 60s     Gastroesophageal reflux disease without esophagitis  2/9/2021  Well controlled with Prilosec 40 g p.o. daily dose  Will consider reducing the dose of 20 mg p.o. daily     Class 2 obesity due to excess calories without serious comorbidity with body mass index (BMI) of 36.0 to 36.9 in adult  2/9/2021  Advised regular exercise half hour every day at least 3 days in a week.  Reduced calorie intake  and lifestyle and dietary changes  have been discussed  We discussed on a losing at least 20 pounds over the next 6 months to 12 months time  Advised to avoid alcohol and smoking cigarette      Follow Up:   Return in about 4 months (around 8/30/2021) for recheck Hep C.      Nery Youssef PA-C  [facility] [place]  4/30/2021  16:45 EDT    Please note that portions of this note may have been  completed with a voice recognition program. Efforts were made to edit the dictations, but occasionally words are mistranscribed.

## 2021-04-30 NOTE — TELEPHONE ENCOUNTER
Pt has now tried and failed Linzess and Amitiza. She did well with trulance previously and would like to take it. She should be approved now based on review of previous denial.

## 2021-05-06 ENCOUNTER — PRIOR AUTHORIZATION (OUTPATIENT)
Dept: GASTROENTEROLOGY | Facility: CLINIC | Age: 36
End: 2021-05-06

## 2021-06-11 ENCOUNTER — LAB (OUTPATIENT)
Dept: LAB | Facility: HOSPITAL | Age: 36
End: 2021-06-11

## 2021-06-11 DIAGNOSIS — B18.2 CHRONIC HEPATITIS C WITHOUT HEPATIC COMA (HCC): ICD-10-CM

## 2021-06-11 LAB
ALBUMIN SERPL-MCNC: 4.2 G/DL (ref 3.5–5.2)
ALBUMIN/GLOB SERPL: 1.6 G/DL
ALP SERPL-CCNC: 53 U/L (ref 39–117)
ALT SERPL W P-5'-P-CCNC: 19 U/L (ref 1–33)
ANION GAP SERPL CALCULATED.3IONS-SCNC: 5.6 MMOL/L (ref 5–15)
AST SERPL-CCNC: 16 U/L (ref 1–32)
BILIRUB SERPL-MCNC: 0.4 MG/DL (ref 0–1.2)
BUN SERPL-MCNC: 11 MG/DL (ref 6–20)
BUN/CREAT SERPL: 12.5 (ref 7–25)
CALCIUM SPEC-SCNC: 9.1 MG/DL (ref 8.6–10.5)
CHLORIDE SERPL-SCNC: 101 MMOL/L (ref 98–107)
CO2 SERPL-SCNC: 27.4 MMOL/L (ref 22–29)
CREAT SERPL-MCNC: 0.88 MG/DL (ref 0.57–1)
GFR SERPL CREATININE-BSD FRML MDRD: 72 ML/MIN/1.73
GLOBULIN UR ELPH-MCNC: 2.7 GM/DL
GLUCOSE SERPL-MCNC: 89 MG/DL (ref 65–99)
POTASSIUM SERPL-SCNC: 4 MMOL/L (ref 3.5–5.2)
PROT SERPL-MCNC: 6.9 G/DL (ref 6–8.5)
SODIUM SERPL-SCNC: 134 MMOL/L (ref 136–145)

## 2021-06-11 PROCEDURE — 87522 HEPATITIS C REVRS TRNSCRPJ: CPT

## 2021-06-11 PROCEDURE — 36415 COLL VENOUS BLD VENIPUNCTURE: CPT

## 2021-06-11 PROCEDURE — 80053 COMPREHEN METABOLIC PANEL: CPT

## 2021-06-13 LAB
HCV RNA SERPL NAA+PROBE-ACNC: NORMAL IU/ML
TEST INFORMATION: NORMAL

## 2021-06-21 ENCOUNTER — TELEPHONE (OUTPATIENT)
Dept: GASTROENTEROLOGY | Facility: CLINIC | Age: 36
End: 2021-06-21

## 2021-06-21 ENCOUNTER — HOSPITAL ENCOUNTER (EMERGENCY)
Facility: HOSPITAL | Age: 36
Discharge: HOME | End: 2021-06-21
Attending: EMERGENCY MEDICINE | Admitting: EMERGENCY MEDICINE

## 2021-06-21 VITALS
TEMPERATURE: 98.2 F | OXYGEN SATURATION: 97 % | RESPIRATION RATE: 18 BRPM | BODY MASS INDEX: 37.09 KG/M2 | DIASTOLIC BLOOD PRESSURE: 86 MMHG | WEIGHT: 184 LBS | SYSTOLIC BLOOD PRESSURE: 124 MMHG | HEART RATE: 92 BPM | HEIGHT: 59 IN

## 2021-06-21 DIAGNOSIS — M25.561 ACUTE PAIN OF RIGHT KNEE: Primary | ICD-10-CM

## 2021-06-21 DIAGNOSIS — B18.2 CHRONIC HEPATITIS C WITHOUT HEPATIC COMA (HCC): Primary | ICD-10-CM

## 2021-06-21 DIAGNOSIS — G89.29 CHRONIC PAIN OF LEFT ANKLE: ICD-10-CM

## 2021-06-21 DIAGNOSIS — M25.572 CHRONIC PAIN OF LEFT ANKLE: ICD-10-CM

## 2021-06-21 PROCEDURE — 96372 THER/PROPH/DIAG INJ SC/IM: CPT

## 2021-06-21 PROCEDURE — 99282 EMERGENCY DEPT VISIT SF MDM: CPT

## 2021-06-21 PROCEDURE — 25010000002 METHYLPREDNISOLONE PER 40 MG: Performed by: EMERGENCY MEDICINE

## 2021-06-21 RX ORDER — PREDNISONE 20 MG/1
TABLET ORAL
Qty: 18 TABLET | Refills: 0 | Status: SHIPPED | OUTPATIENT
Start: 2021-06-21 | End: 2021-06-30

## 2021-06-21 RX ORDER — METHYLPREDNISOLONE SODIUM SUCCINATE 40 MG/ML
80 INJECTION, POWDER, LYOPHILIZED, FOR SOLUTION INTRAMUSCULAR; INTRAVENOUS ONCE
Status: COMPLETED | OUTPATIENT
Start: 2021-06-21 | End: 2021-06-21

## 2021-06-21 RX ADMIN — METHYLPREDNISOLONE SODIUM SUCCINATE 80 MG: 40 INJECTION, POWDER, FOR SOLUTION INTRAMUSCULAR; INTRAVENOUS at 11:57

## 2021-06-21 NOTE — TELEPHONE ENCOUNTER
She completed treatment completion labs late but they show HCV not detected and liver enzymes normal. Needs to have labs again at 3 mo s/p completion of treatment then f/u 1-2 weeks after. Please arrange. Thanks.

## 2021-06-21 NOTE — ED PROVIDER NOTES
TRIAGE CHIEF COMPLAINT:     Nursing and triage notes reviewed    Chief Complaint   Patient presents with   • Knee Pain      HPI: Alicia Atkinson is a 35 y.o. female who presents to the emergency department complaining of left ankle and right knee discomfort.  Patient states areas have been hurting for the past several days.  Patient has a history of rheumatoid arthritis and states that one of her hotspots is her left foot and ankle.  She states that she compensates by walking on her right knee.  She states this is her typical rheumatoid arthritis pain.  She denies any direct trauma.  No numbness or tingling.  He states typically a steroid shot will help her symptoms.    REVIEW OF SYSTEMS: All other systems reviewed and are negative     PAST MEDICAL HISTORY:   Past Medical History:   Diagnosis Date   • ADD (attention deficit disorder)    • Anxiety    • Bipolar affective disorder, manic (CMS/HCC)    • Body piercing     both ears   • Borderline personality disorder (CMS/HCC)    • Depression    • Fibromyalgia syndrome    • Full dentures    • Hepatitis C    • Lupus (systemic lupus erythematosus) (CMS/HCC)    • Migraine headache    • Pancreatitis    • RA (rheumatoid arthritis) (CMS/HCC)    • Seizures (CMS/HCC)     withdrawing from opiates/benzos   • Tachycardia    • Tattoos    • White matter abnormality on MRI of brain         FAMILY HISTORY:   Family History   Problem Relation Age of Onset   • Colon cancer Paternal Grandfather    • Cirrhosis Neg Hx    • Liver cancer Neg Hx         SOCIAL HISTORY:   Social History     Socioeconomic History   • Marital status:      Spouse name: Not on file   • Number of children: Not on file   • Years of education: Not on file   • Highest education level: Not on file   Tobacco Use   • Smoking status: Former Smoker     Packs/day: 1.50     Types: Electronic Cigarette, Cigarettes     Start date:      Quit date: 2020     Years since quittin.4   • Smokeless tobacco: Never Used    Vaping Use   • Vaping Use: Every day   • Start date: 11/1/2020   • Substances: Nicotine, Flavoring   • Devices: Disposable, Refillable tank   Substance and Sexual Activity   • Alcohol use: Yes     Comment: social   • Drug use: Yes     Types: Heroin     Comment: 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     • Sexual activity: Defer        SURGICAL HISTORY:   Past Surgical History:   Procedure Laterality Date   • COLONOSCOPY N/A 3/1/2021    Procedure: COLONOSCOPY WITH BIOPSY AND POLYPECTOMY;  Surgeon: Rui Palacios MD;  Location: [facility] ENDOSCOPY;  Service: Gastroenterology;  Laterality: N/A;   • CYST REMOVAL  1991    back    • DILATATION AND CURETTAGE     • MOUTH SURGERY      teeth removal   • TONSILLECTOMY AND ADENOIDECTOMY  2004   • TUBAL ABDOMINAL LIGATION  2004        CURRENT MEDICATIONS:      Medication List      ASK your doctor about these medications    acetaminophen 650 MG 8 hr tablet  Commonly known as: Tylenol 8 Hour  Take 1 tablet by mouth Every 8 (Eight) Hours As Needed for Mild Pain .     baclofen 20 MG tablet  Commonly known as: LIORESAL     buprenorphine-naloxone 8-2 MG per SL tablet  Commonly known as: SUBOXONE     buPROPion  MG 24 hr tablet  Commonly known as: WELLBUTRIN XL     busPIRone 15 MG tablet  Commonly known as: BUSPAR     Chocolated Laxative 15 MG chewable tablet  Generic drug: Sennosides     CLARITIN PO     docusate sodium 100 MG capsule  Commonly known as: COLACE     DULoxetine 60 MG capsule  Commonly known as: CYMBALTA     folic acid 1 MG tablet  Commonly known as: FOLVITE     furosemide 20 MG tablet  Commonly known as: LASIX     gabapentin 800 MG tablet  Commonly known as: NEURONTIN     ibuprofen 600 MG tablet  Commonly known as: ADVIL,MOTRIN  Take 1 tablet by mouth Every 6 (Six) Hours As Needed for Mild Pain .     L-Tyrosine 500 MG capsule     Mavyret 100-40 MG tablet  Generic drug: Glecaprevir-Pibrentasvir  Take 3 tablets by mouth Daily.     medroxyPROGESTERone 150 MG/ML  injection  Commonly known as: DEPO-PROVERA     methotrexate 2.5 MG tablet     multivitamin with minerals tablet tablet     omeprazole 40 MG capsule  Commonly known as: priLOSEC     ondansetron ODT 4 MG disintegrating tablet  Commonly known as: ZOFRAN-ODT  Place 1 tablet on the tongue Every 8 (Eight) Hours As Needed for Nausea.     predniSONE 10 MG tablet  Commonly known as: DELTASONE     * Trulance 3 MG tablet  Generic drug: Plecanatide  Take 1 tablet by mouth Daily.     * Plecanatide 3 MG tablet  Take 1 tablet by mouth Daily.     UBRELVY PO     Unable to find         * This list has 2 medication(s) that are the same as other medications prescribed for you. Read the directions carefully, and ask your doctor or other care provider to review them with you.                 ALLERGIES: Iodine and Latex     PHYSICAL EXAM:   VITAL SIGNS:   Vitals:    06/21/21 1050   BP: 124/86   Pulse: 92   Resp: 18   Temp: 98.2 °F (36.8 °C)   SpO2: 97%      CONSTITUTIONAL: Awake, oriented, appears non-toxic   HENT: Atraumatic, normocephalic, oral mucosa pink and moist, airway patent. Nares patent without drainage. External ears normal.   EYES: Conjunctiva clear   NECK: Trachea midline  CARDIOVASCULAR: Normal heart rate, Normal rhythm, No murmurs, rubs, gallops   PULMONARY/CHEST: Clear to auscultation, no rhonchi, wheezes, or rales. Symmetrical breath sounds   ABDOMINAL: Non-distended, soft, non-tender - no rebound or guarding.  NEUROLOGIC: Non-focal, moving all four extremities, no gross sensory or motor deficits.   EXTREMITIES: No clubbing, cyanosis.  Mild tenderness on the lateral aspect of patient's left foot and left ankle.  No obvious overlying erythema or swelling.  Pulses are intact as is capillary refill.  There is mild discomfort with palpation globally over the right knee but no erythema or bruising.  There is full range of motion.  SKIN: Warm, Dry, No erythema, No rash     ED COURSE / MEDICAL DECISION MAKING:   Alicia Atkinson is  a 35 y.o. female who presents to the emergency department for evaluation of left foot and right knee discomfort.  Patient is nondistressed on arrival in the emergency department.  Vital signs are stable.  Exam does reveal some tenderness at the left foot and right knee.  There is no obvious sign of infection.  Patient symptoms are consistent with her history of rheumatoid arthritis.  These are a traumatic injuries and I do not feel imaging is currently beneficial.  We will give patient dose of steroids and steroid taper to see if this helps her symptoms.  Return precautions were discussed.    DECISION TO DISCHARGE/ADMIT: see ED care timeline     FINAL IMPRESSION:   1 --knee pain  2 --ankle pain  3 --     Electronically signed by: Joellen Cee MD, 6/21/2021 12:12 EDT       Joellen Cee MD  06/21/21 3474

## 2021-06-21 NOTE — TELEPHONE ENCOUNTER
Spoke with patient and gave her the lab results, pt is very pleased with them.     Pt informed that she will need to have labs drawn again 12 weeks from completion.  Pt states she started taking meds on 3/5/21 and completed on 4/30/21.     Labs will be due on 7/23/21 and follow up on 8/6/21.     Appt scheduled and reminder letter mailed with lab due date noted.

## 2021-07-26 ENCOUNTER — LAB (OUTPATIENT)
Dept: LAB | Facility: HOSPITAL | Age: 36
End: 2021-07-26

## 2021-07-26 DIAGNOSIS — B18.2 CHRONIC HEPATITIS C WITHOUT HEPATIC COMA (HCC): ICD-10-CM

## 2021-07-26 PROCEDURE — 80053 COMPREHEN METABOLIC PANEL: CPT

## 2021-07-26 PROCEDURE — 87522 HEPATITIS C REVRS TRNSCRPJ: CPT

## 2021-07-26 PROCEDURE — 36415 COLL VENOUS BLD VENIPUNCTURE: CPT

## 2021-07-27 LAB
ALBUMIN SERPL-MCNC: 4.1 G/DL (ref 3.5–5.2)
ALBUMIN/GLOB SERPL: 1.8 G/DL
ALP SERPL-CCNC: 55 U/L (ref 39–117)
ALT SERPL W P-5'-P-CCNC: 16 U/L (ref 1–33)
ANION GAP SERPL CALCULATED.3IONS-SCNC: 9.9 MMOL/L (ref 5–15)
AST SERPL-CCNC: 19 U/L (ref 1–32)
BILIRUB SERPL-MCNC: 0.4 MG/DL (ref 0–1.2)
BUN SERPL-MCNC: 13 MG/DL (ref 6–20)
BUN/CREAT SERPL: 22 (ref 7–25)
CALCIUM SPEC-SCNC: 8.9 MG/DL (ref 8.6–10.5)
CHLORIDE SERPL-SCNC: 105 MMOL/L (ref 98–107)
CO2 SERPL-SCNC: 22.1 MMOL/L (ref 22–29)
CREAT SERPL-MCNC: 0.59 MG/DL (ref 0.57–1)
GFR SERPL CREATININE-BSD FRML MDRD: 114 ML/MIN/1.73
GLOBULIN UR ELPH-MCNC: 2.3 GM/DL
GLUCOSE SERPL-MCNC: 99 MG/DL (ref 65–99)
POTASSIUM SERPL-SCNC: 4.1 MMOL/L (ref 3.5–5.2)
PROT SERPL-MCNC: 6.4 G/DL (ref 6–8.5)
SODIUM SERPL-SCNC: 137 MMOL/L (ref 136–145)

## 2021-07-28 ENCOUNTER — TRANSCRIBE ORDERS (OUTPATIENT)
Dept: LAB | Facility: HOSPITAL | Age: 36
End: 2021-07-28

## 2021-07-28 ENCOUNTER — LAB (OUTPATIENT)
Dept: LAB | Facility: HOSPITAL | Age: 36
End: 2021-07-28

## 2021-07-28 DIAGNOSIS — Z11.59 SPECIAL SCREENING EXAMINATION FOR UNSPECIFIED VIRAL DISEASE: Primary | ICD-10-CM

## 2021-07-28 DIAGNOSIS — Z11.59 SPECIAL SCREENING EXAMINATION FOR UNSPECIFIED VIRAL DISEASE: ICD-10-CM

## 2021-07-28 LAB — SARS-COV-2 RNA PNL SPEC NAA+PROBE: NOT DETECTED

## 2021-07-28 PROCEDURE — C9803 HOPD COVID-19 SPEC COLLECT: HCPCS

## 2021-07-28 PROCEDURE — 87635 SARS-COV-2 COVID-19 AMP PRB: CPT

## 2021-07-29 LAB
HCV RNA SERPL NAA+PROBE-ACNC: NORMAL IU/ML
TEST INFORMATION: NORMAL

## 2021-08-06 ENCOUNTER — APPOINTMENT (OUTPATIENT)
Dept: PHARMACY | Facility: HOSPITAL | Age: 36
End: 2021-08-06

## 2021-09-21 ENCOUNTER — LAB (OUTPATIENT)
Dept: LAB | Facility: HOSPITAL | Age: 36
End: 2021-09-21

## 2021-09-21 DIAGNOSIS — J02.9 ACUTE PHARYNGITIS, UNSPECIFIED ETIOLOGY: Primary | ICD-10-CM

## 2021-09-21 LAB
B PARAPERT DNA SPEC QL NAA+PROBE: NOT DETECTED
B PERT DNA SPEC QL NAA+PROBE: NOT DETECTED
C PNEUM DNA NPH QL NAA+NON-PROBE: NOT DETECTED
FLUAV SUBTYP SPEC NAA+PROBE: NOT DETECTED
FLUBV RNA ISLT QL NAA+PROBE: NOT DETECTED
HADV DNA SPEC NAA+PROBE: NOT DETECTED
HCOV 229E RNA SPEC QL NAA+PROBE: NOT DETECTED
HCOV HKU1 RNA SPEC QL NAA+PROBE: NOT DETECTED
HCOV NL63 RNA SPEC QL NAA+PROBE: NOT DETECTED
HCOV OC43 RNA SPEC QL NAA+PROBE: NOT DETECTED
HMPV RNA NPH QL NAA+NON-PROBE: NOT DETECTED
HPIV1 RNA SPEC QL NAA+PROBE: NOT DETECTED
HPIV2 RNA SPEC QL NAA+PROBE: NOT DETECTED
HPIV3 RNA NPH QL NAA+PROBE: NOT DETECTED
HPIV4 P GENE NPH QL NAA+PROBE: NOT DETECTED
M PNEUMO IGG SER IA-ACNC: NOT DETECTED
RHINOVIRUS RNA SPEC NAA+PROBE: NOT DETECTED
RSV RNA NPH QL NAA+NON-PROBE: NOT DETECTED
SARS-COV-2 RNA NPH QL NAA+NON-PROBE: NOT DETECTED

## 2021-09-21 PROCEDURE — 0202U NFCT DS 22 TRGT SARS-COV-2: CPT

## 2021-09-22 ENCOUNTER — TELEPHONE (OUTPATIENT)
Dept: URGENT CARE | Facility: CLINIC | Age: 36
End: 2021-09-22

## 2021-09-22 ENCOUNTER — TRANSCRIBE ORDERS (OUTPATIENT)
Dept: LAB | Facility: HOSPITAL | Age: 36
End: 2021-09-22

## 2021-09-22 ENCOUNTER — LAB (OUTPATIENT)
Dept: LAB | Facility: HOSPITAL | Age: 36
End: 2021-09-22

## 2021-09-22 DIAGNOSIS — R53.83 OTHER FATIGUE: ICD-10-CM

## 2021-09-22 DIAGNOSIS — J02.9 ACUTE PHARYNGITIS, UNSPECIFIED ETIOLOGY: ICD-10-CM

## 2021-09-22 DIAGNOSIS — J02.9 ACUTE PHARYNGITIS, UNSPECIFIED ETIOLOGY: Primary | ICD-10-CM

## 2021-09-22 DIAGNOSIS — R05.9 COUGH: Primary | ICD-10-CM

## 2021-09-22 DIAGNOSIS — J02.9 SORE THROAT: ICD-10-CM

## 2021-09-22 DIAGNOSIS — R09.89 CHEST CONGESTION: ICD-10-CM

## 2021-09-22 LAB
ALBUMIN SERPL-MCNC: 4.3 G/DL (ref 3.5–5.2)
ALBUMIN/GLOB SERPL: 1.7 G/DL
ALP SERPL-CCNC: 59 U/L (ref 39–117)
ALT SERPL W P-5'-P-CCNC: 18 U/L (ref 1–33)
ANION GAP SERPL CALCULATED.3IONS-SCNC: 11.9 MMOL/L (ref 5–15)
AST SERPL-CCNC: 17 U/L (ref 1–32)
BASOPHILS # BLD AUTO: 0.07 10*3/MM3 (ref 0–0.2)
BASOPHILS NFR BLD AUTO: 0.8 % (ref 0–1.5)
BILIRUB SERPL-MCNC: 0.4 MG/DL (ref 0–1.2)
BUN SERPL-MCNC: 19 MG/DL (ref 6–20)
BUN/CREAT SERPL: 21.3 (ref 7–25)
CALCIUM SPEC-SCNC: 9.3 MG/DL (ref 8.6–10.5)
CHLORIDE SERPL-SCNC: 101 MMOL/L (ref 98–107)
CO2 SERPL-SCNC: 24.1 MMOL/L (ref 22–29)
CREAT SERPL-MCNC: 0.89 MG/DL (ref 0.57–1)
DEPRECATED RDW RBC AUTO: 41.9 FL (ref 37–54)
EOSINOPHIL # BLD AUTO: 0.27 10*3/MM3 (ref 0–0.4)
EOSINOPHIL NFR BLD AUTO: 3 % (ref 0.3–6.2)
ERYTHROCYTE [DISTWIDTH] IN BLOOD BY AUTOMATED COUNT: 12.7 % (ref 12.3–15.4)
GFR SERPL CREATININE-BSD FRML MDRD: 71 ML/MIN/1.73
GLOBULIN UR ELPH-MCNC: 2.6 GM/DL
GLUCOSE SERPL-MCNC: 98 MG/DL (ref 65–99)
HCT VFR BLD AUTO: 42.6 % (ref 34–46.6)
HGB BLD-MCNC: 15.1 G/DL (ref 12–15.9)
IMM GRANULOCYTES # BLD AUTO: 0.03 10*3/MM3 (ref 0–0.05)
IMM GRANULOCYTES NFR BLD AUTO: 0.3 % (ref 0–0.5)
LYMPHOCYTES # BLD AUTO: 4.65 10*3/MM3 (ref 0.7–3.1)
LYMPHOCYTES NFR BLD AUTO: 50.9 % (ref 19.6–45.3)
MCH RBC QN AUTO: 32.4 PG (ref 26.6–33)
MCHC RBC AUTO-ENTMCNC: 35.4 G/DL (ref 31.5–35.7)
MCV RBC AUTO: 91.4 FL (ref 79–97)
MONOCYTES # BLD AUTO: 0.62 10*3/MM3 (ref 0.1–0.9)
MONOCYTES NFR BLD AUTO: 6.8 % (ref 5–12)
NEUTROPHILS NFR BLD AUTO: 3.5 10*3/MM3 (ref 1.7–7)
NEUTROPHILS NFR BLD AUTO: 38.2 % (ref 42.7–76)
NRBC BLD AUTO-RTO: 0 /100 WBC (ref 0–0.2)
PLATELET # BLD AUTO: 221 10*3/MM3 (ref 140–450)
PMV BLD AUTO: 10.1 FL (ref 6–12)
POTASSIUM SERPL-SCNC: 4.2 MMOL/L (ref 3.5–5.2)
PROT SERPL-MCNC: 6.9 G/DL (ref 6–8.5)
RBC # BLD AUTO: 4.66 10*6/MM3 (ref 3.77–5.28)
SODIUM SERPL-SCNC: 137 MMOL/L (ref 136–145)
WBC # BLD AUTO: 9.14 10*3/MM3 (ref 3.4–10.8)

## 2021-09-22 PROCEDURE — 80053 COMPREHEN METABOLIC PANEL: CPT | Performed by: FAMILY MEDICINE

## 2021-09-22 PROCEDURE — 86664 EPSTEIN-BARR NUCLEAR ANTIGEN: CPT | Performed by: NURSE PRACTITIONER

## 2021-09-22 PROCEDURE — 86663 EPSTEIN-BARR ANTIBODY: CPT | Performed by: NURSE PRACTITIONER

## 2021-09-22 PROCEDURE — 86663 EPSTEIN-BARR ANTIBODY: CPT

## 2021-09-22 PROCEDURE — 86665 EPSTEIN-BARR CAPSID VCA: CPT

## 2021-09-22 PROCEDURE — 36415 COLL VENOUS BLD VENIPUNCTURE: CPT | Performed by: NURSE PRACTITIONER

## 2021-09-22 PROCEDURE — 85025 COMPLETE CBC W/AUTO DIFF WBC: CPT | Performed by: FAMILY MEDICINE

## 2021-09-24 LAB
EBV EA IGG SER-ACNC: 9.8 U/ML (ref 0–8.9)
EBV NA IGG SER IA-ACNC: 47.6 U/ML (ref 0–17.9)
EBV VCA IGG SER IA-ACNC: 117 U/ML (ref 0–17.9)

## 2021-10-13 DIAGNOSIS — K59.04 CHRONIC IDIOPATHIC CONSTIPATION: ICD-10-CM

## 2021-10-13 RX ORDER — PLECANATIDE 3 MG/1
TABLET ORAL
Qty: 30 TABLET | Refills: 2 | Status: SHIPPED | OUTPATIENT
Start: 2021-10-13 | End: 2022-01-24

## 2021-10-18 ENCOUNTER — LAB (OUTPATIENT)
Dept: LAB | Facility: HOSPITAL | Age: 36
End: 2021-10-18

## 2021-10-18 ENCOUNTER — OFFICE VISIT (OUTPATIENT)
Dept: ENDOCRINOLOGY | Facility: CLINIC | Age: 36
End: 2021-10-18

## 2021-10-18 VITALS
OXYGEN SATURATION: 96 % | HEIGHT: 59 IN | BODY MASS INDEX: 38.1 KG/M2 | DIASTOLIC BLOOD PRESSURE: 84 MMHG | WEIGHT: 189 LBS | SYSTOLIC BLOOD PRESSURE: 128 MMHG | HEART RATE: 56 BPM

## 2021-10-18 DIAGNOSIS — Z79.52 CURRENT CHRONIC USE OF SYSTEMIC STEROIDS: ICD-10-CM

## 2021-10-18 DIAGNOSIS — R79.89 ABNORMAL SERUM THYROID STIMULATING HORMONE (TSH) LEVEL: Primary | ICD-10-CM

## 2021-10-18 PROCEDURE — 84445 ASSAY OF TSI GLOBULIN: CPT | Performed by: INTERNAL MEDICINE

## 2021-10-18 PROCEDURE — 84443 ASSAY THYROID STIM HORMONE: CPT | Performed by: INTERNAL MEDICINE

## 2021-10-18 PROCEDURE — 84481 FREE ASSAY (FT-3): CPT | Performed by: INTERNAL MEDICINE

## 2021-10-18 PROCEDURE — 86376 MICROSOMAL ANTIBODY EACH: CPT | Performed by: INTERNAL MEDICINE

## 2021-10-18 PROCEDURE — 99204 OFFICE O/P NEW MOD 45 MIN: CPT | Performed by: INTERNAL MEDICINE

## 2021-10-18 PROCEDURE — 84439 ASSAY OF FREE THYROXINE: CPT | Performed by: INTERNAL MEDICINE

## 2021-10-18 NOTE — PROGRESS NOTES
Chief Complaint   Patient presents with   • Thyroid Problem     low TSH        New patient who is being seen in consultation regarding thyroid dysfunction at the request of Rui Palacios, *     HPI   Alicia Atkinson is a 36 y.o. female who presents for evaluation of thyroid dysfunction    Patient presents today for evaluation of thyroid dysfunction which was diagnosed in August 2021 when patient presented with symptoms of fatigue and weight gain.  Patient reports that her symptomatic concerns of been ongoing for approximately 6 months.  She is not currently taking any medication to alter thyroid function.    Patient denies palpiltations, anxiety.  Patient denies changes in bowel habits. Patient reports chronic changes, improved  Patient reports cold intolerance.  Patient reports changes in weight. Patient reports approximately 50 pounds of weight gain in the past 6 months.   Patient reports hair loss.  Patient denies tremor.  Patient reports decrased energy level with variable intensity.    Patient denies history of previous head/neck radiation.  Patient denies history of recent iodine exposure.  Patient denies taking OTC supplements such as biotin.  Patient reports no known history of thyroid dysfunction. Patient reports that her father has thyroidectomy    Pateint follows with Dr. Haddad for RA and lupus.  She is on chronic steroids since approximately age 13.  She is currently taking prednisone 5 mg daily but does report that she is taking higher doses when she was diagnosed with mono several weeks ago.    Past Medical History:   Diagnosis Date   • ADD (attention deficit disorder)    • Anxiety    • Bipolar affective disorder, manic (HCC)    • Body piercing     both ears   • Borderline personality disorder (HCC)    • Depression    • Fibromyalgia syndrome    • Full dentures    • Hepatitis C    • Hypothyroidism    • Lupus (systemic lupus erythematosus) (HCC)    • Migraine headache    • Pancreatitis    • RA  (rheumatoid arthritis) (HCC)    • Seizures (HCC)     withdrawing from opiates/benzos   • Tachycardia    • Tattoos    • White matter abnormality on MRI of brain      Past Surgical History:   Procedure Laterality Date   • COLONOSCOPY N/A 3/1/2021    Procedure: COLONOSCOPY WITH BIOPSY AND POLYPECTOMY;  Surgeon: Rui Palacios MD;  Location: [facility] ENDOSCOPY;  Service: Gastroenterology;  Laterality: N/A;   • CYST REMOVAL  1991    back    • DILATATION AND CURETTAGE     • MOUTH SURGERY      teeth removal   • TONSILLECTOMY AND ADENOIDECTOMY     • TUBAL ABDOMINAL LIGATION        Family History   Problem Relation Age of Onset   • Colon cancer Paternal Grandfather    • Cancer Paternal Grandfather    • Aneurysm Paternal Grandfather         brain   • Dementia Mother    • Stroke Mother    • Diabetes Mother    • COPD Mother    • Migraines Mother    • Alcohol abuse Mother    • Drug abuse Mother    • Rheum arthritis Father    • Lymphoma Father    • COPD Father    • Drug abuse Brother    • Diabetes Paternal Grandmother    • Pancreatitis Brother    • Diabetes Brother    • Drug abuse Brother    • Migraines Daughter    • Constipation Daughter    • Cirrhosis Neg Hx    • Liver cancer Neg Hx       Social History     Socioeconomic History   • Marital status:    Tobacco Use   • Smoking status: Former Smoker     Packs/day: 1.50     Types: Electronic Cigarette, Cigarettes     Start date:      Quit date: 2020     Years since quittin.7   • Smokeless tobacco: Never Used   Vaping Use   • Vaping Use: Every day   • Start date: 2020   • Substances: Nicotine, Flavoring   • Devices: Disposable, Refillable tank   Substance and Sexual Activity   • Alcohol use: Yes     Comment: social   • Drug use: Yes     Types: Heroin     Comment: 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     • Sexual activity: Defer      Allergies   Allergen Reactions   • Iodine Hives   • Latex Hives      Current Outpatient Medications on File Prior to  Visit   Medication Sig Dispense Refill   • acetaminophen (TYLENOL 8 HOUR) 650 MG 8 hr tablet Take 1 tablet by mouth Every 8 (Eight) Hours As Needed for Mild Pain . 15 tablet 0   • baclofen (LIORESAL) 20 MG tablet Take 20 mg by mouth At Night As Needed for Muscle Spasms.     • buprenorphine-naloxone (SUBOXONE) 8-2 MG per SL tablet Place 1.25 tablets under the tongue Daily.     • folic acid (FOLVITE) 1 MG tablet Take 1 mg by mouth Daily.  3   • furosemide (LASIX) 20 MG tablet Take 20 mg by mouth Daily.  0   • gabapentin (NEURONTIN) 800 MG tablet Take 800 mg by mouth 3 (Three) Times a Day As Needed.  3   • Lidocaine Viscous HCl (XYLOCAINE) 2 % solution Swish and spit or gargle 10 mL 4 times daily as needed for throat / mouth pain 200 mL 0   • medroxyPROGESTERone (DEPO-PROVERA) 150 MG/ML injection Inject 150 mg into the appropriate muscle as directed by prescriber Every 3 (Three) Months.     • methotrexate 2.5 MG tablet Take 12.5 mg by mouth 1 (One) Time Per Week. Take 4 pills weekly  3   • multivitamin with minerals (MULTIVITAMIN ADULTS PO) Take 1 tablet by mouth Daily. On A Ketone/Vitamin drink regimen     • omeprazole (priLOSEC) 40 MG capsule Take 40 mg by mouth Daily.  0   • ondansetron ODT (ZOFRAN-ODT) 4 MG disintegrating tablet Place 1 tablet on the tongue Every 8 (Eight) Hours As Needed for Nausea. 42 tablet 1   • predniSONE (DELTASONE) 10 MG tablet Take 20 mg by mouth Daily. Pt states dosage flucuates     • Sennosides (Chocolated Laxative) 15 MG chewable tablet Chew. 4 squares per week     • Trulance 3 MG tablet TAKE ONE TABLET BY MOUTH EVERY DAY 30 tablet 2   • Ubrogepant (UBRELVY PO) Take  by mouth As Needed. Migraines     • Unable to find 1 each 1 (One) Time. Keto-Os, 2 packets daily     • [DISCONTINUED] busPIRone (BUSPAR) 15 MG tablet Take 15 mg by mouth As Needed.     • [DISCONTINUED] DULoxetine (CYMBALTA) 60 MG capsule Take 60 mg by mouth Daily.     • [DISCONTINUED] Glecaprevir-Pibrentasvir (Mavyret)  "100-40 MG tablet Take 3 tablets by mouth Daily. 168 tablet 0   • [DISCONTINUED] L-Tyrosine 500 MG capsule Take 2 capsules by mouth Daily.     • [DISCONTINUED] Loratadine (CLARITIN PO) Take  by mouth As Needed.       No current facility-administered medications on file prior to visit.        Review of Systems   Constitutional: Positive for activity change, appetite change, diaphoresis, fatigue and unexpected weight gain.   HENT: Positive for sinus pressure, trouble swallowing and voice change.    Eyes: Positive for redness and itching.   Respiratory: Negative for cough and shortness of breath.    Cardiovascular: Negative for palpitations and leg swelling.   Gastrointestinal: Negative for constipation and diarrhea.   Endocrine: Positive for cold intolerance. Negative for heat intolerance.   Musculoskeletal: Positive for arthralgias, back pain, joint swelling and neck stiffness.   Skin: Negative for dry skin and rash.   Allergic/Immunologic: Positive for immunocompromised state.   Neurological: Negative for tremors and headache.   Psychiatric/Behavioral: Negative for sleep disturbance. The patient is not nervous/anxious.       Vitals:    10/18/21 1308   BP: 128/84   BP Location: Left arm   Patient Position: Sitting   Cuff Size: Adult   Pulse: 56   SpO2: 96%   Weight: 85.7 kg (189 lb)   Height: 149.9 cm (59\")   Body mass index is 38.17 kg/m².     Physical Exam  Vitals reviewed.   Constitutional:       General: She is not in acute distress.     Appearance: Normal appearance.   HENT:      Head: Normocephalic and atraumatic.      Right Ear: Hearing normal.      Left Ear: Hearing normal.      Nose: Nose normal.   Eyes:      General: Lids are normal.   Neck:      Thyroid: No thyromegaly or thyroid tenderness.   Cardiovascular:      Rate and Rhythm: Normal rate and regular rhythm.      Heart sounds: No murmur heard.      Pulmonary:      Effort: Pulmonary effort is normal.      Breath sounds: Normal breath sounds and air " entry.   Abdominal:      General: Bowel sounds are normal.      Palpations: Abdomen is soft.      Tenderness: There is no abdominal tenderness.   Lymphadenopathy:      Head:      Right side of head: No submandibular adenopathy.      Left side of head: No submandibular adenopathy.      Cervical: No cervical adenopathy.   Skin:     General: Skin is warm and dry.      Findings: No rash.   Neurological:      General: No focal deficit present.      Mental Status: She is alert.      Deep Tendon Reflexes: Reflexes are normal and symmetric.   Psychiatric:         Mood and Affect: Mood and affect normal.         Behavior: Behavior is cooperative.        Labs/Imaging  Labs dated 8/19/2021  TSH 0.01    Labs dated 4/10/2021  TSH 1.6  Free T4 1.5    Assessment and Plan    Diagnoses and all orders for this visit:    1. Abnormal serum thyroid stimulating hormone (TSH) level (Primary)  -Patient with normal thyroid function testing in April 2021, repeat labs in August 2021 with low TSH  -Patient reports symptoms of fatigue, weight gain.  Discussed the symptoms are typically attributed to hypothyroidism and most recent labs were indicative of hyperthyroidism.  Given this, thyroid dysfunction may not be the explanation for her recent symptomatic concerns.  -Reviewed potential etiologies of patient's laboratory abnormality including thyroiditis, autoimmune disease, overproduction of thyroid hormone from nodule  -Plan to repeat thyroid function testing today along with thyroid antibodies.  CBC and CMP were normal in September 2021.  - Potential risks of untreated hyperthyroidism were discussed with patient.  -The risks and benefits of methimazole and when to seek care were reviewed with the patient. Potential risks include, but are not limited to, hepatic dysfunction, agranulocytosis, rash, vasculitis, iatrogenic hypothyroidism.  - Risks associated with thyroid hormone dysregulation as well as the risks associated with methimazole use  during pregnancy were discussed with the patient.  Patient was counseled to use reliable contraception.  If she were to become pregnant, patient instructed to contact the clinic for further instructions.  -Determine next steps after review of labs.  Also reviewed potential need for thyroid uptake and scan if thyroid antibodies are negative and patient remains hyperthyroid.  -Symptoms of both hypothyroidism and hyperthyroidism were reviewed with patient in detail she was instructed to contact me clinic in the interim between visits with any concerning changes.  -     Thyroid Stimulating Immunoglobulin  -     Thyroid Peroxidase Antibody  -     TSH  -     T4, Free  -     T3, Free    2. Current chronic use of systemic steroids  -Patient reports that she has been taking prednisone consistently since the age of 13.  -Given history of steroid use, discussed that patient likely has iatrogenic adrenal insufficiency and steroid dependence.  -Patient's steroids are managed by her rheumatologist.  Discussed with prednisone 5 mg daily if physiologic dosing and potential dangers of sudden discontinuation of steroids.  Discussed that I recommend ACTH stimulation if the decision was ever made to take her off steroids.  Discussed signs and symptoms of adrenal insufficiency/crisis.  Discussed sick day rules and stress dosing  Management of steroids per rheumatology      Return in about 3 months (around 1/18/2022). The patient was instructed to contact the clinic with any interval questions or concerns.    Rosibel Guardado MD     Please note that portions of this document were completed using a voice recognition program. Efforts were made to edit the dictations, but occasionally words are mis-transcribed.

## 2021-10-19 ENCOUNTER — OFFICE VISIT (OUTPATIENT)
Dept: OBSTETRICS AND GYNECOLOGY | Facility: CLINIC | Age: 36
End: 2021-10-19

## 2021-10-19 VITALS
HEIGHT: 59 IN | SYSTOLIC BLOOD PRESSURE: 114 MMHG | BODY MASS INDEX: 37.34 KG/M2 | DIASTOLIC BLOOD PRESSURE: 82 MMHG | WEIGHT: 185.2 LBS

## 2021-10-19 DIAGNOSIS — Z12.39 ENCOUNTER FOR BREAST CANCER SCREENING OTHER THAN MAMMOGRAM: ICD-10-CM

## 2021-10-19 DIAGNOSIS — Z01.419 ENCOUNTER FOR GYNECOLOGICAL EXAMINATION WITHOUT ABNORMAL FINDING: Primary | ICD-10-CM

## 2021-10-19 DIAGNOSIS — N64.52 BREAST DISCHARGE: ICD-10-CM

## 2021-10-19 DIAGNOSIS — Z30.8 ENCOUNTER FOR OTHER CONTRACEPTIVE MANAGEMENT: ICD-10-CM

## 2021-10-19 DIAGNOSIS — N64.4 BREAST TENDERNESS IN FEMALE: ICD-10-CM

## 2021-10-19 DIAGNOSIS — R68.82 DECREASED LIBIDO: ICD-10-CM

## 2021-10-19 DIAGNOSIS — N95.1 MENOPAUSAL SYMPTOMS: ICD-10-CM

## 2021-10-19 LAB
T3FREE SERPL-MCNC: 3.29 PG/ML (ref 2–4.4)
T4 FREE SERPL-MCNC: 0.92 NG/DL (ref 0.93–1.7)
TSH SERPL DL<=0.05 MIU/L-ACNC: 6.6 UIU/ML (ref 0.27–4.2)

## 2021-10-19 PROCEDURE — 99385 PREV VISIT NEW AGE 18-39: CPT | Performed by: OBSTETRICS & GYNECOLOGY

## 2021-10-19 PROCEDURE — 99214 OFFICE O/P EST MOD 30 MIN: CPT | Performed by: OBSTETRICS & GYNECOLOGY

## 2021-10-20 LAB — THYROPEROXIDASE AB SERPL-ACNC: 21 IU/ML (ref 0–34)

## 2021-10-20 NOTE — PROGRESS NOTES
Chief Complaint  Gynecologic Exam (Annual. Patient c/o hormonal issues (no sexual drive), and would like to discuss PCOS and birth control.)     History of Present Illness:  Patient is 36 y.o.  who presents to [facility] for her annual examination.  Patient gives a history of having her last Pap smear in .  Patient reports that Pap smear was normal.  Patient does have a history of previous abnormal Pap smears.  Patient is currently on Depo-Provera for contraception.  Patient has not had any vaginal bleeding.  Patient does report however having decreased libido.  Patient has been taking over-the-counter supplements with no improvement.  Patient has now been off of them for 1 week.  Patient would like to have her pulmonary levels checked.  Patient received her last Depo-Provera on .  Patient is concerned regarding bone density with the Depo-Provera.  Patient did have a mammogram last year.  Patient reports having breast tenderness.  Patient also reports having continuous breast discharge since her last child.  Patient has had a mammogram and breast ultrasound.  Patient is not aware of having any hormone levels checked.    History  Past Medical History:   Diagnosis Date   • ADD (attention deficit disorder)    • Anxiety    • Bipolar affective disorder, manic ([facility])    • Body piercing     both ears   • Borderline personality disorder ([facility])    • Chlamydia    • Depression    • Ectopic pregnancy    • Fibroid    • Fibromyalgia syndrome    • Full dentures    • Hepatitis B    • Hepatitis C    • Hypothyroidism    • Lupus (systemic lupus erythematosus) ([facility])    • Migraine headache    • Osteoarthritis    • Pancreatitis    • PMS (premenstrual syndrome)    • Preeclampsia    • RA (rheumatoid arthritis) ([facility])    • Recurrent pregnancy loss, antepartum condition or complication    • Seizures ([facility])     withdrawing from opiates/benzos   • Tachycardia    • Tattoos    • Trauma    • Varicella     • White matter abnormality on MRI of brain      Current Outpatient Medications on File Prior to Visit   Medication Sig Dispense Refill   • acetaminophen (TYLENOL 8 HOUR) 650 MG 8 hr tablet Take 1 tablet by mouth Every 8 (Eight) Hours As Needed for Mild Pain . 15 tablet 0   • baclofen (LIORESAL) 20 MG tablet Take 20 mg by mouth At Night As Needed for Muscle Spasms.     • buprenorphine-naloxone (SUBOXONE) 8-2 MG per SL tablet Place 1.25 tablets under the tongue Daily.     • folic acid (FOLVITE) 1 MG tablet Take 1 mg by mouth Daily.  3   • furosemide (LASIX) 20 MG tablet Take 20 mg by mouth Daily.  0   • gabapentin (NEURONTIN) 800 MG tablet Take 800 mg by mouth 3 (Three) Times a Day As Needed.  3   • medroxyPROGESTERone (DEPO-PROVERA) 150 MG/ML injection Inject 150 mg into the appropriate muscle as directed by prescriber Every 3 (Three) Months.     • methotrexate 2.5 MG tablet Take 12.5 mg by mouth 1 (One) Time Per Week. Take 4 pills weekly  3   • multivitamin with minerals (MULTIVITAMIN ADULTS PO) Take 1 tablet by mouth Daily. On A Ketone/Vitamin drink regimen     • omeprazole (priLOSEC) 40 MG capsule Take 40 mg by mouth Daily.  0   • ondansetron ODT (ZOFRAN-ODT) 4 MG disintegrating tablet Place 1 tablet on the tongue Every 8 (Eight) Hours As Needed for Nausea. 42 tablet 1   • predniSONE (DELTASONE) 10 MG tablet Take 20 mg by mouth Daily. Pt states dosage flucuates     • Sennosides (Chocolated Laxative) 15 MG chewable tablet Chew. 4 squares per week     • Trulance 3 MG tablet TAKE ONE TABLET BY MOUTH EVERY DAY 30 tablet 2   • Ubrogepant (UBRELVY PO) Take  by mouth As Needed. Migraines     • Lidocaine Viscous HCl (XYLOCAINE) 2 % solution Swish and spit or gargle 10 mL 4 times daily as needed for throat / mouth pain 200 mL 0   • Unable to find 1 each 1 (One) Time. Keto-Os, 2 packets daily     • [DISCONTINUED] DULoxetine (CYMBALTA) 60 MG capsule Take 60 mg by mouth Daily.     • [DISCONTINUED] Loratadine (CLARITIN PO)  Take  by mouth As Needed.       No current facility-administered medications on file prior to visit.     Allergies   Allergen Reactions   • Iodine Hives   • Latex Hives     Past Surgical History:   Procedure Laterality Date   • COLONOSCOPY N/A 3/1/2021    Procedure: COLONOSCOPY WITH BIOPSY AND POLYPECTOMY;  Surgeon: Rui Palacios MD;  Location: [facility] ENDOSCOPY;  Service: Gastroenterology;  Laterality: N/A;   • CYST REMOVAL  1991    back    • DILATATION AND CURETTAGE     • MOUTH SURGERY      teeth removal   • TONSILLECTOMY AND ADENOIDECTOMY     • TUBAL ABDOMINAL LIGATION       Family History   Problem Relation Age of Onset   • Colon cancer Paternal Grandfather    • Cancer Paternal Grandfather    • Aneurysm Paternal Grandfather         brain   • Dementia Mother    • Stroke Mother    • Diabetes Mother    • COPD Mother    • Migraines Mother    • Alcohol abuse Mother    • Drug abuse Mother    • Rheum arthritis Father    • Lymphoma Father    • COPD Father    • Drug abuse Brother    • Diabetes Paternal Grandmother    • Pancreatitis Brother    • Diabetes Brother    • Drug abuse Brother    • Migraines Daughter    • Constipation Daughter    • Cirrhosis Neg Hx    • Liver cancer Neg Hx      Social History     Socioeconomic History   • Marital status:    Tobacco Use   • Smoking status: Former Smoker     Packs/day: 1.50     Types: Electronic Cigarette, Cigarettes     Start date:      Quit date:      Years since quittin.8   • Smokeless tobacco: Never Used   Vaping Use   • Vaping Use: Every day   • Start date: 2020   • Substances: Nicotine, Flavoring   • Devices: Disposable, Refillable tank   Substance and Sexual Activity   • Alcohol use: Yes     Comment: social   • Drug use: Yes     Types: Heroin     Comment: 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     • Sexual activity: Yes     Partners: Male     Birth control/protection: Injection       Physical Examination:  Vital Signs: /82   Ht  "149.9 cm (59\")   Wt 84 kg (185 lb 3.2 oz)   BMI 37.41 kg/m²     General Appearance: alert, appears stated age, and cooperative  Breasts: Examined in supine position  Symmetric without masses or skin dimpling  Nipples normal without inversion, lesions or discharge  There are no palpable axillary nodes  Fibrocystic changes are present both breasts without a discrete mass  Abdomen: no masses, no hepatomegaly, no splenomegaly, soft non-tender, no guarding and no rebound tenderness  Pelvic: Clinical staff was present for exam  External genitalia:  normal appearance of the external genitalia including Bartholin's and Godfrey's glands.  :  urethral meatus normal;  Vaginal:  normal pink mucosa without prolapse or lesions.  Cervix:  normal appearance.  Uterus:  normal size, shape and consistency.  Adnexa:  non palpable bilaterally.  Pap smear done and specimen sent using Thin-Prep technique    Data Review:  The following data was reviewed by: Rhianna Brambila MD on 10/19/2021:     Labs:    Imaging:  Mammo Diagnostic Digital Tomosynthesis Bilateral With CAD (11/18/2020 09:36)  US Breast Right Limited (11/18/2020 15:01)    Medical Records:  None    Assessment and Plan   Problem List Items Addressed This Visit     None      Visit Diagnoses     Encounter for gynecological examination without abnormal finding    -  Primary  Pap was done today.  If she does not receive the results of the Pap within 2 weeks  time, she was instructed to call to find out the results.  I explained to Alicia that the recommendations for Pap smear interval in a low risk patient has lengthened to 3 years time if cytology alone normal or  5 years time if both cytology and HPV testing were normal.  I encouraged her to be seen yearly for a full physical exam including breast and pelvic exam even during the off years when PAP's will not be performed.    Relevant Orders    Pap IG, Rfx HPV ASCU    Decreased libido      Patient with decreased libido as noted.  " Will obtain hormone levels as planned.  Instructions and precautions given.  Patient is to call for the results.    Relevant Orders    Estradiol    Follicle Stimulating Hormone    Testosterone, Free, Total    Encounter for breast cancer screening other than mammogram      Alicia was counseled regarding having clinical breast exams and breast self-awareness.  Women aged 29-39 years of age should have clinical breast exams every 1-3 years and yearly aged 40 and older.  The patient was counseled regarding breast self-awareness focusing on having a sense of what is normal for her breasts so that she can tell if there are changes.  Even small changes should be reported to provider.    Breast discharge      Patient with complaints of breast discharges noted.  Will obtain prolactin level.  We will schedule mammogram as well as breast ultrasound.  Plan pending results.    Relevant Orders    Prolactin    US Breast Bilateral Complete    Mammo Diagnostic Digital Tomosynthesis Bilateral With CAD    Breast tenderness in female      I reviewed with Alicia that caffeine reduction may help reduce breast pains associated with fibrocystic change.  Furthermore I explained that vitamin E  400 units twice daily may help to lessen the associated breast pains.  If she notices a discretely palpable lump or bloody nipple discharge associated with her breast pain, we need to be notified so that further testing can be scheduled.  We will schedule breast ultrasound and mammogram as noted.  Plan pending results.    Relevant Orders    US Breast Bilateral Complete    Mammo Diagnostic Digital Tomosynthesis Bilateral With CAD    Menopausal symptoms      We will check hormone levels as discussed.  Plan pending results.    Relevant Orders    Estradiol    Follicle Stimulating Hormone    Testosterone, Free, Total    Encounter for other contraceptive management       Discussed the risks, benefits, complications, and other alternative contraception to  DMPA.  Discussed the use of DMPA is associated with bone loss but evidence suggests that the loss appears to be substantially or fully reversible after discontinuation.  The potential health risks associated with the bone loss have to be balanced with the benefits.  Routine monitoring with DEXA is not recommended because the data has not been validated in a young patient population.  Weight bearing exercises and calcium with vitamin D intake have also been reviewed for bone health.  The patient was also informed regarding other alternatives which do not affect bone health.  Pt to continue her DMPA at present as discussed.          Follow Up/Instructions:  Follow up as noted.  Patient was given instructions and counseling regarding her condition or for health maintenance advice. Please see specific information pulled into the AVS if appropriate.     Note: Speech recognition transcription software may have been used to dictate portions of this document.  An attempt at proofreading has been made though minor errors in transcription may still be present.    This note was electronically signed.  Rhianna Brambila M.D.

## 2021-10-21 LAB — TSI SER-ACNC: <0.1 IU/L (ref 0–0.55)

## 2021-10-22 LAB
ESTRADIOL SERPL-MCNC: 12.5 PG/ML
FSH SERPL-ACNC: 4.5 MIU/ML
PROLACTIN SERPL-MCNC: 29 NG/ML (ref 4.8–23.3)
TESTOST FREE SERPL-MCNC: 0.9 PG/ML (ref 0–4.2)
TESTOST SERPL-MCNC: <3 NG/DL (ref 8–60)

## 2021-11-01 DIAGNOSIS — R79.89 ELEVATED PROLACTIN LEVEL: Primary | ICD-10-CM

## 2021-11-04 DIAGNOSIS — Z01.419 ENCOUNTER FOR GYNECOLOGICAL EXAMINATION WITHOUT ABNORMAL FINDING: ICD-10-CM

## 2021-11-09 ENCOUNTER — HOSPITAL ENCOUNTER (OUTPATIENT)
Dept: CT IMAGING | Facility: HOSPITAL | Age: 36
Discharge: HOME | End: 2021-11-09
Admitting: OBSTETRICS & GYNECOLOGY

## 2021-11-09 DIAGNOSIS — R79.89 ELEVATED PROLACTIN LEVEL: ICD-10-CM

## 2021-11-09 PROCEDURE — 70450 CT HEAD/BRAIN W/O DYE: CPT

## 2021-11-11 ENCOUNTER — LAB (OUTPATIENT)
Dept: LAB | Facility: HOSPITAL | Age: 36
End: 2021-11-11

## 2021-11-11 DIAGNOSIS — R79.89 ABNORMAL SERUM THYROID STIMULATING HORMONE (TSH) LEVEL: ICD-10-CM

## 2021-11-11 LAB
T3FREE SERPL-MCNC: 3.56 PG/ML (ref 2–4.4)
T4 FREE SERPL-MCNC: 0.88 NG/DL (ref 0.93–1.7)
TSH SERPL DL<=0.05 MIU/L-ACNC: 4.96 UIU/ML (ref 0.27–4.2)

## 2021-11-11 PROCEDURE — 84439 ASSAY OF FREE THYROXINE: CPT | Performed by: INTERNAL MEDICINE

## 2021-11-11 PROCEDURE — 84443 ASSAY THYROID STIM HORMONE: CPT | Performed by: INTERNAL MEDICINE

## 2021-11-11 PROCEDURE — 84481 FREE ASSAY (FT-3): CPT | Performed by: INTERNAL MEDICINE

## 2021-11-18 ENCOUNTER — TELEPHONE (OUTPATIENT)
Dept: ENDOCRINOLOGY | Facility: CLINIC | Age: 36
End: 2021-11-18

## 2021-11-18 RX ORDER — LEVOTHYROXINE SODIUM 0.03 MG/1
25 TABLET ORAL DAILY
Qty: 90 TABLET | Refills: 1 | Status: SHIPPED | OUTPATIENT
Start: 2021-11-18 | End: 2022-01-07 | Stop reason: SDUPTHER

## 2021-11-26 ENCOUNTER — APPOINTMENT (OUTPATIENT)
Dept: ULTRASOUND IMAGING | Facility: HOSPITAL | Age: 36
End: 2021-11-26

## 2022-01-04 ENCOUNTER — LAB (OUTPATIENT)
Dept: LAB | Facility: HOSPITAL | Age: 37
End: 2022-01-04

## 2022-01-04 ENCOUNTER — OFFICE VISIT (OUTPATIENT)
Dept: ENDOCRINOLOGY | Facility: CLINIC | Age: 37
End: 2022-01-04

## 2022-01-04 VITALS
BODY MASS INDEX: 39.11 KG/M2 | WEIGHT: 194 LBS | HEIGHT: 59 IN | DIASTOLIC BLOOD PRESSURE: 84 MMHG | HEART RATE: 60 BPM | OXYGEN SATURATION: 97 % | SYSTOLIC BLOOD PRESSURE: 136 MMHG

## 2022-01-04 DIAGNOSIS — E03.9 HYPOTHYROIDISM, UNSPECIFIED TYPE: Primary | ICD-10-CM

## 2022-01-04 PROCEDURE — 84439 ASSAY OF FREE THYROXINE: CPT | Performed by: INTERNAL MEDICINE

## 2022-01-04 PROCEDURE — 99213 OFFICE O/P EST LOW 20 MIN: CPT | Performed by: INTERNAL MEDICINE

## 2022-01-04 PROCEDURE — 84443 ASSAY THYROID STIM HORMONE: CPT | Performed by: INTERNAL MEDICINE

## 2022-01-04 NOTE — PROGRESS NOTES
"Chief Complaint   Patient presents with   • Thyroid Problem        ELIZABETH Atkinson is a 36 y.o. female had concerns including Thyroid Problem.      Patient started on low-dose levothyroxine following most recent labs given persistent elevated TSH, decreased free T4.  She denies any significant symptomatic changes since her last visit.  She does report continued concerns regarding weight gain as well as decreased energy.  She denies any side effects of medication or missed doses of medication, she is taking this first thing in the morning on an empty stomach.    The following portions of the patient's history were reviewed and updated as appropriate: allergies, current medications and past social history.    Review of Systems   Constitutional: Positive for fatigue and unexpected weight gain.   Gastrointestinal: Negative for constipation and diarrhea.   Endocrine: Negative for cold intolerance and heat intolerance.      /84 (BP Location: Left arm, Patient Position: Sitting, Cuff Size: Adult)   Pulse 60   Ht 149.9 cm (59\")   Wt 88 kg (194 lb)   SpO2 97%   BMI 39.18 kg/m²      Physical Exam      Constitutional:  well developed; well nourished  no acute distress  appears stated age   ENT/Thyroid: not examined   Eyes: Conjunctiva: clear   Respiratory:  breathing is unlabored  clear to auscultation bilaterally   Cardiovascular:  regular rate and rhythm   Chest:  Not performed.   Abdomen: soft, non-tender; no masses   : Not performed.   Musculoskeletal: Not performed   Skin: dry and warm   Neuro: mental status, speech normal   Psych: mood and affect are within normal limits       Labs/Imaging  Results for PARAS ATKINSON (MRN [id number]) as of 1/4/2022 17:37   Ref. Range 10/18/2021 13:57 10/19/2021 00:00 10/19/2021 14:34 11/9/2021 08:27 11/11/2021 17:13   TSH Baseline Latest Ref Range: 0.270 - 4.200 uIU/mL 6.600 (H)    4.960 (H)   Free T4 Latest Ref Range: 0.93 - 1.70 ng/dL 0.92 (L)    0.88 (L)   T3, Free " Latest Ref Range: 2.00 - 4.40 pg/mL 3.29    3.56   Thyroid Peroxidase Antibody Latest Ref Range: 0 - 34 IU/mL 21       Thyroid Stimulating Immunoglobulin Latest Ref Range: 0.00 - 0.55 IU/L <0.10           Diagnoses and all orders for this visit:    1. Hypothyroidism, unspecified type (Primary)  -Developed following likely episode of thyroiditis this patient initially presented with decreased TSH  -Started on levothyroxine 25 mcg daily in November 2021 given persistent elevation of TSH, decreased free T4   -Patient has continued concerns regarding weight gain, fatigue  -Reviewed potential phases of thyroiditis and the possibility of recovery of thyroid function  -Plan to repeat labs today and adjust levothyroxine as clinically indicated  -Symptoms of both hypothyroidism and hyperthyroidism were reviewed with the patient in detail and she was instructed to contact the clinic in the interim between visits with any concerning changes  -     TSH  -     T4, Free       Return in about 3 months (around 4/4/2022) for Next scheduled follow up. The patient was instructed to contact the clinic with any interval questions or concerns.    Rosibel Guardado MD   Endocrinologist    Dictated Utilizing Dragon Dictation

## 2022-01-05 LAB
T4 FREE SERPL-MCNC: 1.56 NG/DL (ref 0.93–1.7)
TSH SERPL DL<=0.05 MIU/L-ACNC: 2.36 UIU/ML (ref 0.27–4.2)

## 2022-01-07 RX ORDER — LEVOTHYROXINE SODIUM 0.03 MG/1
25 TABLET ORAL DAILY
Qty: 90 TABLET | Refills: 1 | Status: SHIPPED | OUTPATIENT
Start: 2022-01-07 | End: 2022-12-01 | Stop reason: SDUPTHER

## 2022-01-15 ENCOUNTER — HOSPITAL ENCOUNTER (EMERGENCY)
Facility: HOSPITAL | Age: 37
Discharge: HOME | End: 2022-01-16
Attending: FAMILY MEDICINE | Admitting: FAMILY MEDICINE

## 2022-01-15 DIAGNOSIS — G43.809 OTHER MIGRAINE WITHOUT STATUS MIGRAINOSUS, NOT INTRACTABLE: Primary | ICD-10-CM

## 2022-01-15 PROCEDURE — 96361 HYDRATE IV INFUSION ADD-ON: CPT

## 2022-01-15 PROCEDURE — 96374 THER/PROPH/DIAG INJ IV PUSH: CPT

## 2022-01-15 PROCEDURE — 25010000002 BUTORPHANOL PER 1 MG: Performed by: FAMILY MEDICINE

## 2022-01-15 PROCEDURE — 25010000002 DIPHENHYDRAMINE PER 50 MG: Performed by: PHYSICIAN ASSISTANT

## 2022-01-15 PROCEDURE — 96375 TX/PRO/DX INJ NEW DRUG ADDON: CPT

## 2022-01-15 PROCEDURE — 25010000002 KETOROLAC TROMETHAMINE PER 15 MG: Performed by: PHYSICIAN ASSISTANT

## 2022-01-15 PROCEDURE — 25010000002 METOCLOPRAMIDE PER 10 MG: Performed by: PHYSICIAN ASSISTANT

## 2022-01-15 PROCEDURE — 99283 EMERGENCY DEPT VISIT LOW MDM: CPT

## 2022-01-15 RX ORDER — KETOROLAC TROMETHAMINE 30 MG/ML
30 INJECTION, SOLUTION INTRAMUSCULAR; INTRAVENOUS ONCE
Status: COMPLETED | OUTPATIENT
Start: 2022-01-15 | End: 2022-01-15

## 2022-01-15 RX ORDER — DIPHENHYDRAMINE HYDROCHLORIDE 50 MG/ML
25 INJECTION INTRAMUSCULAR; INTRAVENOUS ONCE
Status: COMPLETED | OUTPATIENT
Start: 2022-01-15 | End: 2022-01-15

## 2022-01-15 RX ORDER — METOCLOPRAMIDE HYDROCHLORIDE 5 MG/ML
10 INJECTION INTRAMUSCULAR; INTRAVENOUS ONCE
Status: COMPLETED | OUTPATIENT
Start: 2022-01-15 | End: 2022-01-15

## 2022-01-15 RX ADMIN — DIPHENHYDRAMINE HYDROCHLORIDE 25 MG: 50 INJECTION, SOLUTION INTRAMUSCULAR; INTRAVENOUS at 21:55

## 2022-01-15 RX ADMIN — KETOROLAC TROMETHAMINE 30 MG: 30 INJECTION, SOLUTION INTRAMUSCULAR; INTRAVENOUS at 21:54

## 2022-01-15 RX ADMIN — METOCLOPRAMIDE 10 MG: 5 INJECTION, SOLUTION INTRAMUSCULAR; INTRAVENOUS at 21:54

## 2022-01-15 RX ADMIN — SODIUM CHLORIDE 1000 ML: 9 INJECTION, SOLUTION INTRAVENOUS at 21:54

## 2022-01-15 RX ADMIN — BUTORPHANOL TARTRATE 1 MG: 2 INJECTION, SOLUTION INTRAMUSCULAR; INTRAVENOUS at 23:41

## 2022-01-16 VITALS
HEART RATE: 92 BPM | DIASTOLIC BLOOD PRESSURE: 55 MMHG | HEIGHT: 59 IN | RESPIRATION RATE: 16 BRPM | SYSTOLIC BLOOD PRESSURE: 105 MMHG | BODY MASS INDEX: 38.51 KG/M2 | TEMPERATURE: 97.5 F | WEIGHT: 191 LBS | OXYGEN SATURATION: 96 %

## 2022-01-16 NOTE — ED PROVIDER NOTES
"Subjective   36-year-old female with history of migraines reports to the emergency department complaining of a 48-hour migraine refractory to her normal medications.  She states the pain is in her frontal lobe area, then moved to her subtotal lobe area, and progressed to pain in her tailbone area.  She also experience double vision, is seeing spots, photosensitivity, nausea, and vomiting.  The symptoms are all typical symptoms for her migraines, however the symptoms have not been responsive to medication.  She has taken her maximum dose of Ubelvery and a medication that begins with \"a\" that she does not recall the name as well as Excedrin.  She states her migraines are fairly controlled at a lesser intensity and greater timeframe between them.  She has not had one this bad for quite some time.  She also reports a recent change in dose route of Suboxone to sublingual dosing just prior to the onset of this migraine.      History provided by:  Patient   used: No        Review of Systems   Eyes: Positive for photophobia and visual disturbance (Double vision).   Gastrointestinal: Positive for nausea and vomiting.   Neurological: Positive for headaches.   All other systems reviewed and are negative.      Past Medical History:   Diagnosis Date   • ADD (attention deficit disorder)    • Anxiety    • Bipolar affective disorder, manic ([facility])    • Body piercing     both ears   • Borderline personality disorder ([facility])    • Chlamydia 2003   • Depression    • Ectopic pregnancy    • Fibroid    • Fibromyalgia syndrome    • Full dentures    • Hepatitis B    • Hepatitis C    • Hypothyroidism    • Lupus (systemic lupus erythematosus) ([facility])    • Migraine headache    • Osteoarthritis    • Pancreatitis    • PMS (premenstrual syndrome)    • Preeclampsia    • RA (rheumatoid arthritis) ([facility])    • Recurrent pregnancy loss, antepartum condition or complication    • Seizures ([facility])     withdrawing from opiates/benzos   • " Tachycardia    • Tattoos    • Trauma    • Varicella    • White matter abnormality on MRI of brain        Allergies   Allergen Reactions   • Iodine Hives   • Latex Hives       Past Surgical History:   Procedure Laterality Date   • COLONOSCOPY N/A 3/1/2021    Procedure: COLONOSCOPY WITH BIOPSY AND POLYPECTOMY;  Surgeon: Rui Palacios MD;  Location: [facility] ENDOSCOPY;  Service: Gastroenterology;  Laterality: N/A;   • CYST REMOVAL  1991    back    • DILATATION AND CURETTAGE     • MOUTH SURGERY      teeth removal   • TONSILLECTOMY AND ADENOIDECTOMY     • TUBAL ABDOMINAL LIGATION         Family History   Problem Relation Age of Onset   • Colon cancer Paternal Grandfather    • Cancer Paternal Grandfather    • Aneurysm Paternal Grandfather         brain   • Dementia Mother    • Stroke Mother    • Diabetes Mother    • COPD Mother    • Migraines Mother    • Alcohol abuse Mother    • Drug abuse Mother    • Rheum arthritis Father    • Lymphoma Father    • COPD Father    • Drug abuse Brother    • Diabetes Paternal Grandmother    • Pancreatitis Brother    • Diabetes Brother    • Drug abuse Brother    • Migraines Daughter    • Constipation Daughter    • Cirrhosis Neg Hx    • Liver cancer Neg Hx        Social History     Socioeconomic History   • Marital status:    Tobacco Use   • Smoking status: Former Smoker     Packs/day: 1.50     Types: Electronic Cigarette, Cigarettes     Start date:      Quit date:      Years since quittin.0   • Smokeless tobacco: Never Used   Vaping Use   • Vaping Use: Every day   • Start date: 2020   • Substances: Nicotine, Flavoring   • Devices: Disposable, Refillable tank   Substance and Sexual Activity   • Alcohol use: Yes     Comment: social   • Drug use: Yes     Types: Heroin     Comment: 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     • Sexual activity: Yes     Partners: Male     Birth control/protection: Injection           Objective   Physical Exam  Vitals and nursing  note reviewed.   Constitutional:       General: She is in acute distress.      Appearance: She is well-developed.   Eyes:      Comments: Patient wearing sunglasses due to photophobia   Cardiovascular:      Rate and Rhythm: Normal rate and regular rhythm.      Heart sounds: Normal heart sounds.   Pulmonary:      Effort: Pulmonary effort is normal.      Breath sounds: Normal breath sounds.   Musculoskeletal:         General: Normal range of motion.      Cervical back: Normal range of motion and neck supple.   Skin:     General: Skin is warm and dry.   Neurological:      General: No focal deficit present.      Mental Status: She is alert and oriented to person, place, and time.      Deep Tendon Reflexes: Reflexes are normal and symmetric.   Psychiatric:         Mood and Affect: Mood normal.         Behavior: Behavior normal.         Procedures           ED Course                                                 MDM  Number of Diagnoses or Management Options  Other migraine without status migrainosus, not intractable: new and requires workup  Risk of Complications, Morbidity, and/or Mortality  Presenting problems: minimal  Management options: minimal    Patient Progress  Patient progress: stable      Final diagnoses:   Other migraine without status migrainosus, not intractable       ED Disposition  ED Disposition     ED Disposition Condition Comment    Discharge Stable           [facility]  [address] [place] [address] [phone]  Schedule an appointment as soon as possible for a visit            Medication List      No changes were made to your prescriptions during this visit.          Car Medellin Jr., RAMYA  01/15/22 [id number]

## 2022-01-23 DIAGNOSIS — K59.04 CHRONIC IDIOPATHIC CONSTIPATION: ICD-10-CM

## 2022-01-24 RX ORDER — PLECANATIDE 3 MG/1
1 TABLET ORAL DAILY
Qty: 30 TABLET | Refills: 0 | Status: SHIPPED | OUTPATIENT
Start: 2022-01-24

## 2022-04-18 ENCOUNTER — TRANSCRIBE ORDERS (OUTPATIENT)
Dept: LAB | Facility: HOSPITAL | Age: 37
End: 2022-04-18

## 2022-04-18 DIAGNOSIS — Z51.81 ENCOUNTER FOR THERAPEUTIC DRUG MONITORING: ICD-10-CM

## 2022-04-18 DIAGNOSIS — J02.9 ACUTE PHARYNGITIS, UNSPECIFIED ETIOLOGY: ICD-10-CM

## 2022-04-18 DIAGNOSIS — N91.2 AMENORRHEA: Primary | ICD-10-CM

## 2022-04-18 DIAGNOSIS — R53.83 OTHER FATIGUE: ICD-10-CM

## 2022-04-18 DIAGNOSIS — Z00.00 ROUTINE GENERAL MEDICAL EXAMINATION AT A HEALTH CARE FACILITY: ICD-10-CM

## 2022-04-18 DIAGNOSIS — M05.79 RHEUMATOID ARTHRITIS INVOLVING MULTIPLE SITES WITH POSITIVE RHEUMATOID FACTOR: Primary | ICD-10-CM

## 2022-04-18 DIAGNOSIS — Z86.010 PERSONAL HISTORY OF COLONIC POLYPS: ICD-10-CM

## 2022-04-18 DIAGNOSIS — Z11.59 ENCOUNTER FOR SCREENING FOR OTHER VIRAL DISEASES: ICD-10-CM

## 2022-04-18 DIAGNOSIS — R79.89 ABNORMAL SERUM THYROID STIMULATING HORMONE (TSH) LEVEL: ICD-10-CM

## 2022-04-18 DIAGNOSIS — R68.82 LIBIDO, DECREASED: ICD-10-CM

## 2022-04-18 DIAGNOSIS — B18.2 CHRONIC HEPATITIS C WITHOUT HEPATIC COMA: ICD-10-CM

## 2022-04-18 NOTE — ADDENDUM NOTE
Addended by: PARAS BECK on: 4/18/2022 01:52 PM     Modules accepted: Orders    
complains of pain/discomfort

## 2022-04-19 ENCOUNTER — LAB (OUTPATIENT)
Dept: LAB | Facility: HOSPITAL | Age: 37
End: 2022-04-19

## 2022-04-19 DIAGNOSIS — Z86.010 PERSONAL HISTORY OF COLONIC POLYPS: ICD-10-CM

## 2022-04-19 DIAGNOSIS — R53.83 OTHER FATIGUE: ICD-10-CM

## 2022-04-19 DIAGNOSIS — Z51.81 ENCOUNTER FOR THERAPEUTIC DRUG MONITORING: ICD-10-CM

## 2022-04-19 DIAGNOSIS — Z11.59 ENCOUNTER FOR SCREENING FOR OTHER VIRAL DISEASES: ICD-10-CM

## 2022-04-19 DIAGNOSIS — Z00.00 ROUTINE GENERAL MEDICAL EXAMINATION AT A HEALTH CARE FACILITY: ICD-10-CM

## 2022-04-19 DIAGNOSIS — M05.79 RHEUMATOID ARTHRITIS INVOLVING MULTIPLE SITES WITH POSITIVE RHEUMATOID FACTOR: ICD-10-CM

## 2022-04-19 DIAGNOSIS — R68.82 LIBIDO, DECREASED: ICD-10-CM

## 2022-04-19 DIAGNOSIS — B18.2 CHRONIC HEPATITIS C WITHOUT HEPATIC COMA: ICD-10-CM

## 2022-04-19 DIAGNOSIS — J02.9 ACUTE PHARYNGITIS, UNSPECIFIED ETIOLOGY: ICD-10-CM

## 2022-04-19 DIAGNOSIS — R79.89 ABNORMAL SERUM THYROID STIMULATING HORMONE (TSH) LEVEL: ICD-10-CM

## 2022-04-19 DIAGNOSIS — N91.2 AMENORRHEA: ICD-10-CM

## 2022-04-19 LAB
25(OH)D3 SERPL-MCNC: 65.4 NG/ML (ref 30–100)
ALBUMIN SERPL-MCNC: 4.5 G/DL (ref 3.5–5.2)
ALBUMIN/GLOB SERPL: 1.7 G/DL
ALP SERPL-CCNC: 72 U/L (ref 39–117)
ALT SERPL W P-5'-P-CCNC: 14 U/L (ref 1–33)
ANION GAP SERPL CALCULATED.3IONS-SCNC: 13.9 MMOL/L (ref 5–15)
AST SERPL-CCNC: 14 U/L (ref 1–32)
BASOPHILS # BLD AUTO: 0.04 10*3/MM3 (ref 0–0.2)
BASOPHILS NFR BLD AUTO: 0.7 % (ref 0–1.5)
BILIRUB SERPL-MCNC: 1 MG/DL (ref 0–1.2)
BILIRUB UR QL STRIP: NEGATIVE
BUN SERPL-MCNC: 10 MG/DL (ref 6–20)
BUN/CREAT SERPL: 12.7 (ref 7–25)
CALCIUM SPEC-SCNC: 9.5 MG/DL (ref 8.6–10.5)
CHLORIDE SERPL-SCNC: 104 MMOL/L (ref 98–107)
CHOLEST SERPL-MCNC: 189 MG/DL (ref 0–200)
CHROMATIN AB SERPL-ACNC: 10.5 IU/ML (ref 0–14)
CLARITY UR: CLEAR
CO2 SERPL-SCNC: 23.1 MMOL/L (ref 22–29)
COLOR UR: YELLOW
CORTIS SERPL-MCNC: 17.8 MCG/DL
CREAT SERPL-MCNC: 0.79 MG/DL (ref 0.57–1)
CRP SERPL-MCNC: <0.3 MG/DL (ref 0–0.5)
DEPRECATED RDW RBC AUTO: 41 FL (ref 37–54)
EGFRCR SERPLBLD CKD-EPI 2021: 98.3 ML/MIN/1.73
EOSINOPHIL # BLD AUTO: 0.17 10*3/MM3 (ref 0–0.4)
EOSINOPHIL NFR BLD AUTO: 3.1 % (ref 0.3–6.2)
ERYTHROCYTE [DISTWIDTH] IN BLOOD BY AUTOMATED COUNT: 12 % (ref 12.3–15.4)
ERYTHROCYTE [SEDIMENTATION RATE] IN BLOOD: 10 MM/HR (ref 0–20)
FERRITIN SERPL-MCNC: 88.1 NG/ML (ref 13–150)
FOLATE SERPL-MCNC: >20 NG/ML (ref 4.78–24.2)
GLOBULIN UR ELPH-MCNC: 2.6 GM/DL
GLUCOSE SERPL-MCNC: 98 MG/DL (ref 65–99)
GLUCOSE UR STRIP-MCNC: NEGATIVE MG/DL
HBA1C MFR BLD: 5.1 % (ref 4.8–5.6)
HCT VFR BLD AUTO: 43.3 % (ref 34–46.6)
HCYS SERPL-MCNC: 6.6 UMOL/L (ref 0–15)
HDLC SERPL-MCNC: 59 MG/DL (ref 40–60)
HGB BLD-MCNC: 15.2 G/DL (ref 12–15.9)
HGB UR QL STRIP.AUTO: NEGATIVE
IMM GRANULOCYTES # BLD AUTO: 0.01 10*3/MM3 (ref 0–0.05)
IMM GRANULOCYTES NFR BLD AUTO: 0.2 % (ref 0–0.5)
IRON 24H UR-MRATE: 162 MCG/DL (ref 37–145)
KETONES UR QL STRIP: NEGATIVE
LDLC SERPL CALC-MCNC: 109 MG/DL (ref 0–100)
LDLC/HDLC SERPL: 1.79 {RATIO}
LEUKOCYTE ESTERASE UR QL STRIP.AUTO: NEGATIVE
LYMPHOCYTES # BLD AUTO: 2.23 10*3/MM3 (ref 0.7–3.1)
LYMPHOCYTES NFR BLD AUTO: 40.3 % (ref 19.6–45.3)
MAGNESIUM SERPL-MCNC: 2.1 MG/DL (ref 1.6–2.6)
MCH RBC QN AUTO: 32.8 PG (ref 26.6–33)
MCHC RBC AUTO-ENTMCNC: 35.1 G/DL (ref 31.5–35.7)
MCV RBC AUTO: 93.5 FL (ref 79–97)
MONOCYTES # BLD AUTO: 0.45 10*3/MM3 (ref 0.1–0.9)
MONOCYTES NFR BLD AUTO: 8.1 % (ref 5–12)
NEUTROPHILS NFR BLD AUTO: 2.63 10*3/MM3 (ref 1.7–7)
NEUTROPHILS NFR BLD AUTO: 47.6 % (ref 42.7–76)
NITRITE UR QL STRIP: NEGATIVE
NRBC BLD AUTO-RTO: 0 /100 WBC (ref 0–0.2)
PH UR STRIP.AUTO: 7.5 [PH] (ref 5–8)
PLATELET # BLD AUTO: 272 10*3/MM3 (ref 140–450)
PMV BLD AUTO: 10.2 FL (ref 6–12)
POTASSIUM SERPL-SCNC: 3.7 MMOL/L (ref 3.5–5.2)
PROGEST SERPL-MCNC: 0.17 NG/ML
PROLACTIN SERPL-MCNC: 26.1 NG/ML (ref 4.79–23.3)
PROT SERPL-MCNC: 7.1 G/DL (ref 6–8.5)
PROT UR QL STRIP: NEGATIVE
RBC # BLD AUTO: 4.63 10*6/MM3 (ref 3.77–5.28)
SODIUM SERPL-SCNC: 141 MMOL/L (ref 136–145)
SP GR UR STRIP: 1.01 (ref 1–1.03)
T3 SERPL-MCNC: 123 NG/DL (ref 80–200)
T3FREE SERPL-MCNC: 3.38 PG/ML (ref 2–4.4)
T4 FREE SERPL-MCNC: 1.28 NG/DL (ref 0.93–1.7)
TRIGL SERPL-MCNC: 121 MG/DL (ref 0–150)
TSH SERPL DL<=0.05 MIU/L-ACNC: 2.36 UIU/ML (ref 0.27–4.2)
UROBILINOGEN UR QL STRIP: NORMAL
VIT B12 BLD-MCNC: 851 PG/ML (ref 211–946)
VLDLC SERPL-MCNC: 21 MG/DL (ref 5–40)
WBC NRBC COR # BLD: 5.53 10*3/MM3 (ref 3.4–10.8)

## 2022-04-19 PROCEDURE — 81003 URINALYSIS AUTO W/O SCOPE: CPT

## 2022-04-19 PROCEDURE — 84630 ASSAY OF ZINC: CPT

## 2022-04-19 PROCEDURE — 80050 GENERAL HEALTH PANEL: CPT

## 2022-04-19 PROCEDURE — 84481 FREE ASSAY (FT-3): CPT

## 2022-04-19 PROCEDURE — 86140 C-REACTIVE PROTEIN: CPT

## 2022-04-19 PROCEDURE — 83540 ASSAY OF IRON: CPT

## 2022-04-19 PROCEDURE — 86480 TB TEST CELL IMMUN MEASURE: CPT

## 2022-04-19 PROCEDURE — 82607 VITAMIN B-12: CPT

## 2022-04-19 PROCEDURE — 83520 IMMUNOASSAY QUANT NOS NONAB: CPT

## 2022-04-19 PROCEDURE — 84403 ASSAY OF TOTAL TESTOSTERONE: CPT

## 2022-04-19 PROCEDURE — 83735 ASSAY OF MAGNESIUM: CPT

## 2022-04-19 PROCEDURE — 82728 ASSAY OF FERRITIN: CPT

## 2022-04-19 PROCEDURE — 80061 LIPID PANEL: CPT

## 2022-04-19 PROCEDURE — 82306 VITAMIN D 25 HYDROXY: CPT

## 2022-04-19 PROCEDURE — 84146 ASSAY OF PROLACTIN: CPT

## 2022-04-19 PROCEDURE — 84482 T3 REVERSE: CPT

## 2022-04-19 PROCEDURE — 82533 TOTAL CORTISOL: CPT

## 2022-04-19 PROCEDURE — 86038 ANTINUCLEAR ANTIBODIES: CPT

## 2022-04-19 PROCEDURE — 86769 SARS-COV-2 COVID-19 ANTIBODY: CPT

## 2022-04-19 PROCEDURE — 86803 HEPATITIS C AB TEST: CPT

## 2022-04-19 PROCEDURE — 82627 DEHYDROEPIANDROSTERONE: CPT

## 2022-04-19 PROCEDURE — 86431 RHEUMATOID FACTOR QUANT: CPT

## 2022-04-19 PROCEDURE — 84402 ASSAY OF FREE TESTOSTERONE: CPT

## 2022-04-19 PROCEDURE — 83001 ASSAY OF GONADOTROPIN (FSH): CPT

## 2022-04-19 PROCEDURE — 84480 ASSAY TRIIODOTHYRONINE (T3): CPT

## 2022-04-19 PROCEDURE — 84439 ASSAY OF FREE THYROXINE: CPT

## 2022-04-19 PROCEDURE — 82746 ASSAY OF FOLIC ACID SERUM: CPT

## 2022-04-19 PROCEDURE — 83036 HEMOGLOBIN GLYCOSYLATED A1C: CPT

## 2022-04-19 PROCEDURE — 83002 ASSAY OF GONADOTROPIN (LH): CPT

## 2022-04-19 PROCEDURE — 85652 RBC SED RATE AUTOMATED: CPT

## 2022-04-19 PROCEDURE — 84144 ASSAY OF PROGESTERONE: CPT

## 2022-04-19 PROCEDURE — 86200 CCP ANTIBODY: CPT

## 2022-04-19 PROCEDURE — 86376 MICROSOMAL ANTIBODY EACH: CPT

## 2022-04-19 PROCEDURE — 87522 HEPATITIS C REVRS TRNSCRPJ: CPT

## 2022-04-19 PROCEDURE — 82672 ASSAY OF ESTROGEN: CPT

## 2022-04-19 PROCEDURE — 83090 ASSAY OF HOMOCYSTEINE: CPT

## 2022-04-19 PROCEDURE — 36415 COLL VENOUS BLD VENIPUNCTURE: CPT

## 2022-04-20 LAB
CCP IGA+IGG SERPL IA-ACNC: 4 UNITS (ref 0–19)
DHEA-S SERPL-MCNC: 55.9 UG/DL (ref 57.3–279.2)
FSH SERPL-ACNC: 6.84 MIU/ML
LH SERPL-ACNC: 8.19 MIU/ML
THYROPEROXIDASE AB SERPL-ACNC: 14 IU/ML (ref 0–34)

## 2022-04-21 LAB
ANA SER QL IF: NEGATIVE
ESTROGEN SERPL-MCNC: 80 PG/ML
GAMMA INTERFERON BACKGROUND BLD IA-ACNC: 0.05 IU/ML
M TB IFN-G BLD-IMP: NEGATIVE
M TB IFN-G CD4+ BCKGRND COR BLD-ACNC: 0.06 IU/ML
M TB IFN-G CD4+CD8+ BCKGRND COR BLD-ACNC: 0.05 IU/ML
MITOGEN IGNF BLD-ACNC: >10 IU/ML
QUANTIFERON INCUBATION: NORMAL
SARS-COV-2 AB SERPL QL IA: POSITIVE
SERVICE CMNT-IMP: NORMAL

## 2022-04-22 LAB
DIAGNOSTIC IMP SPEC-IMP: NORMAL
HCV AB S/CO SERPL IA: >11 S/CO RATIO (ref 0–0.9)
HCV RNA SERPL NAA+PROBE-ACNC: NORMAL IU/ML
REF LAB TEST REF RANGE: NORMAL
ZINC SERPL-MCNC: 86 UG/DL (ref 44–115)

## 2022-04-29 LAB
TESTOST FREE SERPL-MCNC: 1.4 PG/ML (ref 0–4.2)
TESTOST SERPL-MCNC: 24.8 NG/DL (ref 10–55)

## 2022-05-02 ENCOUNTER — OFFICE VISIT (OUTPATIENT)
Dept: OBSTETRICS AND GYNECOLOGY | Facility: CLINIC | Age: 37
End: 2022-05-02

## 2022-05-02 VITALS
DIASTOLIC BLOOD PRESSURE: 60 MMHG | BODY MASS INDEX: 37.82 KG/M2 | HEIGHT: 59 IN | SYSTOLIC BLOOD PRESSURE: 94 MMHG | WEIGHT: 187.6 LBS

## 2022-05-02 DIAGNOSIS — N90.7 VULVAR CYST: Primary | ICD-10-CM

## 2022-05-02 LAB — 14-3-3 ETA AG SER IA-MCNC: <0.2 NG/ML

## 2022-05-02 PROCEDURE — 99213 OFFICE O/P EST LOW 20 MIN: CPT | Performed by: PHYSICIAN ASSISTANT

## 2022-05-02 RX ORDER — CEPHALEXIN 500 MG/1
500 CAPSULE ORAL 2 TIMES DAILY
Qty: 14 CAPSULE | Refills: 0 | Status: SHIPPED | OUTPATIENT
Start: 2022-05-02 | End: 2022-05-12

## 2022-05-02 NOTE — PROGRESS NOTES
Subjective   Chief Complaint   Patient presents with   • Follow-up     Patient is here C/O vaginal boil.  It has opened up some today       Alicia Atkinson is a 36 y.o. year old  presenting to be seen for vulvar bump  small bump came up about 2-3 weeks ago right labia after hair removal product used  Has been a little sore and today noted some yellow drainage from it         Past Medical History:   Diagnosis Date   • ADD (attention deficit disorder)    • Anxiety    • Bipolar affective disorder, manic ([facility])    • Body piercing     both ears   • Borderline personality disorder ([facility])    • Chlamydia    • Depression    • Ectopic pregnancy    • Fibroid    • Fibromyalgia syndrome    • Full dentures    • Hepatitis B    • Hepatitis C    • Hypothyroidism    • Lupus (systemic lupus erythematosus) ([facility])    • Migraine headache    • Osteoarthritis    • Pancreatitis    • PMS (premenstrual syndrome)    • Preeclampsia    • RA (rheumatoid arthritis) ([facility])    • Recurrent pregnancy loss, antepartum condition or complication    • Seizures ([facility])     withdrawing from opiates/benzos   • Tachycardia    • Tattoos    • Trauma    • Varicella    • White matter abnormality on MRI of brain         Current Outpatient Medications:   •  acetaminophen (TYLENOL 8 HOUR) 650 MG 8 hr tablet, Take 1 tablet by mouth Every 8 (Eight) Hours As Needed for Mild Pain ., Disp: 15 tablet, Rfl: 0  •  baclofen (LIORESAL) 20 MG tablet, Take 20 mg by mouth At Night As Needed for Muscle Spasms., Disp: , Rfl:   •  buprenorphine-naloxone (SUBOXONE) 8-2 MG per SL tablet, Place 1.25 tablets under the tongue Daily., Disp: , Rfl:   •  folic acid (FOLVITE) 1 MG tablet, Take 1 mg by mouth Daily., Disp: , Rfl: 3  •  furosemide (LASIX) 20 MG tablet, Take 20 mg by mouth Daily., Disp: , Rfl: 0  •  gabapentin (NEURONTIN) 800 MG tablet, Take 800 mg by mouth 3 (Three) Times a Day As Needed., Disp: , Rfl: 3  •  levothyroxine (Synthroid) 25 MCG tablet, Take 1 tablet by mouth  Daily., Disp: 90 tablet, Rfl: 1  •  methotrexate 2.5 MG tablet, Take 12.5 mg by mouth 1 (One) Time Per Week. Take 4 pills weekly, Disp: , Rfl: 3  •  multivitamin with minerals tablet tablet, Take 1 tablet by mouth Daily. On A Ketone/Vitamin drink regimen, Disp: , Rfl:   •  omeprazole (priLOSEC) 40 MG capsule, Take 40 mg by mouth Daily., Disp: , Rfl: 0  •  ondansetron ODT (ZOFRAN-ODT) 4 MG disintegrating tablet, Place 1 tablet on the tongue Every 8 (Eight) Hours As Needed for Nausea., Disp: 42 tablet, Rfl: 1  •  Plecanatide (Trulance) 3 MG tablet, Take 1 tablet by mouth Daily. Needs office visit for further refills., Disp: 30 tablet, Rfl: 0  •  predniSONE (DELTASONE) 10 MG tablet, Take 20 mg by mouth Daily. Pt states dosage flucuates, Disp: , Rfl:   •  Sennosides (Chocolated Laxative) 15 MG chewable tablet, Chew. 4 squares per week, Disp: , Rfl:   •  Ubrogepant (UBRELVY PO), Take  by mouth As Needed. Migraines, Disp: , Rfl:   •  cephalexin (Keflex) 500 MG capsule, Take 1 capsule by mouth 2 (Two) Times a Day for 10 days., Disp: 14 capsule, Rfl: 0   Allergies   Allergen Reactions   • Iodine Hives   • Latex Hives      Past Surgical History:   Procedure Laterality Date   • COLONOSCOPY N/A 3/1/2021    Procedure: COLONOSCOPY WITH BIOPSY AND POLYPECTOMY;  Surgeon: Rui Palacios MD;  Location: [facility] ENDOSCOPY;  Service: Gastroenterology;  Laterality: N/A;   • CYST REMOVAL  1991    back    • DILATATION AND CURETTAGE     • MOUTH SURGERY      teeth removal   • TONSILLECTOMY AND ADENOIDECTOMY     • TUBAL ABDOMINAL LIGATION        Social History     Socioeconomic History   • Marital status:    Tobacco Use   • Smoking status: Former Smoker     Packs/day: 1.50     Types: Electronic Cigarette, Cigarettes     Start date:      Quit date:      Years since quittin.3   • Smokeless tobacco: Never Used   Vaping Use   • Vaping Use: Every day   • Start date: 2020   • Substances: Nicotine, Flavoring  "  • Devices: Disposable, Refillable tank   Substance and Sexual Activity   • Alcohol use: Yes     Comment: social   • Drug use: Yes     Types: Heroin     Comment: 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     • Sexual activity: Yes     Partners: Male     Birth control/protection: None      Family History   Problem Relation Age of Onset   • Colon cancer Paternal Grandfather    • Cancer Paternal Grandfather    • Aneurysm Paternal Grandfather         brain   • Dementia Mother    • Stroke Mother    • Diabetes Mother    • COPD Mother    • Migraines Mother    • Alcohol abuse Mother    • Drug abuse Mother    • Rheum arthritis Father    • Lymphoma Father    • COPD Father    • Drug abuse Brother    • Diabetes Paternal Grandmother    • Pancreatitis Brother    • Diabetes Brother    • Drug abuse Brother    • Migraines Daughter    • Constipation Daughter    • Cirrhosis Neg Hx    • Liver cancer Neg Hx        Review of Systems   Constitutional: Negative for chills, diaphoresis and fever.   Gastrointestinal: Negative.    Genitourinary: Negative for difficulty urinating, dysuria, pelvic pain and vaginal discharge.           Objective   BP 94/60   Ht 149.9 cm (59\")   Wt 85.1 kg (187 lb 9.6 oz)   Breastfeeding No   BMI 37.89 kg/m²     Physical Exam  Constitutional:       Appearance: Normal appearance. She is well-developed and well-groomed.   Eyes:      General: Lids are normal.      Extraocular Movements: Extraocular movements intact.      Conjunctiva/sclera: Conjunctivae normal.   Genitourinary:     Labia:         Right: Lesion present. No injury.         Left: No tenderness, lesion or injury.           Comments: Small pea size cyst under skin right upper labia majora. Mildly red but no drainage observed at present  Neurological:      Mental Status: She is alert.   Psychiatric:         Attention and Perception: Attention normal.         Mood and Affect: Mood normal.         Speech: Speech normal.         Behavior: Behavior is " cooperative.            Result Review :                   Assessment and Plan  Diagnoses and all orders for this visit:    1. Vulvar cyst (Primary)    Other orders  -     cephalexin (Keflex) 500 MG capsule; Take 1 capsule by mouth 2 (Two) Times a Day for 10 days.  Dispense: 14 capsule; Refill: 0      Patient Instructions   Warm compresses to area 2-3 times daily  Loose clothing  RTO prn             This note was electronically signed.    Tammy Malcolm PA-C   May 2, 2022

## 2022-05-03 LAB — T3REVERSE SERPL-MCNC: 13.7 NG/DL (ref 9.2–24.1)

## 2022-07-14 ENCOUNTER — LAB REQUISITION (OUTPATIENT)
Dept: LAB | Facility: HOSPITAL | Age: 37
End: 2022-07-14

## 2022-07-14 DIAGNOSIS — Z51.81 ENCOUNTER FOR THERAPEUTIC DRUG LEVEL MONITORING: ICD-10-CM

## 2022-07-14 DIAGNOSIS — M05.79 RHEUMATOID ARTHRITIS WITH RHEUMATOID FACTOR OF MULTIPLE SITES WITHOUT ORGAN OR SYSTEMS INVOLVEMENT: ICD-10-CM

## 2022-07-14 LAB
ALBUMIN SERPL-MCNC: 4.3 G/DL (ref 3.5–5.2)
ALBUMIN/GLOB SERPL: 1.7 G/DL
ALP SERPL-CCNC: 75 U/L (ref 39–117)
ALT SERPL W P-5'-P-CCNC: 11 U/L (ref 1–33)
ANION GAP SERPL CALCULATED.3IONS-SCNC: 11.1 MMOL/L (ref 5–15)
AST SERPL-CCNC: 16 U/L (ref 1–32)
BASOPHILS # BLD AUTO: 0.05 10*3/MM3 (ref 0–0.2)
BASOPHILS NFR BLD AUTO: 0.6 % (ref 0–1.5)
BILIRUB SERPL-MCNC: 0.4 MG/DL (ref 0–1.2)
BUN SERPL-MCNC: 16 MG/DL (ref 6–20)
BUN/CREAT SERPL: 21.9 (ref 7–25)
CALCIUM SPEC-SCNC: 9.3 MG/DL (ref 8.6–10.5)
CHLORIDE SERPL-SCNC: 103 MMOL/L (ref 98–107)
CO2 SERPL-SCNC: 26.9 MMOL/L (ref 22–29)
CREAT SERPL-MCNC: 0.73 MG/DL (ref 0.57–1)
CRP SERPL-MCNC: 0.45 MG/DL (ref 0–0.5)
DEPRECATED RDW RBC AUTO: 43.8 FL (ref 37–54)
EGFRCR SERPLBLD CKD-EPI 2021: 108.1 ML/MIN/1.73
EOSINOPHIL # BLD AUTO: 0.3 10*3/MM3 (ref 0–0.4)
EOSINOPHIL NFR BLD AUTO: 3.7 % (ref 0.3–6.2)
ERYTHROCYTE [DISTWIDTH] IN BLOOD BY AUTOMATED COUNT: 12.6 % (ref 12.3–15.4)
ERYTHROCYTE [SEDIMENTATION RATE] IN BLOOD: 2 MM/HR (ref 0–20)
GLOBULIN UR ELPH-MCNC: 2.6 GM/DL
GLUCOSE SERPL-MCNC: 97 MG/DL (ref 65–99)
HCT VFR BLD AUTO: 38.3 % (ref 34–46.6)
HGB BLD-MCNC: 13.8 G/DL (ref 12–15.9)
IMM GRANULOCYTES # BLD AUTO: 0.02 10*3/MM3 (ref 0–0.05)
IMM GRANULOCYTES NFR BLD AUTO: 0.2 % (ref 0–0.5)
LYMPHOCYTES # BLD AUTO: 3.09 10*3/MM3 (ref 0.7–3.1)
LYMPHOCYTES NFR BLD AUTO: 38.4 % (ref 19.6–45.3)
MCH RBC QN AUTO: 34.1 PG (ref 26.6–33)
MCHC RBC AUTO-ENTMCNC: 36 G/DL (ref 31.5–35.7)
MCV RBC AUTO: 94.6 FL (ref 79–97)
MONOCYTES # BLD AUTO: 0.45 10*3/MM3 (ref 0.1–0.9)
MONOCYTES NFR BLD AUTO: 5.6 % (ref 5–12)
NEUTROPHILS NFR BLD AUTO: 4.14 10*3/MM3 (ref 1.7–7)
NEUTROPHILS NFR BLD AUTO: 51.5 % (ref 42.7–76)
NRBC BLD AUTO-RTO: 0 /100 WBC (ref 0–0.2)
PLATELET # BLD AUTO: 253 10*3/MM3 (ref 140–450)
PMV BLD AUTO: 9.8 FL (ref 6–12)
POTASSIUM SERPL-SCNC: 4 MMOL/L (ref 3.5–5.2)
PROT SERPL-MCNC: 6.9 G/DL (ref 6–8.5)
RBC # BLD AUTO: 4.05 10*6/MM3 (ref 3.77–5.28)
SODIUM SERPL-SCNC: 141 MMOL/L (ref 136–145)
WBC NRBC COR # BLD: 8.05 10*3/MM3 (ref 3.4–10.8)

## 2022-07-14 PROCEDURE — 86140 C-REACTIVE PROTEIN: CPT | Performed by: INTERNAL MEDICINE

## 2022-07-14 PROCEDURE — 80053 COMPREHEN METABOLIC PANEL: CPT | Performed by: INTERNAL MEDICINE

## 2022-07-14 PROCEDURE — 85652 RBC SED RATE AUTOMATED: CPT | Performed by: INTERNAL MEDICINE

## 2022-07-14 PROCEDURE — 85025 COMPLETE CBC W/AUTO DIFF WBC: CPT | Performed by: INTERNAL MEDICINE

## 2022-09-09 DIAGNOSIS — R11.0 NAUSEA: ICD-10-CM

## 2022-09-09 RX ORDER — ONDANSETRON 4 MG/1
TABLET, ORALLY DISINTEGRATING ORAL
Qty: 42 TABLET | Refills: 1 | OUTPATIENT
Start: 2022-09-09 | End: 2023-03-24

## 2022-09-30 ENCOUNTER — LAB REQUISITION (OUTPATIENT)
Dept: LAB | Facility: HOSPITAL | Age: 37
End: 2022-09-30

## 2022-09-30 DIAGNOSIS — L70.9 ACNE, UNSPECIFIED: ICD-10-CM

## 2022-09-30 LAB
ALBUMIN SERPL-MCNC: 4.1 G/DL (ref 3.5–5.2)
ALBUMIN/GLOB SERPL: 1.4 G/DL
ALP SERPL-CCNC: 76 U/L (ref 39–117)
ALT SERPL W P-5'-P-CCNC: 13 U/L (ref 1–33)
ANION GAP SERPL CALCULATED.3IONS-SCNC: 11.7 MMOL/L (ref 5–15)
AST SERPL-CCNC: 13 U/L (ref 1–32)
BILIRUB SERPL-MCNC: 0.3 MG/DL (ref 0–1.2)
BUN SERPL-MCNC: 15 MG/DL (ref 6–20)
BUN/CREAT SERPL: 22.4 (ref 7–25)
CALCIUM SPEC-SCNC: 9.4 MG/DL (ref 8.6–10.5)
CHLORIDE SERPL-SCNC: 100 MMOL/L (ref 98–107)
CHOLEST SERPL-MCNC: 187 MG/DL (ref 0–200)
CO2 SERPL-SCNC: 25.3 MMOL/L (ref 22–29)
CREAT SERPL-MCNC: 0.67 MG/DL (ref 0.57–1)
EGFRCR SERPLBLD CKD-EPI 2021: 114.2 ML/MIN/1.73
GGT SERPL-CCNC: 12 U/L (ref 5–36)
GLOBULIN UR ELPH-MCNC: 3 GM/DL
GLUCOSE SERPL-MCNC: 86 MG/DL (ref 65–99)
HCG SERPL QL: NEGATIVE
POTASSIUM SERPL-SCNC: 4.4 MMOL/L (ref 3.5–5.2)
PROT SERPL-MCNC: 7.1 G/DL (ref 6–8.5)
SODIUM SERPL-SCNC: 137 MMOL/L (ref 136–145)
TRIGL SERPL-MCNC: 102 MG/DL (ref 0–150)

## 2022-09-30 PROCEDURE — 82977 ASSAY OF GGT: CPT | Performed by: SPECIALIST

## 2022-09-30 PROCEDURE — 80053 COMPREHEN METABOLIC PANEL: CPT | Performed by: SPECIALIST

## 2022-09-30 PROCEDURE — 84478 ASSAY OF TRIGLYCERIDES: CPT | Performed by: SPECIALIST

## 2022-09-30 PROCEDURE — 84703 CHORIONIC GONADOTROPIN ASSAY: CPT | Performed by: SPECIALIST

## 2022-09-30 PROCEDURE — 82465 ASSAY BLD/SERUM CHOLESTEROL: CPT | Performed by: SPECIALIST

## 2022-10-11 ENCOUNTER — OFFICE VISIT (OUTPATIENT)
Dept: OBSTETRICS AND GYNECOLOGY | Facility: CLINIC | Age: 37
End: 2022-10-11

## 2022-10-11 VITALS
DIASTOLIC BLOOD PRESSURE: 68 MMHG | WEIGHT: 190 LBS | HEIGHT: 59 IN | BODY MASS INDEX: 38.3 KG/M2 | SYSTOLIC BLOOD PRESSURE: 108 MMHG

## 2022-10-11 DIAGNOSIS — R10.2 PELVIC CRAMPING: ICD-10-CM

## 2022-10-11 DIAGNOSIS — R68.82 DECREASED LIBIDO: ICD-10-CM

## 2022-10-11 DIAGNOSIS — R79.89 ELEVATED PROLACTIN LEVEL: ICD-10-CM

## 2022-10-11 DIAGNOSIS — N91.2 AMENORRHEA: Primary | ICD-10-CM

## 2022-10-11 PROCEDURE — 99214 OFFICE O/P EST MOD 30 MIN: CPT | Performed by: PHYSICIAN ASSISTANT

## 2022-10-11 RX ORDER — LORATADINE 10 MG/1
10 TABLET ORAL
COMMUNITY
Start: 2022-08-19 | End: 2023-03-24

## 2022-10-11 RX ORDER — ETANERCEPT 50 MG/ML
SOLUTION SUBCUTANEOUS
COMMUNITY
Start: 2022-09-14

## 2022-10-11 NOTE — PATIENT INSTRUCTIONS
We will check labs and patient is advised to have labs drawn fasting around 8 AM for accurate prolactin level.  Follow-up in about 1 week for pelvic ultrasound.  Further evaluation/management pending results.

## 2022-10-11 NOTE — PROGRESS NOTES
Subjective   Chief Complaint   Patient presents with   • Menstrual Problem     Patient hasn't had a period since December.  Has been having spotting and cramping x one week.  HCG done-Negative        Alicia Atkinson is a 37 y.o. year old  presenting to be seen for amenorrhea.  Patient reports that she had not had a menstrual cycle since 2021 until last week she began having some spotting and cramping.  This has not been a normal menstrual flow but has been spotting off and on.  She is experiencing some cramping in the left lower quadrant and also the midline.  When she has the cramping she passes small clots.  She had been on Depo-Provera for about 3 years but has stopped Depo-Provera 2 years ago.  After stopping the Depo-Provera she went almost 1 year until she had the menstrual cycle in 2021.  She has had a tubal ligation  She had multiple labs checked in April of this year with menopause labs not being close to menopause range.  Her prolactin level was slightly elevated.  Thyroid screening was normal.  Recent hCG 2022 was negative  Reports issues with low libido for quite some time    Past Medical History:   Diagnosis Date   • ADD (attention deficit disorder)    • Anxiety    • Bipolar affective disorder, manic ([facility])    • Body piercing     both ears   • Borderline personality disorder (HCC)    • Chlamydia    • Depression    • Ectopic pregnancy    • Fibroid    • Fibromyalgia syndrome    • Full dentures    • Hepatitis B    • Hepatitis C    • Hypothyroidism    • Lupus (systemic lupus erythematosus) (HCC)    • Migraine headache    • Osteoarthritis    • Pancreatitis    • PMS (premenstrual syndrome)    • Preeclampsia    • RA (rheumatoid arthritis) ([facility])    • Recurrent pregnancy loss, antepartum condition or complication    • Seizures ([facility]CC)     withdrawing from opiates/benzos   • Tachycardia    • Tattoos    • Trauma    • Varicella    • White matter abnormality on MRI of brain          Current Outpatient Medications:   •  acetaminophen (TYLENOL 8 HOUR) 650 MG 8 hr tablet, Take 1 tablet by mouth Every 8 (Eight) Hours As Needed for Mild Pain ., Disp: 15 tablet, Rfl: 0  •  baclofen (LIORESAL) 20 MG tablet, Take 20 mg by mouth At Night As Needed for Muscle Spasms., Disp: , Rfl:   •  buprenorphine-naloxone (SUBOXONE) 8-2 MG per SL tablet, Place 1.25 tablets under the tongue Daily., Disp: , Rfl:   •  Enbrel Mini 50 MG/ML solution cartridge, , Disp: , Rfl:   •  folic acid (FOLVITE) 1 MG tablet, Take 1 mg by mouth Daily., Disp: , Rfl: 3  •  furosemide (LASIX) 20 MG tablet, Take 20 mg by mouth Daily., Disp: , Rfl: 0  •  gabapentin (NEURONTIN) 800 MG tablet, Take 800 mg by mouth 3 (Three) Times a Day As Needed., Disp: , Rfl: 3  •  levothyroxine (Synthroid) 25 MCG tablet, Take 1 tablet by mouth Daily., Disp: 90 tablet, Rfl: 1  •  loratadine (CLARITIN) 10 MG tablet, Take 1 tablet by mouth every night at bedtime., Disp: , Rfl:   •  multivitamin with minerals tablet tablet, Take 1 tablet by mouth Daily. On A Ketone/Vitamin drink regimen, Disp: , Rfl:   •  omeprazole (priLOSEC) 40 MG capsule, Take 40 mg by mouth Daily., Disp: , Rfl: 0  •  ondansetron ODT (ZOFRAN-ODT) 4 MG disintegrating tablet, Dissolve 1 tablet on the tongue every 8 hours asa needed to relieve nausea., Disp: 42 tablet, Rfl: 1  •  Plecanatide (Trulance) 3 MG tablet, Take 1 tablet by mouth Daily. Needs office visit for further refills., Disp: 30 tablet, Rfl: 0  •  predniSONE (DELTASONE) 10 MG tablet, Take 20 mg by mouth Daily. Pt states dosage flucuates, Disp: , Rfl:   •  Sennosides (Chocolated Laxative) 15 MG chewable tablet, Chew. 4 squares per week, Disp: , Rfl:   •  methotrexate 2.5 MG tablet, Take 12.5 mg by mouth 1 (One) Time Per Week. Take 4 pills weekly, Disp: , Rfl: 3  •  sertraline (ZOLOFT) 50 MG tablet, Take 1 tablet by mouth Daily., Disp: , Rfl:    Allergies   Allergen Reactions   • Iodine Hives   • Latex Hives      Past Surgical  History:   Procedure Laterality Date   • COLONOSCOPY N/A 3/1/2021    Procedure: COLONOSCOPY WITH BIOPSY AND POLYPECTOMY;  Surgeon: Rui Palacios MD;  Location: [facility] ENDOSCOPY;  Service: Gastroenterology;  Laterality: N/A;   • CYST REMOVAL  1991    back    • DILATATION AND CURETTAGE     • MOUTH SURGERY      teeth removal   • TONSILLECTOMY AND ADENOIDECTOMY     • TUBAL ABDOMINAL LIGATION        Social History     Socioeconomic History   • Marital status:    Tobacco Use   • Smoking status: Former     Packs/day: 1.50     Types: Electronic Cigarette, Cigarettes     Start date:      Quit date:      Years since quittin.7   • Smokeless tobacco: Never   Vaping Use   • Vaping Use: Every day   • Start date: 2020   • Substances: Nicotine, Flavoring   • Devices: Disposable, Refillable tank   Substance and Sexual Activity   • Alcohol use: Yes     Comment: social   • Drug use: Yes     Types: Heroin     Comment: 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     • Sexual activity: Yes     Partners: Male     Birth control/protection: None      Family History   Problem Relation Age of Onset   • Colon cancer Paternal Grandfather    • Cancer Paternal Grandfather    • Aneurysm Paternal Grandfather         brain   • Dementia Mother    • Stroke Mother    • Diabetes Mother    • COPD Mother    • Migraines Mother    • Alcohol abuse Mother    • Drug abuse Mother    • Rheum arthritis Father    • Lymphoma Father    • COPD Father    • Drug abuse Brother    • Diabetes Paternal Grandmother    • Pancreatitis Brother    • Diabetes Brother    • Drug abuse Brother    • Migraines Daughter    • Constipation Daughter    • Cirrhosis Neg Hx    • Liver cancer Neg Hx        Review of Systems   Constitutional: Negative for chills, diaphoresis and fever.   Gastrointestinal: Negative for constipation, diarrhea, nausea and vomiting.   Genitourinary: Positive for menstrual problem and pelvic pain. Negative for difficulty urinating  "and dysuria.           Objective   /68   Ht 149.9 cm (59\")   Wt 86.2 kg (190 lb)   BMI 38.38 kg/m²     Physical Exam  Constitutional:       Appearance: Normal appearance. She is well-developed and well-groomed.   Eyes:      General: Lids are normal.      Extraocular Movements: Extraocular movements intact.      Conjunctiva/sclera: Conjunctivae normal.   Abdominal:      Palpations: Abdomen is soft.      Tenderness: There is no abdominal tenderness.   Genitourinary:     Labia:         Right: No rash, tenderness or lesion.         Left: No rash, tenderness or lesion.       Urethra: No prolapse, urethral pain, urethral swelling or urethral lesion.      Vagina: No vaginal discharge, tenderness or lesions.      Cervix: No cervical motion tenderness, discharge, friability or lesion.      Uterus: Not enlarged and not tender.       Adnexa:         Right: No mass or tenderness.          Left: No mass or tenderness.     Skin:     General: Skin is warm and dry.      Findings: No bruising or lesion.   Neurological:      General: No focal deficit present.      Mental Status: She is alert and oriented to person, place, and time.   Psychiatric:         Attention and Perception: Attention normal.         Mood and Affect: Mood normal.         Speech: Speech normal.         Behavior: Behavior is cooperative.            Result Review :   The following data was reviewed by: Tammy Malcolm PA-C on 10/11/2022:  AMSLABLIST: Testosterone, FSH, LH, Estradiol, Progesterone, Prolactin, TSH and DHEAS              Assessment and Plan  Diagnoses and all orders for this visit:    1. Amenorrhea (Primary)  -     Testosterone (Free & Total), LC / MS  -     17-Hydroxyprogesterone  -     DHEA-Sulfate  -     Estradiol  -     Follicle Stimulating Hormone  -     Prolactin  -     HCG, Quantitative, Pregnancy (Hospital Lab Only); Future  -     US Non-ob Transvaginal    2. Pelvic cramping  -     Testosterone (Free & Total), LC / MS  -     " 17-Hydroxyprogesterone  -     DHEA-Sulfate  -     Estradiol  -     Follicle Stimulating Hormone  -     Prolactin  -     HCG, Quantitative, Pregnancy (Hospital Lab Only); Future  -     US Non-ob Transvaginal    3. Decreased libido  -     Testosterone (Free & Total), LC / MS  -     17-Hydroxyprogesterone  -     DHEA-Sulfate  -     Estradiol  -     Follicle Stimulating Hormone  -     Prolactin    4. Elevated prolactin level  -     Prolactin      Patient Instructions   We will check labs and patient is advised to have labs drawn fasting around 8 AM for accurate prolactin level.  Follow-up in about 1 week for pelvic ultrasound.  Further evaluation/management pending results.             This note was electronically signed.    Tammy Malcolm PA-C   October 11, 2022

## 2022-10-17 ENCOUNTER — LAB REQUISITION (OUTPATIENT)
Dept: LAB | Facility: HOSPITAL | Age: 37
End: 2022-10-17

## 2022-10-17 DIAGNOSIS — L70.9 ACNE, UNSPECIFIED: ICD-10-CM

## 2022-10-17 LAB
ALBUMIN SERPL-MCNC: 4.4 G/DL (ref 3.5–5.2)
ALBUMIN/GLOB SERPL: 1.6 G/DL
ALP SERPL-CCNC: 72 U/L (ref 39–117)
ALT SERPL W P-5'-P-CCNC: 12 U/L (ref 1–33)
ANION GAP SERPL CALCULATED.3IONS-SCNC: 13.4 MMOL/L (ref 5–15)
AST SERPL-CCNC: 15 U/L (ref 1–32)
BILIRUB SERPL-MCNC: 0.7 MG/DL (ref 0–1.2)
BUN SERPL-MCNC: 17 MG/DL (ref 6–20)
BUN/CREAT SERPL: 24.6 (ref 7–25)
CALCIUM SPEC-SCNC: 9.1 MG/DL (ref 8.6–10.5)
CHLORIDE SERPL-SCNC: 102 MMOL/L (ref 98–107)
CHOLEST SERPL-MCNC: 190 MG/DL (ref 0–200)
CO2 SERPL-SCNC: 22.6 MMOL/L (ref 22–29)
CREAT SERPL-MCNC: 0.69 MG/DL (ref 0.57–1)
EGFRCR SERPLBLD CKD-EPI 2021: 113.4 ML/MIN/1.73
ESTRADIOL SERPL HS-MCNC: 77.9 PG/ML
FSH SERPL-ACNC: 5.3 MIU/ML
GGT SERPL-CCNC: 13 U/L (ref 5–36)
GLOBULIN UR ELPH-MCNC: 2.7 GM/DL
GLUCOSE SERPL-MCNC: 93 MG/DL (ref 65–99)
HCG INTACT+B SERPL-ACNC: <0.1 MIU/ML
POTASSIUM SERPL-SCNC: 4.1 MMOL/L (ref 3.5–5.2)
PROLACTIN SERPL-MCNC: 16.6 NG/ML (ref 4.79–23.3)
PROT SERPL-MCNC: 7.1 G/DL (ref 6–8.5)
SODIUM SERPL-SCNC: 138 MMOL/L (ref 136–145)
TRIGL SERPL-MCNC: 139 MG/DL (ref 0–150)

## 2022-10-17 PROCEDURE — 80053 COMPREHEN METABOLIC PANEL: CPT | Performed by: SPECIALIST

## 2022-10-17 PROCEDURE — 84702 CHORIONIC GONADOTROPIN TEST: CPT | Performed by: PHYSICIAN ASSISTANT

## 2022-10-17 PROCEDURE — 84146 ASSAY OF PROLACTIN: CPT | Performed by: PHYSICIAN ASSISTANT

## 2022-10-17 PROCEDURE — 82627 DEHYDROEPIANDROSTERONE: CPT | Performed by: PHYSICIAN ASSISTANT

## 2022-10-17 PROCEDURE — 82977 ASSAY OF GGT: CPT | Performed by: SPECIALIST

## 2022-10-17 PROCEDURE — 84403 ASSAY OF TOTAL TESTOSTERONE: CPT | Performed by: PHYSICIAN ASSISTANT

## 2022-10-17 PROCEDURE — 82465 ASSAY BLD/SERUM CHOLESTEROL: CPT | Performed by: SPECIALIST

## 2022-10-17 PROCEDURE — 82670 ASSAY OF TOTAL ESTRADIOL: CPT | Performed by: PHYSICIAN ASSISTANT

## 2022-10-17 PROCEDURE — 36415 COLL VENOUS BLD VENIPUNCTURE: CPT | Performed by: PHYSICIAN ASSISTANT

## 2022-10-17 PROCEDURE — 83001 ASSAY OF GONADOTROPIN (FSH): CPT | Performed by: PHYSICIAN ASSISTANT

## 2022-10-17 PROCEDURE — 84478 ASSAY OF TRIGLYCERIDES: CPT | Performed by: SPECIALIST

## 2022-10-17 PROCEDURE — 84402 ASSAY OF FREE TESTOSTERONE: CPT | Performed by: PHYSICIAN ASSISTANT

## 2022-10-17 PROCEDURE — 83498 ASY HYDROXYPROGESTERONE 17-D: CPT | Performed by: PHYSICIAN ASSISTANT

## 2022-10-18 LAB — DHEA-S SERPL-MCNC: 39.5 UG/DL (ref 57.3–279.2)

## 2022-10-19 DIAGNOSIS — E29.1 ANDROGEN DEFICIENCY: Primary | ICD-10-CM

## 2022-10-19 NOTE — PROGRESS NOTES
Please inform patient labs show her prolactin level is normal range now. Her DHEA-S  level is low consistent with androgen deficiency which could explain her symptoms. I would like her to keep appt for ultrasound she has scheduled. I will refer her to endocrinologist for low DHEA-S.

## 2022-10-20 ENCOUNTER — OFFICE VISIT (OUTPATIENT)
Dept: INTERNAL MEDICINE | Facility: CLINIC | Age: 37
End: 2022-10-20

## 2022-10-20 VITALS
HEIGHT: 59 IN | TEMPERATURE: 97.5 F | WEIGHT: 196 LBS | BODY MASS INDEX: 39.51 KG/M2 | HEART RATE: 82 BPM | SYSTOLIC BLOOD PRESSURE: 112 MMHG | OXYGEN SATURATION: 98 % | DIASTOLIC BLOOD PRESSURE: 70 MMHG

## 2022-10-20 DIAGNOSIS — M06.9 RHEUMATOID ARTHRITIS INVOLVING MULTIPLE SITES, UNSPECIFIED WHETHER RHEUMATOID FACTOR PRESENT: ICD-10-CM

## 2022-10-20 DIAGNOSIS — M32.9 LUPUS: ICD-10-CM

## 2022-10-20 DIAGNOSIS — K21.9 GASTROESOPHAGEAL REFLUX DISEASE, UNSPECIFIED WHETHER ESOPHAGITIS PRESENT: ICD-10-CM

## 2022-10-20 DIAGNOSIS — M79.7 FIBROMYALGIA: ICD-10-CM

## 2022-10-20 DIAGNOSIS — R73.01 IMPAIRED FASTING BLOOD SUGAR: ICD-10-CM

## 2022-10-20 DIAGNOSIS — E03.9 ACQUIRED HYPOTHYROIDISM: ICD-10-CM

## 2022-10-20 DIAGNOSIS — E66.09 CLASS 2 OBESITY DUE TO EXCESS CALORIES WITHOUT SERIOUS COMORBIDITY WITH BODY MASS INDEX (BMI) OF 39.0 TO 39.9 IN ADULT: ICD-10-CM

## 2022-10-20 DIAGNOSIS — R60.0 BILATERAL LOWER EXTREMITY EDEMA: ICD-10-CM

## 2022-10-20 DIAGNOSIS — E29.1 ANDROGEN DEFICIENCY: ICD-10-CM

## 2022-10-20 DIAGNOSIS — F60.3 BORDERLINE PERSONALITY DISORDER: ICD-10-CM

## 2022-10-20 DIAGNOSIS — Z76.89 ENCOUNTER TO ESTABLISH CARE: Primary | ICD-10-CM

## 2022-10-20 DIAGNOSIS — B18.2 CHRONIC HEPATITIS C WITHOUT HEPATIC COMA: ICD-10-CM

## 2022-10-20 LAB
17OHP SERPL-MCNC: 33 NG/DL
TESTOST FREE SERPL-MCNC: 0.5 PG/ML (ref 0–4.2)
TESTOST SERPL-MCNC: 14.3 NG/DL (ref 10–55)

## 2022-10-20 PROCEDURE — 99214 OFFICE O/P EST MOD 30 MIN: CPT | Performed by: NURSE PRACTITIONER

## 2022-10-20 RX ORDER — FUROSEMIDE 20 MG/1
20 TABLET ORAL DAILY
Qty: 30 TABLET | Refills: 5 | Status: SHIPPED | OUTPATIENT
Start: 2022-10-20

## 2022-10-20 RX ORDER — CYANOCOBALAMIN 1000 UG/ML
1000 INJECTION, SOLUTION INTRAMUSCULAR; SUBCUTANEOUS
Qty: 1 ML | Refills: 3 | Status: SHIPPED | OUTPATIENT
Start: 2022-10-20

## 2022-10-20 RX ORDER — CYANOCOBALAMIN 1000 UG/ML
INJECTION, SOLUTION INTRAMUSCULAR; SUBCUTANEOUS
COMMUNITY
Start: 2022-10-20 | End: 2022-10-20 | Stop reason: SDUPTHER

## 2022-10-20 RX ORDER — ISOTRETINOIN 20 MG/1
20 CAPSULE ORAL 2 TIMES DAILY
COMMUNITY

## 2022-10-20 RX ORDER — OMEPRAZOLE 40 MG/1
40 CAPSULE, DELAYED RELEASE ORAL DAILY
Qty: 30 CAPSULE | Refills: 5 | Status: SHIPPED | OUTPATIENT
Start: 2022-10-20

## 2022-10-20 NOTE — PROGRESS NOTES
Office Visit      Patient Name: Alicia Atkinson  : 1985   MRN: [id number]   Care Team: Patient Care Team:  Sandra Mireles APRN as PCP - General (Family Medicine)  Rosibel Guardado MD as Consulting Physician (Endocrinology)    Chief Complaint  Establish Care (History of RA, lupus, Fibromyalgia/Concerns for hormone issues)    Subjective     Subjective      Alicia Atkinson presents to [facility] to establish care, medication refills, and weight loss.   Suffers from hypothyroidism, rheumatoid arthritis, lupus, and fibromyalgia.   She is established with rheumatology who manages her rheumatological disorders listed above.   She is transferring care today from [facility] and needing refills on her medications.   She is established with OBGYN with Dr. Deras office. She had recent labs through them, initially showed elevated prolactin but came down on repeat. Now DHEA-S has been low, she has been referred to endocrinology and has an appointment in January.   She has had treatment for hepatitis C and is established with the suboxone clinic.   Complains of difficulty losing weight today. She is starting to get discouraged as she feels like everyone she knows is getting injectable medication and losing weight and she feels stuck. She is really wanting to start monjaro for this problem.   She has no family history of thyroid carcinoma, a few people in her family have chronic pancreatitis and she has had acute pancreatitis in the past but not diagnosed with chronic.   She tells me today she is following a strict low carbohydrate/sugar diet and eating high protein. /She is tracking all of her meals and generally eats one meal per day. She is not interested in seeing nutrition at this time as she feels she has a good diet. 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 She does have a pending  "referral to bariatric surgery to discuss further weight loss options.   No recent new medications to account for her weight gain. She does take prednisone as needed for rheumatologic conditions, .dosage fluctuates per her report has not taken it lately.   She also takes 20-40 mg of furosemide daily for lower extremity swelling, usually only 20 mg is needed, if she takes 40 mg she develops bothersome lower extremity cramping.     Objective     Objective   Vital Signs:   /70   Pulse 82   Temp 97.5 °F (36.4 °C)   Ht 149.9 cm (59\")   Wt 88.9 kg (196 lb)   SpO2 98%   BMI 39.59 kg/m²     Physical Exam  Vitals and nursing note reviewed.   Constitutional:       General: She is not in acute distress.     Appearance: Normal appearance. She is obese. She is not toxic-appearing.   Eyes:      Pupils: Pupils are equal, round, and reactive to light.   Neck:      Vascular: No carotid bruit.   Cardiovascular:      Rate and Rhythm: Normal rate and regular rhythm.      Heart sounds: Normal heart sounds. No murmur heard.  Pulmonary:      Effort: Pulmonary effort is normal. No respiratory distress.      Breath sounds: Normal breath sounds. No wheezing.   Abdominal:      General: Bowel sounds are normal. There is no distension.      Palpations: Abdomen is soft.      Tenderness: There is no abdominal tenderness.   Musculoskeletal:      Cervical back: Neck supple. No tenderness.   Skin:     General: Skin is warm and dry.      Findings: No rash.   Neurological:      General: No focal deficit present.      Mental Status: She is alert.   Psychiatric:         Mood and Affect: Mood normal. Affect is tearful.         Behavior: Behavior normal.          Assessment / Plan      Assessment & Plan   Problem List Items Addressed This Visit        Endocrine and Metabolic    Androgen deficiency    She initially thought endocrinology appointment wasn't until March, advised she is scheduled in January and given appointment information. Advised " to keep appointment, possibly contributing to difficulty losing weight?    Acquired hypothyroidism    Relevant Orders    TSH Rfx On Abnormal To Free T4 (Completed)    Update TSH today as has not been checked recently. Will titrated levothyroxine as indicated. Reiterated proper administration of the medication.        Gastrointestinal Abdominal     Chronic hepatitis C without hepatic coma (HCC)    Previously treated through hepatitis c clinic, no further drug use. Follows with suboxone clinic.        Mental Health    Borderline personality disorder (HCC)    Sertraline has been most beneficial for this problem. Continue at current dose, questionable biploar disorder. May need to establish with psych if symptoms are uncontrolled. ER with any suicidal ideations.        Multi-system (Lupus, Sarcoid...)    Lupus (HCC)    Advised patient rheumatology should continue managing, previous PCP would refill medications in between rheumatology appointments. Advised I am not willing to do this and one provider should be managing medications at a time. Keep follow-up with rheumatology. Continue medications at current dose.       Musculoskeletal and Injuries    Fibromyalgia    Managed by rheumatology ,  See above. Advised rheumatology will need to manage gabapentin .     Rheumatoid arthritis involving multiple sites (HCC)    Managed by rheumatology, see above.    Other Visit Diagnoses     Encounter to establish care    -  Primary    Impaired fasting blood sugar        Relevant Orders    Hemoglobin A1c (Completed)    Bilateral lower extremity edema    I do not endorse swelling on exam today. Advised lowest dose of furosemide daily, will refill 20 mg. Recommend DASH diet, compression stockings, and elevation.     GERD    Avoid eating spicy foods, caffeinated and carbonated beverages, alcohol, and chocolate. Try not to eat or drink within 3 hours of going to bed at night. May elevate the head of the bed. Eat smaller portions. Adhere to  medication regimen as prescribed.    Class 2 obesity due to excess calories without serious comorbidity with body mass index (BMI) of 39.0 to 39.9 in adult        Has pending appointment with bariatric surgery, advised to keep. Discussed risk vs benefits of injectable medications and unfortunately her insurance will not cover these agents for weight loss. I would be very cautious due to her history of pancreatitis and would like for her to follow-up with bariatrics prior to initiation. She became tearful and I have advised on price of out of pocket cost , coupon card will also not work with current insurance. Advised to bring food diary to next visit and strongly consider seeing dietician. Recommend 3 healthy meals per day high in protein and healthy snacks to prevent overeating at night. Increase water and at least 30 minutes of structured exercise daily as tolerated. Follow-up in 4 weeks.          Follow Up   Return in about 4 weeks (around 11/17/2022) for Next scheduled follow up.  Patient was given instructions and counseling regarding her condition or for health maintenance advice. Please see specific information pulled into the AVS if appropriate.     ATA Mendoza  [facility]

## 2022-10-21 LAB
HBA1C MFR BLD: 4.8 % (ref 4.8–5.6)
TSH SERPL DL<=0.05 MIU/L-ACNC: 1.94 UIU/ML (ref 0.27–4.2)

## 2022-10-21 PROCEDURE — 84443 ASSAY THYROID STIM HORMONE: CPT | Performed by: NURSE PRACTITIONER

## 2022-10-21 PROCEDURE — 36415 COLL VENOUS BLD VENIPUNCTURE: CPT | Performed by: NURSE PRACTITIONER

## 2022-10-21 PROCEDURE — 83036 HEMOGLOBIN GLYCOSYLATED A1C: CPT | Performed by: NURSE PRACTITIONER

## 2022-10-24 PROBLEM — IMO0002 LUPUS: Status: ACTIVE | Noted: 2022-10-24

## 2022-10-24 PROBLEM — M79.7 FIBROMYALGIA: Status: ACTIVE | Noted: 2022-10-24

## 2022-10-24 PROBLEM — E03.9 ACQUIRED HYPOTHYROIDISM: Status: ACTIVE | Noted: 2022-10-24

## 2022-10-24 PROBLEM — B18.2 CHRONIC HEPATITIS C WITHOUT HEPATIC COMA: Status: ACTIVE | Noted: 2022-10-24

## 2022-10-24 PROBLEM — F60.3 BORDERLINE PERSONALITY DISORDER: Status: ACTIVE | Noted: 2022-10-24

## 2022-10-24 PROBLEM — M06.9 RHEUMATOID ARTHRITIS INVOLVING MULTIPLE SITES: Status: ACTIVE | Noted: 2022-10-24

## 2022-10-24 PROBLEM — M32.9 LUPUS (HCC): Status: ACTIVE | Noted: 2022-10-24

## 2022-10-27 ENCOUNTER — DOCUMENTATION (OUTPATIENT)
Dept: OBSTETRICS AND GYNECOLOGY | Facility: CLINIC | Age: 37
End: 2022-10-27

## 2022-10-27 NOTE — PROGRESS NOTES
Patient is informed of results of pelvic ultrasound done yesterday.  Uterus is anteverted with 1 small 2.4 cm intramural fibroid.  Bilateral ovaries are normal with vascular flow there is no free fluid or adnexal masses.  Patient is advised to keep her appointment scheduled with endocrinology and will observe for the next 2 to 3 months to see if she has spontaneous menstrual cycles.  Patient voices understanding.

## 2022-11-19 ENCOUNTER — LAB REQUISITION (OUTPATIENT)
Dept: LAB | Facility: HOSPITAL | Age: 37
End: 2022-11-19

## 2022-11-19 DIAGNOSIS — M35.00 SJOGREN SYNDROME, UNSPECIFIED: ICD-10-CM

## 2022-11-19 DIAGNOSIS — R53.83 OTHER FATIGUE: ICD-10-CM

## 2022-11-19 DIAGNOSIS — M05.79 RHEUMATOID ARTHRITIS WITH RHEUMATOID FACTOR OF MULTIPLE SITES WITHOUT ORGAN OR SYSTEMS INVOLVEMENT: ICD-10-CM

## 2022-11-19 DIAGNOSIS — Z51.81 ENCOUNTER FOR THERAPEUTIC DRUG LEVEL MONITORING: ICD-10-CM

## 2022-11-19 LAB
ALBUMIN SERPL-MCNC: 4.2 G/DL (ref 3.5–5.2)
ALBUMIN/GLOB SERPL: 1.4 G/DL
ALP SERPL-CCNC: 67 U/L (ref 39–117)
ALT SERPL W P-5'-P-CCNC: 19 U/L (ref 1–33)
ANION GAP SERPL CALCULATED.3IONS-SCNC: 10.2 MMOL/L (ref 5–15)
AST SERPL-CCNC: 17 U/L (ref 1–32)
BASOPHILS # BLD AUTO: 0.04 10*3/MM3 (ref 0–0.2)
BASOPHILS NFR BLD AUTO: 0.5 % (ref 0–1.5)
BILIRUB SERPL-MCNC: 0.7 MG/DL (ref 0–1.2)
BUN SERPL-MCNC: 14 MG/DL (ref 6–20)
BUN/CREAT SERPL: 18.7 (ref 7–25)
CALCIUM SPEC-SCNC: 8.9 MG/DL (ref 8.6–10.5)
CHLORIDE SERPL-SCNC: 99 MMOL/L (ref 98–107)
CO2 SERPL-SCNC: 28.8 MMOL/L (ref 22–29)
CREAT SERPL-MCNC: 0.75 MG/DL (ref 0.57–1)
CRP SERPL-MCNC: 0.33 MG/DL (ref 0–0.5)
DEPRECATED RDW RBC AUTO: 41.1 FL (ref 37–54)
EGFRCR SERPLBLD CKD-EPI 2021: 104 ML/MIN/1.73
EOSINOPHIL # BLD AUTO: 0.16 10*3/MM3 (ref 0–0.4)
EOSINOPHIL NFR BLD AUTO: 2 % (ref 0.3–6.2)
ERYTHROCYTE [DISTWIDTH] IN BLOOD BY AUTOMATED COUNT: 11.9 % (ref 12.3–15.4)
ERYTHROCYTE [SEDIMENTATION RATE] IN BLOOD: 5 MM/HR (ref 0–20)
GLOBULIN UR ELPH-MCNC: 3.1 GM/DL
GLUCOSE SERPL-MCNC: 85 MG/DL (ref 65–99)
HCT VFR BLD AUTO: 40.9 % (ref 34–46.6)
HGB BLD-MCNC: 14.6 G/DL (ref 12–15.9)
IMM GRANULOCYTES # BLD AUTO: 0.01 10*3/MM3 (ref 0–0.05)
IMM GRANULOCYTES NFR BLD AUTO: 0.1 % (ref 0–0.5)
LYMPHOCYTES # BLD AUTO: 3.25 10*3/MM3 (ref 0.7–3.1)
LYMPHOCYTES NFR BLD AUTO: 40.9 % (ref 19.6–45.3)
MCH RBC QN AUTO: 33.4 PG (ref 26.6–33)
MCHC RBC AUTO-ENTMCNC: 35.7 G/DL (ref 31.5–35.7)
MCV RBC AUTO: 93.6 FL (ref 79–97)
MONOCYTES # BLD AUTO: 0.7 10*3/MM3 (ref 0.1–0.9)
MONOCYTES NFR BLD AUTO: 8.8 % (ref 5–12)
NEUTROPHILS NFR BLD AUTO: 3.79 10*3/MM3 (ref 1.7–7)
NEUTROPHILS NFR BLD AUTO: 47.7 % (ref 42.7–76)
NRBC BLD AUTO-RTO: 0 /100 WBC (ref 0–0.2)
PLATELET # BLD AUTO: 266 10*3/MM3 (ref 140–450)
PMV BLD AUTO: 9.9 FL (ref 6–12)
POTASSIUM SERPL-SCNC: 3.7 MMOL/L (ref 3.5–5.2)
PROT SERPL-MCNC: 7.3 G/DL (ref 6–8.5)
RBC # BLD AUTO: 4.37 10*6/MM3 (ref 3.77–5.28)
SODIUM SERPL-SCNC: 138 MMOL/L (ref 136–145)
WBC NRBC COR # BLD: 7.95 10*3/MM3 (ref 3.4–10.8)

## 2022-11-19 PROCEDURE — 86140 C-REACTIVE PROTEIN: CPT | Performed by: INTERNAL MEDICINE

## 2022-11-19 PROCEDURE — 86235 NUCLEAR ANTIGEN ANTIBODY: CPT | Performed by: INTERNAL MEDICINE

## 2022-11-19 PROCEDURE — 80053 COMPREHEN METABOLIC PANEL: CPT | Performed by: INTERNAL MEDICINE

## 2022-11-19 PROCEDURE — 85025 COMPLETE CBC W/AUTO DIFF WBC: CPT | Performed by: INTERNAL MEDICINE

## 2022-11-19 PROCEDURE — 85652 RBC SED RATE AUTOMATED: CPT | Performed by: INTERNAL MEDICINE

## 2022-11-21 LAB
ENA SS-A AB SER-ACNC: <0.2 AI (ref 0–0.9)
ENA SS-B AB SER-ACNC: <0.2 AI (ref 0–0.9)

## 2022-11-25 DIAGNOSIS — R11.0 NAUSEA: ICD-10-CM

## 2022-11-28 RX ORDER — ONDANSETRON 4 MG/1
TABLET, ORALLY DISINTEGRATING ORAL
Qty: 42 TABLET | Refills: 1 | OUTPATIENT
Start: 2022-11-28

## 2022-12-01 RX ORDER — LEVOTHYROXINE SODIUM 0.03 MG/1
25 TABLET ORAL DAILY
Qty: 90 TABLET | Refills: 0 | OUTPATIENT
Start: 2022-12-01 | End: 2023-03-24

## 2023-01-24 ENCOUNTER — OFFICE VISIT (OUTPATIENT)
Dept: ENDOCRINOLOGY | Facility: CLINIC | Age: 38
End: 2023-01-24
Payer: COMMERCIAL

## 2023-01-24 VITALS
SYSTOLIC BLOOD PRESSURE: 144 MMHG | HEART RATE: 80 BPM | HEIGHT: 59 IN | BODY MASS INDEX: 33.87 KG/M2 | DIASTOLIC BLOOD PRESSURE: 90 MMHG | WEIGHT: 168 LBS | OXYGEN SATURATION: 99 %

## 2023-01-24 DIAGNOSIS — Z79.52 LONG TERM SYSTEMIC STEROID USER: ICD-10-CM

## 2023-01-24 DIAGNOSIS — E03.9 HYPOTHYROIDISM, UNSPECIFIED TYPE: ICD-10-CM

## 2023-01-24 DIAGNOSIS — R68.89 ABNORMAL ENDOCRINE LABORATORY TEST FINDING: Primary | ICD-10-CM

## 2023-01-24 PROCEDURE — 99214 OFFICE O/P EST MOD 30 MIN: CPT | Performed by: INTERNAL MEDICINE

## 2023-01-24 NOTE — PROGRESS NOTES
"Chief Complaint   Patient presents with   • Abnormal Lab        HPI   Alicia Atkinson is a 37 y.o. female had concerns including Abnormal Lab.      Patient reports that she was encouraged to return for further evaluation after abnormal labs revealed low DHEA-S.  She reports that this was checked as she was fatigued and feeling generally poorly.  She reports that the symptoms have worsened over the last few years.  Of note, she does have a history of prolonged steroid use and estimates that she is used 20 to 40 mg of prednisone daily on a fairly regular basis for the past 20 years.  She does report that since starting immunotherapy for her RA she has been using steroids more intermittently.  She does report that her symptoms seem to resolve somewhat when she is taking steroids.  Patient does report that she has noted a biweekly DHEA supplement upon the recommendation of the referring provider.    Patient continues on levothyroxine 25 mcg daily for hypothyroidism.  She denies missed doses of this medication.    The following portions of the patient's history were reviewed and updated as appropriate: allergies, current medications and past social history.    Review of Systems   Cardiovascular: Negative for palpitations.   Gastrointestinal: Negative for abdominal pain, nausea and vomiting.   Endocrine: Negative for polydipsia and polyuria.      /90 (BP Location: Left arm, Patient Position: Sitting, Cuff Size: Adult)   Pulse 80   Ht 149.9 cm (59\")   Wt 76.2 kg (168 lb)   SpO2 99%   BMI 33.93 kg/m²      Physical Exam      Constitutional:  well developed; well nourished  no acute distress   ENT/Thyroid: not examined   Eyes: Conjunctiva: clear   Respiratory:  breathing is unlabored  clear to auscultation bilaterally   Cardiovascular:  regular rate and rhythm   Chest:  Not performed.   Abdomen: soft, non-tender; no masses   : Not performed.   Musculoskeletal: Not performed   Skin: not performed.   Neuro: mental " status, speech normal   Psych: mood and affect are within normal limits       Labs/Imaging   Latest Reference Range & Units 04/19/22 10:09 10/17/22 07:54 10/21/22 08:19 10/21/22 08:31   17-Hydroxyprogesterone ng/dL  33     Cortisol mcg/dL 17.80      DHEA-Sulfate 57.3 - 279.2 ug/dL 55.9 (L) 39.5 (L)     LH mIU/mL 8.19      FSH mIU/mL 6.84 5.30     Prolactin 4.79 - 23.30 ng/mL 26.10 (H) 16.60     Hemoglobin A1C 4.80 - 5.60 % 5.10   4.80   Estradiol pg/mL  77.9     Estrogen pg/mL 80      Progesterone ng/mL 0.17      Testosterone, Total 10.0 - 55.0 ng/dL 24.8 14.3     Testosterone, Free 0.0 - 4.2 pg/mL 1.4 0.5     TSH Baseline 0.270 - 4.200 uIU/mL 2.360  1.940    Free T4 0.93 - 1.70 ng/dL 1.28      T3, Total 80.0 - 200.0 ng/dl 123.0      T3, Free 2.00 - 4.40 pg/mL 3.38      T3, Reverse 9.2 - 24.1 ng/dL 13.7      Thyroid Peroxidase Antibody 0 - 34 IU/mL 14      (L): Data is abnormally low  (H): Data is abnormally high    Diagnoses and all orders for this visit:    1. Abnormal endocrine laboratory test finding (Primary)  Patient noted to have low DHEAS  Patient is currently taking DHEA supplement upon the recommendation of an outside provider.  Discussed that there is minimal data to recommend routine supplementation of DHEA and that supplements are not regulated materials.  Discussed potential unknown risks.  Reviewed with patient that I do not routinely recommend replacement of DHEA.  We did discuss that DHEAS abnormality could be due to evaluation of other adrenal/pituitary hormones.  Discussed that decreased ACTH could lead to a downstream abnormality in DHEA-S.  Plan to have patient obtain ACTH, AM cortisol, DHEAS after she has been off steroids for approximately 2 weeks.  Determine neck steps after review of labs.    2. Hypothyroidism, unspecified type  Thyroid function testing was normal in October 2022, continue current levothyroxine    3.  Long term systemic steroid user  Patient reports a more than 20-year  history of supraphysiologic steroid use.  We did discuss potential for iatrogenic adrenal insufficiency/steroid withdrawal.  Patient does report that a lot of her symptoms correlate to the timing of discontinued use of daily steroids.  Plan to check AM cortisol/ACTH, as above    Return in about 4 months (around 5/24/2023) for Next scheduled follow up. The patient was instructed to contact the clinic with any interval questions or concerns.    Rosibel Guardado MD   Endocrinologist    Dictated Utilizing Dragon Dictation

## 2023-03-07 LAB — CORTIS SERPL-MCNC: 10.1 MCG/DL

## 2023-03-07 PROCEDURE — 82024 ASSAY OF ACTH: CPT | Performed by: INTERNAL MEDICINE

## 2023-03-07 PROCEDURE — 82533 TOTAL CORTISOL: CPT | Performed by: INTERNAL MEDICINE

## 2023-03-07 PROCEDURE — 36415 COLL VENOUS BLD VENIPUNCTURE: CPT | Performed by: INTERNAL MEDICINE

## 2023-03-07 PROCEDURE — 82627 DEHYDROEPIANDROSTERONE: CPT | Performed by: INTERNAL MEDICINE

## 2023-03-08 LAB
ACTH PLAS-MCNC: 22.6 PG/ML (ref 7.2–63.3)
DHEA-S SERPL-MCNC: 53.4 UG/DL (ref 57.3–279.2)

## 2023-03-21 ENCOUNTER — LAB (OUTPATIENT)
Dept: LAB | Facility: HOSPITAL | Age: 38
End: 2023-03-21
Payer: COMMERCIAL

## 2023-03-21 DIAGNOSIS — R30.0 DYSURIA: Primary | ICD-10-CM

## 2023-03-21 DIAGNOSIS — R30.0 DYSURIA: ICD-10-CM

## 2023-03-21 LAB
BACTERIA UR QL AUTO: ABNORMAL /HPF
BILIRUB UR QL STRIP: NEGATIVE
CLARITY UR: ABNORMAL
COLOR UR: YELLOW
GLUCOSE UR STRIP-MCNC: NEGATIVE MG/DL
HGB UR QL STRIP.AUTO: ABNORMAL
HYALINE CASTS UR QL AUTO: ABNORMAL /LPF
KETONES UR QL STRIP: ABNORMAL
LEUKOCYTE ESTERASE UR QL STRIP.AUTO: ABNORMAL
NITRITE UR QL STRIP: NEGATIVE
PH UR STRIP.AUTO: <=5 [PH] (ref 5–8)
PROT UR QL STRIP: NEGATIVE
RBC # UR STRIP: ABNORMAL /HPF
REF LAB TEST METHOD: ABNORMAL
SP GR UR STRIP: 1.02 (ref 1–1.03)
SQUAMOUS #/AREA URNS HPF: ABNORMAL /HPF
UROBILINOGEN UR QL STRIP: ABNORMAL
WBC # UR STRIP: ABNORMAL /HPF

## 2023-03-21 PROCEDURE — 81001 URINALYSIS AUTO W/SCOPE: CPT

## 2023-03-21 PROCEDURE — 87186 SC STD MICRODIL/AGAR DIL: CPT

## 2023-03-21 PROCEDURE — 87077 CULTURE AEROBIC IDENTIFY: CPT

## 2023-03-21 PROCEDURE — 87086 URINE CULTURE/COLONY COUNT: CPT

## 2023-03-21 RX ORDER — NITROFURANTOIN 25; 75 MG/1; MG/1
100 CAPSULE ORAL 2 TIMES DAILY
Qty: 14 CAPSULE | Refills: 0 | Status: SHIPPED | OUTPATIENT
Start: 2023-03-21 | End: 2023-03-28

## 2023-03-23 LAB — BACTERIA SPEC AEROBE CULT: ABNORMAL

## 2023-04-09 ENCOUNTER — HOSPITAL ENCOUNTER (EMERGENCY)
Facility: HOSPITAL | Age: 38
Discharge: HOME | End: 2023-04-09
Attending: EMERGENCY MEDICINE | Admitting: EMERGENCY MEDICINE
Payer: COMMERCIAL

## 2023-04-09 VITALS
RESPIRATION RATE: 20 BRPM | WEIGHT: 153 LBS | TEMPERATURE: 98.9 F | HEART RATE: 100 BPM | OXYGEN SATURATION: 94 % | HEIGHT: 57 IN | BODY MASS INDEX: 33.01 KG/M2 | DIASTOLIC BLOOD PRESSURE: 54 MMHG | SYSTOLIC BLOOD PRESSURE: 95 MMHG

## 2023-04-09 DIAGNOSIS — N30.00 ACUTE CYSTITIS WITHOUT HEMATURIA: Primary | ICD-10-CM

## 2023-04-09 DIAGNOSIS — J06.9 VIRAL URI: ICD-10-CM

## 2023-04-09 LAB
ALBUMIN SERPL-MCNC: 3.8 G/DL (ref 3.5–5.2)
ALBUMIN/GLOB SERPL: 1.1 G/DL
ALP SERPL-CCNC: 44 U/L (ref 39–117)
ALT SERPL W P-5'-P-CCNC: 9 U/L (ref 1–33)
ANION GAP SERPL CALCULATED.3IONS-SCNC: 9.1 MMOL/L (ref 5–15)
AST SERPL-CCNC: 12 U/L (ref 1–32)
BACTERIA UR QL AUTO: ABNORMAL /HPF
BASOPHILS # BLD AUTO: 0.02 10*3/MM3 (ref 0–0.2)
BASOPHILS NFR BLD AUTO: 0.2 % (ref 0–1.5)
BILIRUB SERPL-MCNC: 1.2 MG/DL (ref 0–1.2)
BILIRUB UR QL STRIP: NEGATIVE
BUN SERPL-MCNC: 15 MG/DL (ref 6–20)
BUN/CREAT SERPL: 21.1 (ref 7–25)
CALCIUM SPEC-SCNC: 8.7 MG/DL (ref 8.6–10.5)
CHLORIDE SERPL-SCNC: 101 MMOL/L (ref 98–107)
CLARITY UR: CLEAR
CO2 SERPL-SCNC: 23.9 MMOL/L (ref 22–29)
COLOR UR: ABNORMAL
CREAT SERPL-MCNC: 0.71 MG/DL (ref 0.57–1)
D-LACTATE SERPL-SCNC: 0.8 MMOL/L (ref 0.5–2)
DEPRECATED RDW RBC AUTO: 41.3 FL (ref 37–54)
EGFRCR SERPLBLD CKD-EPI 2021: 111.1 ML/MIN/1.73
EOSINOPHIL # BLD AUTO: 0.04 10*3/MM3 (ref 0–0.4)
EOSINOPHIL NFR BLD AUTO: 0.3 % (ref 0.3–6.2)
ERYTHROCYTE [DISTWIDTH] IN BLOOD BY AUTOMATED COUNT: 11.9 % (ref 12.3–15.4)
GLOBULIN UR ELPH-MCNC: 3.4 GM/DL
GLUCOSE SERPL-MCNC: 112 MG/DL (ref 65–99)
GLUCOSE UR STRIP-MCNC: NEGATIVE MG/DL
HCT VFR BLD AUTO: 37.2 % (ref 34–46.6)
HGB BLD-MCNC: 13.1 G/DL (ref 12–15.9)
HGB UR QL STRIP.AUTO: ABNORMAL
HOLD SPECIMEN: NORMAL
HOLD SPECIMEN: NORMAL
HYALINE CASTS UR QL AUTO: ABNORMAL /LPF
IMM GRANULOCYTES # BLD AUTO: 0.07 10*3/MM3 (ref 0–0.05)
IMM GRANULOCYTES NFR BLD AUTO: 0.5 % (ref 0–0.5)
KETONES UR QL STRIP: NEGATIVE
LEUKOCYTE ESTERASE UR QL STRIP.AUTO: ABNORMAL
LYMPHOCYTES # BLD AUTO: 1.17 10*3/MM3 (ref 0.7–3.1)
LYMPHOCYTES NFR BLD AUTO: 8.9 % (ref 19.6–45.3)
MCH RBC QN AUTO: 33.2 PG (ref 26.6–33)
MCHC RBC AUTO-ENTMCNC: 35.2 G/DL (ref 31.5–35.7)
MCV RBC AUTO: 94.2 FL (ref 79–97)
MONOCYTES # BLD AUTO: 0.67 10*3/MM3 (ref 0.1–0.9)
MONOCYTES NFR BLD AUTO: 5.1 % (ref 5–12)
NEUTROPHILS NFR BLD AUTO: 11.17 10*3/MM3 (ref 1.7–7)
NEUTROPHILS NFR BLD AUTO: 85 % (ref 42.7–76)
NITRITE UR QL STRIP: POSITIVE
NRBC BLD AUTO-RTO: 0 /100 WBC (ref 0–0.2)
PH UR STRIP.AUTO: 6 [PH] (ref 5–8)
PLATELET # BLD AUTO: 183 10*3/MM3 (ref 140–450)
PMV BLD AUTO: 9.2 FL (ref 6–12)
POTASSIUM SERPL-SCNC: 3.3 MMOL/L (ref 3.5–5.2)
PROT SERPL-MCNC: 7.2 G/DL (ref 6–8.5)
PROT UR QL STRIP: ABNORMAL
RBC # BLD AUTO: 3.95 10*6/MM3 (ref 3.77–5.28)
RBC # UR STRIP: ABNORMAL /HPF
REF LAB TEST METHOD: ABNORMAL
S PYO AG THROAT QL: NEGATIVE
SODIUM SERPL-SCNC: 134 MMOL/L (ref 136–145)
SP GR UR STRIP: 1.02 (ref 1–1.03)
SQUAMOUS #/AREA URNS HPF: ABNORMAL /HPF
UROBILINOGEN UR QL STRIP: ABNORMAL
WBC # UR STRIP: ABNORMAL /HPF
WBC NRBC COR # BLD: 13.14 10*3/MM3 (ref 3.4–10.8)
WHOLE BLOOD HOLD COAG: NORMAL
WHOLE BLOOD HOLD SPECIMEN: NORMAL

## 2023-04-09 PROCEDURE — 87081 CULTURE SCREEN ONLY: CPT | Performed by: EMERGENCY MEDICINE

## 2023-04-09 PROCEDURE — 25010000002 PROCHLORPERAZINE 10 MG/2ML SOLUTION: Performed by: EMERGENCY MEDICINE

## 2023-04-09 PROCEDURE — 96374 THER/PROPH/DIAG INJ IV PUSH: CPT

## 2023-04-09 PROCEDURE — 81001 URINALYSIS AUTO W/SCOPE: CPT | Performed by: EMERGENCY MEDICINE

## 2023-04-09 PROCEDURE — 87880 STREP A ASSAY W/OPTIC: CPT | Performed by: EMERGENCY MEDICINE

## 2023-04-09 PROCEDURE — 99284 EMERGENCY DEPT VISIT MOD MDM: CPT

## 2023-04-09 PROCEDURE — 96375 TX/PRO/DX INJ NEW DRUG ADDON: CPT

## 2023-04-09 PROCEDURE — 80053 COMPREHEN METABOLIC PANEL: CPT | Performed by: EMERGENCY MEDICINE

## 2023-04-09 PROCEDURE — 25010000002 DIPHENHYDRAMINE PER 50 MG: Performed by: EMERGENCY MEDICINE

## 2023-04-09 PROCEDURE — 87040 BLOOD CULTURE FOR BACTERIA: CPT | Performed by: EMERGENCY MEDICINE

## 2023-04-09 PROCEDURE — 83605 ASSAY OF LACTIC ACID: CPT | Performed by: EMERGENCY MEDICINE

## 2023-04-09 PROCEDURE — 25010000002 KETOROLAC TROMETHAMINE PER 15 MG: Performed by: EMERGENCY MEDICINE

## 2023-04-09 PROCEDURE — 85025 COMPLETE CBC W/AUTO DIFF WBC: CPT | Performed by: EMERGENCY MEDICINE

## 2023-04-09 RX ORDER — CEFUROXIME AXETIL 500 MG/1
500 TABLET ORAL 2 TIMES DAILY
Qty: 14 TABLET | Refills: 0 | Status: SHIPPED | OUTPATIENT
Start: 2023-04-09 | End: 2023-04-16

## 2023-04-09 RX ORDER — DIPHENHYDRAMINE HYDROCHLORIDE 50 MG/ML
25 INJECTION INTRAMUSCULAR; INTRAVENOUS ONCE
Status: COMPLETED | OUTPATIENT
Start: 2023-04-09 | End: 2023-04-09

## 2023-04-09 RX ORDER — PROCHLORPERAZINE EDISYLATE 5 MG/ML
5 INJECTION INTRAMUSCULAR; INTRAVENOUS ONCE
Status: COMPLETED | OUTPATIENT
Start: 2023-04-09 | End: 2023-04-09

## 2023-04-09 RX ORDER — KETOROLAC TROMETHAMINE 30 MG/ML
15 INJECTION, SOLUTION INTRAMUSCULAR; INTRAVENOUS ONCE
Status: COMPLETED | OUTPATIENT
Start: 2023-04-09 | End: 2023-04-09

## 2023-04-09 RX ORDER — SODIUM CHLORIDE 0.9 % (FLUSH) 0.9 %
10 SYRINGE (ML) INJECTION AS NEEDED
Status: DISCONTINUED | OUTPATIENT
Start: 2023-04-09 | End: 2023-04-09 | Stop reason: HOSPADM

## 2023-04-09 RX ORDER — ACETAMINOPHEN 325 MG/1
975 TABLET ORAL ONCE
Status: COMPLETED | OUTPATIENT
Start: 2023-04-09 | End: 2023-04-09

## 2023-04-09 RX ADMIN — DIPHENHYDRAMINE HYDROCHLORIDE 25 MG: 50 INJECTION, SOLUTION INTRAMUSCULAR; INTRAVENOUS at 15:30

## 2023-04-09 RX ADMIN — ACETAMINOPHEN 975 MG: 325 TABLET, FILM COATED ORAL at 15:28

## 2023-04-09 RX ADMIN — KETOROLAC TROMETHAMINE 15 MG: 30 INJECTION, SOLUTION INTRAMUSCULAR; INTRAVENOUS at 15:28

## 2023-04-09 RX ADMIN — PROCHLORPERAZINE EDISYLATE 5 MG: 5 INJECTION INTRAMUSCULAR; INTRAVENOUS at 15:31

## 2023-04-09 NOTE — ED PROVIDER NOTES
Subjective   History of Present Illness  37-year-old female presents to the ED with a chief complaint of fever, sore throat, headache, concern for UTI.  Patient states that she has had urinary symptoms over the last few weeks despite being on antibiotics.  She also notes that she has a sore throat that is painful with swallowing.  Also has a fever.  No cough or wheeze.  No shortness of breath.  No nausea vomiting diarrhea or abdominal pain.  Also complains of a generalized headache        Review of Systems   Constitutional: Positive for fatigue and fever.   Musculoskeletal: Positive for myalgias.   All other systems reviewed and are negative.      Past Medical History:   Diagnosis Date   • ADD (attention deficit disorder)    • Anxiety    • Bipolar affective disorder, manic    • Body piercing     both ears   • Borderline personality disorder    • Chlamydia 2003   • Depression    • Ectopic pregnancy    • Fibroid    • Fibromyalgia syndrome    • Full dentures    • Hepatitis B    • Hepatitis C    • Hypothyroidism    • Lupus (systemic lupus erythematosus)    • Migraine headache    • Osteoarthritis    • Pancreatitis    • PMS (premenstrual syndrome)    • Preeclampsia    • RA (rheumatoid arthritis)    • Recurrent pregnancy loss, antepartum condition or complication    • Seizures     withdrawing from opiates/benzos   • Tachycardia    • Tattoos    • Trauma    • Varicella    • White matter abnormality on MRI of brain        Allergies   Allergen Reactions   • Iodine Hives   • Latex Hives   • Adhesive Tape Unknown - Low Severity     Blisters skin  Other reaction(s): Unknown - Low Severity  Blisters skin       Past Surgical History:   Procedure Laterality Date   • COLONOSCOPY N/A 3/1/2021    Procedure: COLONOSCOPY WITH BIOPSY AND POLYPECTOMY;  Surgeon: Rui Palacios MD;  Location: [facility] ENDOSCOPY;  Service: Gastroenterology;  Laterality: N/A;   • CYST REMOVAL  1991    back    • DILATATION AND CURETTAGE     • MOUTH  SURGERY      teeth removal   • TONSILLECTOMY AND ADENOIDECTOMY  2004   • TUBAL ABDOMINAL LIGATION  2004       Family History   Problem Relation Age of Onset   • Colon cancer Paternal Grandfather    • Cancer Paternal Grandfather    • Aneurysm Paternal Grandfather         brain   • Dementia Mother    • Stroke Mother    • Diabetes Mother    • COPD Mother    • Migraines Mother    • Alcohol abuse Mother    • Drug abuse Mother    • Rheum arthritis Father    • Lymphoma Father    • COPD Father    • Drug abuse Brother    • Diabetes Paternal Grandmother    • Pancreatitis Brother    • Diabetes Brother    • Drug abuse Brother    • Migraines Daughter    • Constipation Daughter    • Cirrhosis Neg Hx    • Liver cancer Neg Hx        Social History     Socioeconomic History   • Marital status:    Tobacco Use   • Smoking status: Former     Packs/day: 1.50     Years: 30.00     Pack years: 45.00     Types: Electronic Cigarette, Cigarettes     Start date: 1992     Quit date: 2020     Years since quitting: 3.2   • Smokeless tobacco: Never   • Tobacco comments:     Vape   Vaping Use   • Vaping Use: Every day   • Start date: 11/1/2020   • Substances: Nicotine, Flavoring   • Devices: Disposable, Refillable tank   Substance and Sexual Activity   • Alcohol use: Yes     Comment: social   • Drug use: Yes     Types: Heroin     Comment: 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     • Sexual activity: Yes     Partners: Male     Birth control/protection: None           Objective   Physical Exam  Vitals and nursing note reviewed.   Constitutional:       Appearance: Normal appearance.   HENT:      Head: Normocephalic and atraumatic.      Mouth/Throat:      Pharynx: Posterior oropharyngeal erythema present. No oropharyngeal exudate.      Comments: Minimal posterior oropharynx erythema, no exudate  Neck:      Comments: Negative Kernig's and Brudzinski's  Cardiovascular:      Rate and Rhythm: Tachycardia present.   Pulmonary:      Effort: Pulmonary  effort is normal.      Breath sounds: Normal breath sounds.   Abdominal:      Palpations: Abdomen is soft.      Tenderness: There is no abdominal tenderness.   Musculoskeletal:      Cervical back: Normal range of motion. No rigidity or tenderness.   Neurological:      General: No focal deficit present.      Mental Status: She is alert and oriented to person, place, and time.         Procedures           ED Course                                           MDM  Chief complaint: Headache, fever, sore throat    Initial impression of presenting illness: 37-year-old female presents to the ED with multiple complaints    Comorbidities requiring consideration and/or management: RA, lupus    Differential diagnosis includes but not limited to: Urinary tract infection, viral illness, strep pharyngitis, pneumonia, COVID, flu, other    Patient arrives hemodynamically stable, but febrile without respiratory distress with initial vitals interpreted by myself.  Pertinent initial vitals include temp 101.7, heart rate appropriately elevated at 121 bpm, /69    Pertinent features from physical exam:  Physical Exam  Vitals and nursing note reviewed.   Constitutional:       Appearance: Normal appearance.   HENT:      Head: Normocephalic and atraumatic.      Mouth/Throat:      Pharynx: Posterior oropharyngeal erythema present. No oropharyngeal exudate.      Comments: Minimal posterior oropharynx erythema, no exudate  Neck:      Comments: Negative Kernig's and Brudzinski's  Cardiovascular:      Rate and Rhythm: Tachycardia present.   Pulmonary:      Effort: Pulmonary effort is normal.      Breath sounds: Normal breath sounds.   Abdominal:      Palpations: Abdomen is soft.      Tenderness: There is no abdominal tenderness.   Musculoskeletal:      Cervical back: Normal range of motion. No rigidity or tenderness.   Neurological:      General: No focal deficit present.      Mental Status: She is alert and oriented to person, place, and  time.         Initial diagnostic plan: CBC, CMP, urinalysis, COVID/flu, strep, lactic acid, other    Results from initial plan were reviewed and interpreted by myself and include: CBC and CMP are reassuring, lactic acid is within normal limits, urinalysis does show 4+ bacteria and 3-5 WBCs, strep is negative,: Flu is negative.  COVID-negative      Diagnostic information from other sources includes: Review of previous visits, prior labs, prior imaging, available notes from prior evaluations or visits with specialists, medication list, allergies, past medical history, past surgical history    Interventions in the ED included: IV fluid rehydration, Toradol, Zofran, Compazine and Benadryl    Reevaluation: Resting comfortably, headache is improved.    Results/clinical rationale were discussed with patient and     Consultations and discussions of results with other physicians: N/A    Discussion of results/management/plan: Suspect fevers related to viral URI with sore throat congestion and headache.  Labs were reassuring.  Urinalysis does show infection she has no flank pain to indicate pyelonephritis.  No significant infection.  Will place on appropriate antibiotics for UTI.  Continue symptomatic treatment at home for upper URI.  Follow-up outpatient as needed.  Patient is agreeable to this plan.    Disposition plan: Discharge    Final diagnoses:   Acute cystitis without hematuria   Viral URI       ED Disposition  ED Disposition     ED Disposition   Discharge    Condition   Stable    Comment   --             No follow-up provider specified.       Medication List      New Prescriptions    cefuroxime 500 MG tablet  Commonly known as: CEFTIN  Take 1 tablet by mouth 2 (Two) Times a Day for 7 days.           Where to Get Your Medications      These medications were sent to [facility] [place], [place] - [address] [phone] [facility] [phone]   [address] [facility] [address]    Phone:  [phone]   · cefuroxime 500 MG tablet          Mathew Steele, DO  04/09/23 1826

## 2023-04-12 LAB — BACTERIA SPEC AEROBE CULT: NORMAL

## 2023-04-14 LAB
BACTERIA SPEC AEROBE CULT: NORMAL
BACTERIA SPEC AEROBE CULT: NORMAL

## 2023-05-16 DIAGNOSIS — K59.04 CHRONIC IDIOPATHIC CONSTIPATION: ICD-10-CM

## 2023-05-16 RX ORDER — PLECANATIDE 3 MG/1
1 TABLET ORAL DAILY
Qty: 30 TABLET | Refills: 1 | Status: SHIPPED | OUTPATIENT
Start: 2023-05-16

## 2023-06-21 PROBLEM — E66.811 CLASS 1 OBESITY DUE TO EXCESS CALORIES WITH SERIOUS COMORBIDITY AND BODY MASS INDEX (BMI) OF 31.0 TO 31.9 IN ADULT: Status: ACTIVE | Noted: 2023-06-21

## 2023-06-21 PROBLEM — T40.2X5A THERAPEUTIC OPIOID INDUCED CONSTIPATION: Chronic | Status: ACTIVE | Noted: 2023-06-21

## 2023-06-21 PROBLEM — Z86.19 HISTORY OF HEPATITIS C: Status: ACTIVE | Noted: 2023-06-21

## 2023-06-21 PROBLEM — R11.0 NAUSEA: Chronic | Status: ACTIVE | Noted: 2023-06-21

## 2023-06-21 PROBLEM — K59.04 CHRONIC IDIOPATHIC CONSTIPATION: Chronic | Status: ACTIVE | Noted: 2023-06-21

## 2023-06-21 PROBLEM — K21.9 GASTROESOPHAGEAL REFLUX DISEASE WITHOUT ESOPHAGITIS: Status: ACTIVE | Noted: 2023-06-21

## 2023-06-21 PROBLEM — R10.30 LOWER ABDOMINAL PAIN: Status: ACTIVE | Noted: 2023-06-21

## 2023-06-21 PROBLEM — E66.09 CLASS 1 OBESITY DUE TO EXCESS CALORIES WITH SERIOUS COMORBIDITY AND BODY MASS INDEX (BMI) OF 31.0 TO 31.9 IN ADULT: Status: ACTIVE | Noted: 2023-06-21

## 2023-06-21 PROBLEM — K59.00 CONSTIPATION: Status: ACTIVE | Noted: 2023-06-21

## 2023-06-21 PROBLEM — K59.03 THERAPEUTIC OPIOID INDUCED CONSTIPATION: Chronic | Status: ACTIVE | Noted: 2023-06-21

## 2023-07-24 ENCOUNTER — TELEPHONE (OUTPATIENT)
Dept: OBSTETRICS AND GYNECOLOGY | Facility: CLINIC | Age: 38
End: 2023-07-24
Payer: COMMERCIAL

## 2023-07-24 NOTE — TELEPHONE ENCOUNTER
Patient informed. She will schedule RTO for endometrial biopsy        ----- Message from Rhianna Brambila MD sent at 7/24/2023  7:11 AM EDT -----  Yes that would be best if patient is in agreement.  Thank you.  ----- Message -----  From: Tammy Malcolm PA-C  Sent: 7/20/2023   6:14 PM EDT  To: MD Dr. Patty Aguiar patient was requesting to have endometrial ablation done when seen at recent visit .  Would you recommend she have endometrial biopsy prior to D&C/endometrial ablation? Thank you

## 2023-07-25 ENCOUNTER — OFFICE VISIT (OUTPATIENT)
Dept: OBSTETRICS AND GYNECOLOGY | Facility: CLINIC | Age: 38
End: 2023-07-25
Payer: COMMERCIAL

## 2023-07-25 VITALS
DIASTOLIC BLOOD PRESSURE: 76 MMHG | BODY MASS INDEX: 30.42 KG/M2 | WEIGHT: 141 LBS | HEIGHT: 57 IN | SYSTOLIC BLOOD PRESSURE: 110 MMHG

## 2023-07-25 DIAGNOSIS — N93.9 ABNORMAL UTERINE BLEEDING (AUB): Primary | ICD-10-CM

## 2023-07-25 LAB
B-HCG UR QL: NEGATIVE
EXPIRATION DATE: NORMAL
INTERNAL NEGATIVE CONTROL: NEGATIVE
INTERNAL POSITIVE CONTROL: POSITIVE
Lab: NORMAL

## 2023-07-25 NOTE — PROGRESS NOTES
Subjective   Chief Complaint   Patient presents with    Follow-up     Endometrial biopsy, Negative UPT       Alicia Atkinson is a 38 y.o. year old  presenting to be seen for endometrial biopsy.  Seen recently for heavy and irregular menses and patient was requesting endometrial ablation  Pelvic ultrasound has been done which is reviewed. Endometrium was 8 mm and a 2 cm intramural fibroid was noted  MD had been consulted and recommended endometrial biopsy be done prior to ablation      Past Medical History:   Diagnosis Date    ADD (attention deficit disorder)     Anxiety     Bipolar affective disorder, manic     Body piercing     both ears    Borderline personality disorder     Chlamydia 2003    Depression     Ectopic pregnancy     Fibroid     Fibromyalgia syndrome     Full dentures     Hepatitis B     Hepatitis C     Hypothyroidism     Lupus (systemic lupus erythematosus)     Migraine headache     Osteoarthritis     Pancreatitis     PMS (premenstrual syndrome)     Preeclampsia     RA (rheumatoid arthritis)     Recurrent pregnancy loss, antepartum condition or complication     Seizures     withdrawing from opiates/benzos    Tachycardia     Tattoos     Trauma     Varicella     White matter abnormality on MRI of brain         Current Outpatient Medications:     baclofen (LIORESAL) 20 MG tablet, Take 1 tablet by mouth At Night As Needed for Muscle Spasms., Disp: , Rfl:     bisacodyl (DULCOLAX) 5 MG EC tablet, Take 1 tablet by mouth Daily As Needed for Constipation., Disp: , Rfl:     buprenorphine-naloxone (SUBOXONE) 8-2 MG per SL tablet, Place 1.25 tablets under the tongue Daily., Disp: , Rfl:     cyanocobalamin 1000 MCG/ML injection, Inject 1 mL under the skin into the appropriate area as directed Every 30 (Thirty) Days., Disp: 1 mL, Rfl: 3    Enbrel Mini 50 MG/ML solution cartridge, , Disp: , Rfl:     fluorometholone (FML) 0.1 % ophthalmic suspension, Instill ONE drop IN EACH EYE AT BEDTIME, Disp: , Rfl:     folic  acid (FOLVITE) 1 MG tablet, Take 1 tablet by mouth Daily., Disp: , Rfl:     furosemide (LASIX) 20 MG tablet, TAKE ONE TABLET BY MOUTH EVERY DAY, Disp: 90 tablet, Rfl: 1    gabapentin (NEURONTIN) 800 MG tablet, Take 1 tablet by mouth 3 (Three) Times a Day As Needed., Disp: , Rfl: 3    Ibuprofen-Acetaminophen 125-250 MG tablet, Take  by mouth., Disp: , Rfl:     leflunomide (ARAVA) 20 MG tablet, Take 1 tablet by mouth Daily., Disp: , Rfl:     loratadine (CLARITIN) 10 MG tablet, Take 1 tablet by mouth Daily., Disp: , Rfl:     Mounjaro 5 MG/0.5ML solution pen-injector, INJECT 0.5 MILLILITERS SUBCUTANEOUSLY ONCE A WEEK FOR control of blood sugar, Disp: , Rfl:     Mounjaro 7.5 MG/0.5ML solution pen-injector, Inject as directed subcutaneously once a week., Disp: , Rfl:     Multiple Vitamins-Minerals (MULTIVITAMIN ADULT EXTRA C PO), Take 1 tablet by mouth., Disp: , Rfl:     Naloxegol Oxalate (Movantik) 25 MG tablet, Take 1 tablet by mouth Every Morning., Disp: 30 tablet, Rfl: 3    omeprazole (priLOSEC) 40 MG capsule, Take 1 capsule by mouth Daily., Disp: 30 capsule, Rfl: 5    ondansetron ODT (ZOFRAN-ODT) 4 MG disintegrating tablet, Place 1 tablet on the tongue Every 8 (Eight) Hours As Needed for Nausea or Vomiting., Disp: 9 tablet, Rfl: 0    Plecanatide (Trulance) 3 MG tablet, Take 1 tablet by mouth Daily. Needs office visit for further refills. (Patient taking differently: Take 1 tablet by mouth Daily.), Disp: 30 tablet, Rfl: 3    Sennosides (Chocolated Laxative) 15 MG chewable tablet, Chew. 4 squares per week, Disp: , Rfl:     Thyroid 30 MG PO tablet, Take 1 tablet by mouth Daily., Disp: , Rfl:     triamcinolone (KENALOG) 0.1 % cream, APPLY TO THE AFFECTED AREA(S) (TO RASH) TWICE DAILY, Disp: , Rfl:    Allergies   Allergen Reactions    Iodine Hives    Latex Hives    Adhesive Tape Unknown - Low Severity     Blisters skin  Other reaction(s): Unknown - Low Severity  Blisters skin      Past Surgical History:   Procedure  Laterality Date    COLONOSCOPY N/A 3/1/2021    Procedure: COLONOSCOPY WITH BIOPSY AND POLYPECTOMY;  Surgeon: Rui Palacios MD;  Location: [facility] ENDOSCOPY;  Service: Gastroenterology;  Laterality: N/A;    CYST REMOVAL  1991    back     DILATATION AND CURETTAGE      MOUTH SURGERY      teeth removal    TONSILLECTOMY AND ADENOIDECTOMY  2004    TUBAL ABDOMINAL LIGATION  2004      Social History     Socioeconomic History    Marital status:    Tobacco Use    Smoking status: Former     Packs/day: 1.50     Years: 30.00     Pack years: 45.00     Types: Electronic Cigarette, Cigarettes     Start date: 1992     Quit date: 2020     Years since quitting: 3.5    Smokeless tobacco: Never    Tobacco comments:     Vape   Vaping Use    Vaping Use: Every day    Start date: 11/1/2020    Substances: Nicotine, Flavoring    Devices: Disposable, Refillable tank   Substance and Sexual Activity    Alcohol use: Yes     Comment: social    Drug use: Yes     Types: Heroin     Comment: 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      Sexual activity: Yes     Partners: Male     Birth control/protection: None      Family History   Problem Relation Age of Onset    Colon cancer Paternal Grandfather     Cancer Paternal Grandfather     Aneurysm Paternal Grandfather         brain    Dementia Mother     Stroke Mother     Diabetes Mother     COPD Mother     Migraines Mother     Alcohol abuse Mother     Drug abuse Mother     Rheum arthritis Father     Lymphoma Father     COPD Father     Drug abuse Brother     Diabetes Paternal Grandmother     Pancreatitis Brother     Diabetes Brother     Drug abuse Brother     Migraines Daughter     Constipation Daughter     Cirrhosis Neg Hx     Liver cancer Neg Hx        Review of Systems   Constitutional:  Negative for chills, diaphoresis and fever.   Gastrointestinal: Negative.    Genitourinary:  Positive for menstrual problem. Negative for difficulty urinating and dysuria.         Objective   /76   Ht  "144.8 cm (57\")   Wt 64 kg (141 lb)   LMP 07/16/2023 (Exact Date)   BMI 30.51 kg/m²     Physical Exam  Constitutional:       Appearance: Normal appearance. She is well-developed and well-groomed.   Eyes:      General: Lids are normal.      Extraocular Movements: Extraocular movements intact.      Conjunctiva/sclera: Conjunctivae normal.   Genitourinary:     Labia:         Right: No rash, tenderness or lesion.         Left: No rash, tenderness or lesion.       Urethra: No prolapse, urethral pain, urethral swelling or urethral lesion.      Vagina: No vaginal discharge, tenderness or lesions.      Cervix: No cervical motion tenderness, discharge, friability or lesion.   Neurological:      Mental Status: She is alert.   Psychiatric:         Attention and Perception: Attention normal.         Mood and Affect: Mood normal.         Speech: Speech normal.         Behavior: Behavior is cooperative.          Result Review :           US Non-ob Transvaginal (07/20/2023 12:07)         Assessment and Plan  Diagnoses and all orders for this visit:    1. Abnormal uterine bleeding (AUB) (Primary)  -     POC Pregnancy, Urine  -     TISSUE EXAM, [facility] ([facility])      Patient Instructions   Will contact with results of endometrial biopsy when available and if negative proceed with D&C, hysteroscopy, Novasure ablation            This note was electronically signed.    Tammy Malcolm PA-C   July 25, 2023  "

## 2023-07-25 NOTE — PROGRESS NOTES
Endometrial Biopsy    Date of procedure:  7/25/2023    Indication:                                     abnormal uterine bleeding    Procedure documentation:    After informed consent obtained and all questions answered, the patient was placed in lithotomy position.  The cervix was grasped anterior at the 12 o'clock position.  The cavity sounded to 7 centimeters.  An endometrial biopsy specimen was obtained with multiple passes.  The tissue was sent for permanent histopathologic evaluation.  Tenaculum was removed from the cervix with scant bleeding. Patient tolerated the procedure well. EBL minimal.    Post procedure instructions: She was instructed to call us in 1 week's time if she has not heard from us otherwise.  If there is any significant pelvic pain,  fever,  or excessive bleeding she is to call immediately.    Follow up  prn    This note was electronically signed.    Tammy Malcolm PA-C  July 25, 2023

## 2023-07-25 NOTE — PATIENT INSTRUCTIONS
Will contact with results of endometrial biopsy when available and if negative proceed with D&C, hysteroscopy, Novasure ablation

## 2023-07-26 ENCOUNTER — PREP FOR SURGERY (OUTPATIENT)
Dept: OTHER | Facility: HOSPITAL | Age: 38
End: 2023-07-26
Payer: COMMERCIAL

## 2023-07-26 DIAGNOSIS — N93.9 ABNORMAL UTERINE BLEEDING (AUB): Primary | ICD-10-CM

## 2023-07-26 LAB — REF LAB TEST METHOD: NORMAL

## 2023-07-26 RX ORDER — SODIUM CHLORIDE 0.9 % (FLUSH) 0.9 %
10 SYRINGE (ML) INJECTION AS NEEDED
Status: CANCELLED | OUTPATIENT
Start: 2023-07-26

## 2023-07-26 RX ORDER — SODIUM CHLORIDE 0.9 % (FLUSH) 0.9 %
3 SYRINGE (ML) INJECTION EVERY 12 HOURS SCHEDULED
Status: CANCELLED | OUTPATIENT
Start: 2023-07-26

## 2023-07-26 RX ORDER — SODIUM CHLORIDE 9 MG/ML
40 INJECTION, SOLUTION INTRAVENOUS AS NEEDED
Status: CANCELLED | OUTPATIENT
Start: 2023-07-26

## 2023-07-26 NOTE — PROGRESS NOTES
Please inform patient her endometrial biopsy has returned negative and I have placed case request for D&C, hysteroscopy and NovaSure endometrial ablation.

## 2023-07-27 PROBLEM — N93.9 ABNORMAL UTERINE BLEEDING (AUB): Status: ACTIVE | Noted: 2023-07-27

## 2023-07-28 ENCOUNTER — HOSPITAL ENCOUNTER (OUTPATIENT)
Dept: CARDIOLOGY | Facility: HOSPITAL | Age: 38
Discharge: HOME | End: 2023-07-28
Payer: COMMERCIAL

## 2023-07-28 LAB
ANION GAP SERPL CALCULATED.3IONS-SCNC: 12 MMOL/L (ref 5–15)
B-HCG UR QL: NEGATIVE
BACTERIA UR QL AUTO: ABNORMAL /HPF
BILIRUB UR QL STRIP: NEGATIVE
BUN SERPL-MCNC: 15 MG/DL (ref 6–20)
BUN/CREAT SERPL: 23.8 (ref 7–25)
CALCIUM SPEC-SCNC: 9.1 MG/DL (ref 8.6–10.5)
CHLORIDE SERPL-SCNC: 102 MMOL/L (ref 98–107)
CLARITY UR: CLEAR
CO2 SERPL-SCNC: 25 MMOL/L (ref 22–29)
COLOR UR: YELLOW
CREAT SERPL-MCNC: 0.63 MG/DL (ref 0.57–1)
DEPRECATED RDW RBC AUTO: 42.9 FL (ref 37–54)
EGFRCR SERPLBLD CKD-EPI 2021: 115.2 ML/MIN/1.73
EOSINOPHIL # BLD MANUAL: 0.24 10*3/MM3 (ref 0–0.4)
EOSINOPHIL NFR BLD MANUAL: 6 % (ref 0.3–6.2)
ERYTHROCYTE [DISTWIDTH] IN BLOOD BY AUTOMATED COUNT: 12.4 % (ref 12.3–15.4)
GLUCOSE SERPL-MCNC: 78 MG/DL (ref 65–99)
GLUCOSE UR STRIP-MCNC: NEGATIVE MG/DL
HCT VFR BLD AUTO: 40.8 % (ref 34–46.6)
HGB BLD-MCNC: 14 G/DL (ref 12–15.9)
HGB UR QL STRIP.AUTO: ABNORMAL
HYALINE CASTS UR QL AUTO: ABNORMAL /LPF
HYPOCHROMIA BLD QL: ABNORMAL
KETONES UR QL STRIP: ABNORMAL
LEUKOCYTE ESTERASE UR QL STRIP.AUTO: NEGATIVE
LYMPHOCYTES # BLD MANUAL: 2.52 10*3/MM3 (ref 0.7–3.1)
LYMPHOCYTES NFR BLD MANUAL: 17 % (ref 5–12)
MCH RBC QN AUTO: 32.2 PG (ref 26.6–33)
MCHC RBC AUTO-ENTMCNC: 34.3 G/DL (ref 31.5–35.7)
MCV RBC AUTO: 93.8 FL (ref 79–97)
MONOCYTES # BLD: 0.68 10*3/MM3 (ref 0.1–0.9)
MUCOUS THREADS URNS QL MICRO: ABNORMAL /HPF
NEUTROPHILS # BLD AUTO: 0.56 10*3/MM3 (ref 1.7–7)
NEUTROPHILS NFR BLD MANUAL: 14 % (ref 42.7–76)
NITRITE UR QL STRIP: NEGATIVE
PH UR STRIP.AUTO: 5.5 [PH] (ref 5–8)
PLATELET # BLD AUTO: 145 10*3/MM3 (ref 140–450)
PMV BLD AUTO: 10.1 FL (ref 6–12)
POTASSIUM SERPL-SCNC: 4.4 MMOL/L (ref 3.5–5.2)
PROT UR QL STRIP: ABNORMAL
RBC # BLD AUTO: 4.35 10*6/MM3 (ref 3.77–5.28)
RBC # UR STRIP: ABNORMAL /HPF
REF LAB TEST METHOD: ABNORMAL
SCAN SLIDE: NORMAL
SMALL PLATELETS BLD QL SMEAR: ADEQUATE
SODIUM SERPL-SCNC: 139 MMOL/L (ref 136–145)
SP GR UR STRIP: 1.03 (ref 1–1.03)
SQUAMOUS #/AREA URNS HPF: ABNORMAL /HPF
UROBILINOGEN UR QL STRIP: ABNORMAL
VARIANT LYMPHS NFR BLD MANUAL: 23 % (ref 0–5)
VARIANT LYMPHS NFR BLD MANUAL: 40 % (ref 19.6–45.3)
WBC # UR STRIP: ABNORMAL /HPF
WBC MORPH BLD: NORMAL
WBC NRBC COR # BLD: 4 10*3/MM3 (ref 3.4–10.8)

## 2023-07-28 PROCEDURE — 85007 BL SMEAR W/DIFF WBC COUNT: CPT | Performed by: PHYSICIAN ASSISTANT

## 2023-07-28 PROCEDURE — 80048 BASIC METABOLIC PNL TOTAL CA: CPT | Performed by: OBSTETRICS & GYNECOLOGY

## 2023-07-28 PROCEDURE — 85025 COMPLETE CBC W/AUTO DIFF WBC: CPT | Performed by: PHYSICIAN ASSISTANT

## 2023-07-28 PROCEDURE — 81001 URINALYSIS AUTO W/SCOPE: CPT | Performed by: PHYSICIAN ASSISTANT

## 2023-07-28 PROCEDURE — 93005 ELECTROCARDIOGRAM TRACING: CPT | Performed by: OBSTETRICS & GYNECOLOGY

## 2023-07-28 PROCEDURE — 36415 COLL VENOUS BLD VENIPUNCTURE: CPT | Performed by: OBSTETRICS & GYNECOLOGY

## 2023-07-28 PROCEDURE — 81025 URINE PREGNANCY TEST: CPT | Performed by: OBSTETRICS & GYNECOLOGY

## 2023-07-28 NOTE — PAT
Called anesthesia phone and spoke with Lawrence Rodriguez CRNA.  Pt is having a procedure on 7/31/23 with Dr. Brambila.  Informed Lawrence that pt is on suboxone daily, and pt inquired about taking it or not DOS.  Lawrence stated that this is ok for pt to take.  Informed pt.

## 2023-07-28 NOTE — PRE-PROCEDURE INSTRUCTIONS
PAT phone history completed with pt for upcoming procedure on 7/31/23 with Dr. Brambila.      PAT PASS GIVEN/REVIEWED WITH PT.  VERBALIZED UNDERSTANDING OF THE FOLLOWING:  DO NOT EAT, DRINK, SMOKE, USE SMOKELESS TOBACCO OR CHEW GUM AFTER MIDNIGHT THE NIGHT BEFORE SURGERY.  THIS ALSO INCLUDES HARD CANDIES AND MINTS.    DO NOT SHAVE THE AREA TO BE OPERATED ON AT LEAST 48 HOURS PRIOR TO THE PROCEDURE.  DO NOT WEAR MAKE UP OR NAIL POLISH.  DO NOT LEAVE IN ANY PIERCING OR WEAR JEWELRY THE DAY OF SURGERY.      DO NOT USE ADHESIVES IF YOU WEAR DENTURES.    DO NOT WEAR EYE CONTACTS; BRING IN YOUR GLASSES.    ONLY TAKE MEDICATION THE MORNING OF YOUR PROCEDURE IF INSTRUCTED BY YOUR SURGEON WITH ENOUGH WATER TO SWALLOW THE MEDICATION.  IF YOUR SURGEON DID NOT SPECIFY WHICH MEDICATIONS TO TAKE, YOU WILL NEED TO CALL THEIR OFFICE FOR FURTHER INSTRUCTIONS AND DO AS THEY INSTRUCT.    LEAVE ANYTHING YOU CONSIDER VALUABLE AT HOME.    YOU WILL NEED TO ARRANGE FOR SOMEONE TO DRIVE YOU HOME AFTER SURGERY.  IT IS RECOMMENDED THAT YOU DO NOT DRIVE, WORK, DRINK ALCOHOL OR MAKE MAJOR DECISIONS FOR AT LEAST 24 HOURS AFTER YOUR PROCEDURE IS COMPLETE.      THE DAY OF YOUR PROCEDURE, BRING IN THE FOLLOWING IF APPLICABLE:   PICTURE ID AND INSURANCE/MEDICARE OR MEDICAID CARDS/ANY CO-PAY THAT MAY BE DUE   COPY OF ADVANCED DIRECTIVE/LIVING WILL/POWER OR    CPAP/BIPAP/INHALERS   SKIN PREP SHEET   YOUR PREADMISSION TESTING PASS (IF NOT A PHONE HISTORY)

## 2023-07-31 ENCOUNTER — ANESTHESIA (OUTPATIENT)
Dept: PERIOP | Facility: HOSPITAL | Age: 38
End: 2023-07-31
Payer: COMMERCIAL

## 2023-07-31 ENCOUNTER — HOSPITAL ENCOUNTER (OUTPATIENT)
Facility: HOSPITAL | Age: 38
Setting detail: HOSPITAL OUTPATIENT SURGERY
Discharge: HOME | End: 2023-07-31
Attending: OBSTETRICS & GYNECOLOGY | Admitting: OBSTETRICS & GYNECOLOGY
Payer: COMMERCIAL

## 2023-07-31 ENCOUNTER — ANESTHESIA EVENT (OUTPATIENT)
Dept: PERIOP | Facility: HOSPITAL | Age: 38
End: 2023-07-31
Payer: COMMERCIAL

## 2023-07-31 VITALS
TEMPERATURE: 97.1 F | RESPIRATION RATE: 17 BRPM | DIASTOLIC BLOOD PRESSURE: 79 MMHG | HEIGHT: 58 IN | HEART RATE: 72 BPM | OXYGEN SATURATION: 98 % | SYSTOLIC BLOOD PRESSURE: 121 MMHG | WEIGHT: 141 LBS | BODY MASS INDEX: 29.6 KG/M2

## 2023-07-31 DIAGNOSIS — N93.9 ABNORMAL UTERINE BLEEDING (AUB): ICD-10-CM

## 2023-07-31 LAB
QT INTERVAL: 388 MS
QTC INTERVAL: 439 MS

## 2023-07-31 PROCEDURE — 0 LIDOCAINE 1 % SOLUTION: Performed by: OBSTETRICS & GYNECOLOGY

## 2023-07-31 PROCEDURE — 25010000002 FENTANYL CITRATE (PF) 50 MCG/ML SOLUTION: Performed by: NURSE ANESTHETIST, CERTIFIED REGISTERED

## 2023-07-31 PROCEDURE — 58563 HYSTEROSCOPY ABLATION: CPT | Performed by: OBSTETRICS & GYNECOLOGY

## 2023-07-31 PROCEDURE — S0260 H&P FOR SURGERY: HCPCS | Performed by: OBSTETRICS & GYNECOLOGY

## 2023-07-31 PROCEDURE — 25010000002 ONDANSETRON PER 1 MG: Performed by: NURSE ANESTHETIST, CERTIFIED REGISTERED

## 2023-07-31 PROCEDURE — 25010000002 DEXAMETHASONE PER 1 MG: Performed by: NURSE ANESTHETIST, CERTIFIED REGISTERED

## 2023-07-31 PROCEDURE — 25010000002 MIDAZOLAM PER 1MG: Performed by: NURSE ANESTHETIST, CERTIFIED REGISTERED

## 2023-07-31 PROCEDURE — 25010000002 PROPOFOL 10 MG/ML EMULSION: Performed by: NURSE ANESTHETIST, CERTIFIED REGISTERED

## 2023-07-31 PROCEDURE — 25010000002 KETOROLAC TROMETHAMINE PER 15 MG: Performed by: NURSE ANESTHETIST, CERTIFIED REGISTERED

## 2023-07-31 RX ORDER — IBUPROFEN 800 MG/1
800 TABLET ORAL EVERY 8 HOURS PRN
Qty: 30 TABLET | Refills: 0 | Status: SHIPPED | OUTPATIENT
Start: 2023-07-31

## 2023-07-31 RX ORDER — SODIUM CHLORIDE 0.9 % (FLUSH) 0.9 %
3 SYRINGE (ML) INJECTION EVERY 12 HOURS SCHEDULED
Status: DISCONTINUED | OUTPATIENT
Start: 2023-07-31 | End: 2023-07-31 | Stop reason: HOSPADM

## 2023-07-31 RX ORDER — PROMETHAZINE HYDROCHLORIDE 25 MG/1
12.5 TABLET ORAL ONCE AS NEEDED
Status: DISCONTINUED | OUTPATIENT
Start: 2023-07-31 | End: 2023-07-31 | Stop reason: HOSPADM

## 2023-07-31 RX ORDER — KETOROLAC TROMETHAMINE 30 MG/ML
INJECTION, SOLUTION INTRAMUSCULAR; INTRAVENOUS AS NEEDED
Status: DISCONTINUED | OUTPATIENT
Start: 2023-07-31 | End: 2023-07-31 | Stop reason: SURG

## 2023-07-31 RX ORDER — DEXAMETHASONE SODIUM PHOSPHATE 4 MG/ML
INJECTION, SOLUTION INTRA-ARTICULAR; INTRALESIONAL; INTRAMUSCULAR; INTRAVENOUS; SOFT TISSUE AS NEEDED
Status: DISCONTINUED | OUTPATIENT
Start: 2023-07-31 | End: 2023-07-31 | Stop reason: SURG

## 2023-07-31 RX ORDER — ONDANSETRON HYDROCHLORIDE 8 MG/1
8 TABLET, FILM COATED ORAL EVERY 8 HOURS PRN
Qty: 15 TABLET | Refills: 0 | Status: SHIPPED | OUTPATIENT
Start: 2023-07-31

## 2023-07-31 RX ORDER — ACETAMINOPHEN 500 MG
1000 TABLET ORAL EVERY 8 HOURS PRN
Qty: 60 TABLET | Refills: 0 | Status: SHIPPED | OUTPATIENT
Start: 2023-07-31

## 2023-07-31 RX ORDER — SCOLOPAMINE TRANSDERMAL SYSTEM 1 MG/1
1 PATCH, EXTENDED RELEASE TRANSDERMAL ONCE
Status: DISCONTINUED | OUTPATIENT
Start: 2023-07-31 | End: 2023-07-31 | Stop reason: HOSPADM

## 2023-07-31 RX ORDER — FENTANYL CITRATE 50 UG/ML
INJECTION, SOLUTION INTRAMUSCULAR; INTRAVENOUS AS NEEDED
Status: DISCONTINUED | OUTPATIENT
Start: 2023-07-31 | End: 2023-07-31 | Stop reason: SURG

## 2023-07-31 RX ORDER — LIDOCAINE HYDROCHLORIDE 10 MG/ML
INJECTION, SOLUTION INFILTRATION; PERINEURAL AS NEEDED
Status: DISCONTINUED | OUTPATIENT
Start: 2023-07-31 | End: 2023-07-31 | Stop reason: HOSPADM

## 2023-07-31 RX ORDER — LIDOCAINE HYDROCHLORIDE 20 MG/ML
INJECTION, SOLUTION INTRAVENOUS AS NEEDED
Status: DISCONTINUED | OUTPATIENT
Start: 2023-07-31 | End: 2023-07-31 | Stop reason: SURG

## 2023-07-31 RX ORDER — ONDANSETRON 2 MG/ML
INJECTION INTRAMUSCULAR; INTRAVENOUS AS NEEDED
Status: DISCONTINUED | OUTPATIENT
Start: 2023-07-31 | End: 2023-07-31 | Stop reason: SURG

## 2023-07-31 RX ORDER — ONDANSETRON 4 MG/1
4 TABLET, FILM COATED ORAL ONCE AS NEEDED
Status: DISCONTINUED | OUTPATIENT
Start: 2023-07-31 | End: 2023-07-31 | Stop reason: HOSPADM

## 2023-07-31 RX ORDER — ONDANSETRON 2 MG/ML
4 INJECTION INTRAMUSCULAR; INTRAVENOUS ONCE AS NEEDED
Status: DISCONTINUED | OUTPATIENT
Start: 2023-07-31 | End: 2023-07-31 | Stop reason: HOSPADM

## 2023-07-31 RX ORDER — MIDAZOLAM HYDROCHLORIDE 2 MG/2ML
INJECTION, SOLUTION INTRAMUSCULAR; INTRAVENOUS AS NEEDED
Status: DISCONTINUED | OUTPATIENT
Start: 2023-07-31 | End: 2023-07-31 | Stop reason: SURG

## 2023-07-31 RX ORDER — SODIUM CHLORIDE 9 MG/ML
40 INJECTION, SOLUTION INTRAVENOUS AS NEEDED
Status: DISCONTINUED | OUTPATIENT
Start: 2023-07-31 | End: 2023-07-31 | Stop reason: HOSPADM

## 2023-07-31 RX ORDER — SODIUM CHLORIDE, SODIUM LACTATE, POTASSIUM CHLORIDE, CALCIUM CHLORIDE 600; 310; 30; 20 MG/100ML; MG/100ML; MG/100ML; MG/100ML
1000 INJECTION, SOLUTION INTRAVENOUS CONTINUOUS
Status: DISCONTINUED | OUTPATIENT
Start: 2023-07-31 | End: 2023-07-31 | Stop reason: HOSPADM

## 2023-07-31 RX ORDER — SODIUM CHLORIDE 0.9 % (FLUSH) 0.9 %
10 SYRINGE (ML) INJECTION AS NEEDED
Status: DISCONTINUED | OUTPATIENT
Start: 2023-07-31 | End: 2023-07-31 | Stop reason: HOSPADM

## 2023-07-31 RX ADMIN — FENTANYL CITRATE 100 MCG: 50 INJECTION, SOLUTION INTRAMUSCULAR; INTRAVENOUS at 12:59

## 2023-07-31 RX ADMIN — LIDOCAINE HYDROCHLORIDE 60 MG: 20 INJECTION, SOLUTION INTRAVENOUS at 12:59

## 2023-07-31 RX ADMIN — PROPOFOL 100 MCG/KG/MIN: 10 INJECTION, EMULSION INTRAVENOUS at 12:59

## 2023-07-31 RX ADMIN — SODIUM CHLORIDE, POTASSIUM CHLORIDE, SODIUM LACTATE AND CALCIUM CHLORIDE 1000 ML: 600; 310; 30; 20 INJECTION, SOLUTION INTRAVENOUS at 11:07

## 2023-07-31 RX ADMIN — KETOROLAC TROMETHAMINE 30 MG: 30 INJECTION, SOLUTION INTRAMUSCULAR at 13:10

## 2023-07-31 RX ADMIN — ONDANSETRON 4 MG: 2 INJECTION INTRAMUSCULAR; INTRAVENOUS at 12:59

## 2023-07-31 RX ADMIN — SCOLOPAMINE TRANSDERMAL SYSTEM 1 PATCH: 1 PATCH, EXTENDED RELEASE TRANSDERMAL at 11:28

## 2023-07-31 RX ADMIN — DEXAMETHASONE SODIUM PHOSPHATE 4 MG: 4 INJECTION, SOLUTION INTRA-ARTICULAR; INTRALESIONAL; INTRAMUSCULAR; INTRAVENOUS; SOFT TISSUE at 12:59

## 2023-07-31 RX ADMIN — MIDAZOLAM HYDROCHLORIDE 2 MG: 1 INJECTION, SOLUTION INTRAMUSCULAR; INTRAVENOUS at 12:59

## 2023-07-31 NOTE — H&P
Robel Atkinson  : 1985  MRN: [id number]  CSN: [id number]    History and Physical  Subjective   Alicia Atkinson is a 38 y.o. year old  who presents for surgery due to menorrhagia with irregular cycles.  Patient had been seen in the office.  She was evaluated by physicians assistant.  She had a transvaginal ultrasound showing a small intramural fibroid but otherwise unremarkable.  She is desiring treatment with a uterine ablation.  She had an endometrial biopsy which returned with proliferative endometrium.  She is here for D&C with diagnostic hysteroscopy and NovaSure ablation.    History  Past Medical History:   Diagnosis Date    ADD (attention deficit disorder)     Anxiety     Body piercing     both ears    Borderline personality disorder     Chlamydia 2003    Depression     Ectopic pregnancy     Fibroid     Fibromyalgia syndrome     Full dentures     Hepatitis C     hx of.  Negative after treatment.    Hypothyroidism     Lupus (systemic lupus erythematosus)     Migraine headache     Osteoarthritis     Pancreatitis     PMS (premenstrual syndrome)     Preeclampsia     RA (rheumatoid arthritis)     Recurrent pregnancy loss, antepartum condition or complication     Seizures     withdrawing from opiates/benzos    Tachycardia     Tattoos     Trauma     Varicella     White matter abnormality on MRI of brain      No current facility-administered medications on file prior to encounter.     Current Outpatient Medications on File Prior to Encounter   Medication Sig Dispense Refill    baclofen (LIORESAL) 20 MG tablet Take 1 tablet by mouth At Night As Needed for Muscle Spasms.      buprenorphine-naloxone (SUBOXONE) 8-2 MG per SL tablet Place 0.25 tablets under the tongue Daily.      cyanocobalamin 1000 MCG/ML injection Inject 1 mL under the skin into the appropriate area as directed Every 30 (Thirty) Days. 1 mL 3    Enbrel Mini 50 MG/ML solution cartridge 1 (One) Time Per Week.      fluorometholone  (FML) 0.1 % ophthalmic suspension Instill ONE drop IN EACH EYE AT BEDTIME      folic acid (FOLVITE) 1 MG tablet Take 1 tablet by mouth Daily.      furosemide (LASIX) 20 MG tablet TAKE ONE TABLET BY MOUTH EVERY DAY (Patient taking differently: Has been taking PRN (if she gets her fluid intake)) 90 tablet 1    gabapentin (NEURONTIN) 800 MG tablet Take 1 tablet by mouth 2 (Two) Times a Day.  3    Ibuprofen-Acetaminophen 125-250 MG tablet Take  by mouth.      loratadine (CLARITIN) 10 MG tablet Take 1 tablet by mouth Daily.      Mounjaro 5 MG/0.5ML solution pen-injector Last dose was July 2nd (takes for weight loss, and she has lost so much weight that she went to once a month.      Multiple Vitamins-Minerals (MULTIVITAMIN ADULT EXTRA C PO) Take 1 tablet by mouth.      Naloxegol Oxalate (Movantik) 25 MG tablet Take 1 tablet by mouth Every Morning. 30 tablet 3    omeprazole (priLOSEC) 40 MG capsule Take 1 capsule by mouth Daily. 30 capsule 5    ondansetron ODT (ZOFRAN-ODT) 4 MG disintegrating tablet Place 1 tablet on the tongue Every 8 (Eight) Hours As Needed for Nausea or Vomiting. 9 tablet 0    Plecanatide (Trulance) 3 MG tablet Take 1 tablet by mouth Daily. Needs office visit for further refills. (Patient taking differently: Take 1 tablet by mouth Daily.) 30 tablet 3    Sennosides (Chocolated Laxative) 15 MG chewable tablet Chew. 4 squares per week      Thyroid 30 MG PO tablet Take 1 tablet by mouth Daily.      bisacodyl (DULCOLAX) 5 MG EC tablet Take 1 tablet by mouth Daily As Needed for Constipation.      leflunomide (ARAVA) 20 MG tablet Take 1 tablet by mouth Daily. (Patient not taking: Reported on 7/28/2023)      Mounjaro 7.5 MG/0.5ML solution pen-injector Inject as directed subcutaneously once a week.      triamcinolone (KENALOG) 0.1 % cream APPLY TO THE AFFECTED AREA(S) (TO RASH) TWICE DAILY (Patient not taking: Reported on 7/28/2023)      [DISCONTINUED] DULoxetine (CYMBALTA) 60 MG capsule Take 60 mg by mouth  Daily.       Allergies   Allergen Reactions    Iodine Hives    Adhesive Tape Unknown - Low Severity     Blisters skin  Other reaction(s): Unknown - Low Severity      Latex Hives     Past Surgical History:   Procedure Laterality Date    COLONOSCOPY N/A 3/1/2021    Procedure: COLONOSCOPY WITH BIOPSY AND POLYPECTOMY;  Surgeon: Rui Palacios MD;  Location: [facility] ENDOSCOPY;  Service: Gastroenterology;  Laterality: N/A;    CYST REMOVAL  1991    back     DILATATION AND CURETTAGE      MOUTH SURGERY      teeth removal    TONSILLECTOMY AND ADENOIDECTOMY  2004    TUBAL ABDOMINAL LIGATION  2004     Family History   Problem Relation Age of Onset    Colon cancer Paternal Grandfather     Cancer Paternal Grandfather     Aneurysm Paternal Grandfather         brain    Dementia Mother     Stroke Mother     Diabetes Mother     COPD Mother     Migraines Mother     Alcohol abuse Mother     Drug abuse Mother     Rheum arthritis Father     Lymphoma Father     COPD Father     Drug abuse Brother     Diabetes Paternal Grandmother     Pancreatitis Brother     Diabetes Brother     Drug abuse Brother     Migraines Daughter     Constipation Daughter     Cirrhosis Neg Hx     Liver cancer Neg Hx      Social History     Socioeconomic History    Marital status:    Tobacco Use    Smoking status: Former     Packs/day: 1.50     Years: 30.00     Pack years: 45.00     Types: Electronic Cigarette, Cigarettes     Start date: 1992     Quit date: 2020     Years since quitting: 3.5    Smokeless tobacco: Never    Tobacco comments:     Vape   Vaping Use    Vaping Use: Every day    Start date: 11/1/2020    Substances: Nicotine, Flavoring    Devices: Disposable, Refillable tank   Substance and Sexual Activity    Alcohol use: Yes     Comment: social    Drug use: Yes     Types: Heroin     Comment: 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    Sexual activity: Defer     Review of Systems  The following systems were reviewed and negative:  constitution,  eyes, ENT, respiratory, cardiovascular, gastrointestinal, genitourinary, integument, breast, hematologic / lymphatic, musculoskeletal, neurological, behavioral/psych, endocrine, and allergies / immunologic    Objective  Recent Vitals         7/17/2023 7/25/2023 7/28/2023       BP: 120/74 110/76 --     Weight: 64.2 kg (141 lb 9.6 oz) 64 kg (141 lb) 64 kg (141 lb)     BMI (Calculated): 30.6 30.5 29.7           Physical Exam:  General Appearance:  Alert and cooperative, not in any acute distress.   Head:  Atraumatic and normocephalic, without obvious abnormality.   Eyes:          PERRLA, conjunctivae and sclerae normal, no Icterus. No pallor. Extraocular movements are within normal limits.   Ears:  Ears appear intact with no abnormalities noted.   Throat: No oral lesions, no thrush, oral mucosa moist.   Neck: Supple, trachea midline, no thyromegaly, no carotid bruit.   Back:   No kyphoscoliosis present. No tenderness to palpation,   range of motion normal.  No CVAT.   Lungs:   Chest shape is normal. Breath sounds heard bilaterally equally.  No crackles or wheezing. No Pleural rub or bronchial breathing. Normal respiratory effort.   Heart:  Normal S1 and S2, no murmur, no gallop, no rub. No JVD.   Abdomen:   Normal bowel sounds, no masses, no hepatomegaly, no splenomegaly. Soft, non-tender, no guarding, no rebound tenderness.   Extremities: Moves all extremities well, no edema, no cyanosis, no clubbing.  Normal range of motion. Normal strength and tone.   Skin: No bleeding, bruising or rash. No palpable masses noted or induration.   Psychiatric: Alert and oriented  to time, place, and person. Appropriate mood and affect. Thought content organized and appropriate judgment.       Recent Labs  Lab Results (last 7 days)       Procedure Component Value Units Date/Time    Urinalysis, Microscopic Only - Urine, Clean Catch [[id number]]  (Abnormal) Collected: 07/28/23 1434    Specimen: Urine, Clean Catch Updated: 07/28/23 1541      RBC, UA 31-50 /HPF      WBC, UA None Seen /HPF      Comment: Urine culture not indicated.        Bacteria, UA None Seen /HPF      Squamous Epithelial Cells, UA 3-6 /HPF      Hyaline Casts, UA None Seen /LPF      Mucus, UA Moderate/2+ /HPF      Methodology Manual Light Microscopy    Pregnancy, Urine - Urine, Clean Catch [[id number]]  (Normal) Collected: 07/28/23 1434    Specimen: Urine, Clean Catch Updated: 07/28/23 1534     HCG, Urine QL Negative    Urinalysis With Culture If Indicated - Urine, Clean Catch [[id number]]  (Abnormal) Collected: 07/28/23 1434    Specimen: Urine, Clean Catch Updated: 07/28/23 1533     Color, UA Yellow     Appearance, UA Clear     pH, UA 5.5     Specific Gravity, UA 1.029     Glucose, UA Negative     Ketones, UA Trace     Bilirubin, UA Negative     Blood, UA Large (3+)     Protein, UA Trace     Leuk Esterase, UA Negative     Nitrite, UA Negative     Urobilinogen, UA 0.2 E.U./dL    Narrative:      In absence of clinical symptoms, the presence of pyuria, bacteria, and/or nitrites on the urinalysis result does not correlate with infection.    CBC & Differential [[id number]] Collected: 07/28/23 1324    Specimen: Blood Updated: 07/28/23 1408    Narrative:      The following orders were created for panel order CBC & Differential.  Procedure                               Abnormality         Status                     ---------                               -----------         ------                     CBC Auto Differential[[id number]]        Normal              Final result               Scan Slide[[id number]]                                       Final result                 Please view results for these tests on the individual orders.    Scan Slide [[id number]] Collected: 07/28/23 1324    Specimen: Blood Updated: 07/28/23 1408     Scan Slide --     Comment: See Manual Differential Results       Manual Differential [[id number]]  (Abnormal) Collected: 07/28/23 1324    Specimen: Blood Updated:  23 1408     Neutrophil % 14.0 %      Lymphocyte % 40.0 %      Monocyte % 17.0 %      Eosinophil % 6.0 %      Atypical Lymphocyte % 23.0 %      Neutrophils Absolute 0.56 10*3/mm3      Lymphocytes Absolute 2.52 10*3/mm3      Monocytes Absolute 0.68 10*3/mm3      Eosinophils Absolute 0.24 10*3/mm3      Hypochromia Slight/1+     WBC Morphology Normal     Platelet Estimate Adequate    CBC Auto Differential [[id number]]  (Normal) Collected: 23 132    Specimen: Blood Updated: 23 1408     WBC 4.00 10*3/mm3      RBC 4.35 10*6/mm3      Hemoglobin 14.0 g/dL      Hematocrit 40.8 %      MCV 93.8 fL      MCH 32.2 pg      MCHC 34.3 g/dL      RDW 12.4 %      RDW-SD 42.9 fl      MPV 10.1 fL      Platelets 145 10*3/mm3     Basic Metabolic Panel [[id number]]  (Normal) Collected: 23 1324    Specimen: Blood Updated: 23 1359     Glucose 78 mg/dL      BUN 15 mg/dL      Creatinine 0.63 mg/dL      Sodium 139 mmol/L      Potassium 4.4 mmol/L      Chloride 102 mmol/L      CO2 25.0 mmol/L      Calcium 9.1 mg/dL      BUN/Creatinine Ratio 23.8     Anion Gap 12.0 mmol/L      eGFR 115.2 mL/min/1.73     Narrative:      GFR Normal >60  Chronic Kidney Disease <60  Kidney Failure <15               Recent Imaging  US Non-ob Transvaginal    Result Date: 2023  Alicia Atkinson : 1985 MRN: [id number] Date: 2023 Reason for exam/History: Abnormal uterine bleeding The ultrasound images are reviewed.  The uterus is anteverted in position.  The uterus appears abnormal.  There is a 22 mm intramural fibroid seen.  The endometrium measures 8.09 mms.  The bilateral ovaries are normal in appearance.  There is normal vascular flow noted.  There is no free fluid noted. The exam limitations noted:  none See ultrasound report for measurements and structures identified. Rhianna Brambila MD, RDMS [facility] [place]          Assessment   Menorrhagia with irregular cycles  Dysmenorrhea  Intramural fibroid      Plan   D&C with diagnostic hysteroscopy, NovaSure ablation.  ABx and DVT prophylaxis as indicated.  Risks, complications, benefits, and other alternatives discussed.  All questions answered and pt in agreement with plan.    Rhianna Brambila M.D.  7/31/2023  06:18 EDT

## 2023-07-31 NOTE — OP NOTE
PAULETTE Atkinson  : 1985  MRN: [id number]  CSN: [id number]  Date: 2023    Operative Report    Pre-op Diagnosis:  Abnormal uterine bleeding (AUB) [N93.9]  Menorrhagia with irregular cycles  Dysmenorrhea  Intramural fibroid       Post-op Diagnosis:  Post-Op Diagnosis Codes:     * Abnormal uterine bleeding (AUB) [N93.9]        Same     Procedure: Procedure(s):  DILATATION AND CURETTAGE HYSTEROSCOPY NOVASURE ENDOMETRIAL ABLATION     Surgeon: Rhianna Brambila M.D.     Assist: Staff was responsible for performing the following activities: Retraction and Suction and their skilled assistance was necessary for the success of this case.     OR Staff: Circulator: Luh Decker RN; Chandni Weiss RN  Scrub Person: Alicia Rooney     Anesthesia: IV sedation with 1% lidocaine paracervical block     Estimated Blood Loss: 5 mls     Specimens:  Endometrial     Findings: Grossly normal-appearing endometrium with bilateral ostia identified.     Complications: none       Description of Procedure:  After the appropriate time out and adequate dosing of her anesthesia, the patient had been prepped and draped in the usual sterile fashion.  She was placed in the dorsal lithotomy position using Quentin stirrups.  The bladder had been drained with a red rubber catheter per nursing.  A weighted speculum was placed in the vagina.  The anterior lip of the cervix was grasped with a single-tooth tenaculum.  The cervix was injected at the 3 o'clock and 9 o'clock position with 1% lidocaine plain without any complications.  The cervix was then progressively dilated using Alvarado dilators.  Rigid hysteroscopy was then performed with the above findings noted.  Sharp curettings were then obtained with a good cry throughout with tissue retrieved and sent for pathologic specimen.  The endometrial cavity length was assessed at 4 cms and the width had been assessed at 2.6 cms.  After adequate CO2 testing, the NovaSure ablation was performed  for a total time of 1 minutes and 31 seconds with a  power setting of 57 ash. The device was removed.  Repeat hysteroscopy revealed good ablation extending to the cornu as well as the lower uterine segment.  The cervical tenaculum was removed and the cervix was noted to be hemostatic after the application of 2-0 chromic suture in a figure-of-eight fashion.  All instrument and sponge counts were correct at the end of the procedure.  The patient tolerated the procedure well.  There were no complications.  She was taken to the postoperative recovery room in stable condition.    Rhianna Brambila M.D.  7/31/2023  13:25 EDT

## 2023-07-31 NOTE — DISCHARGE INSTRUCTIONS
No pushing, pulling, tugging,  heavy lifting, or strenuous activity.  No major decision making, driving, or drinking alcoholic beverages for 24 hours. ( due to the medications you have  received)  Always use good hand hygiene/washing techniques.  NO driving while taking pain medications.    * if you have an incision:  Check your incision area every day for signs of infection.   Check for:  * more redness, swelling, or pain  *more fluid or blood  *warmth  *pus or bad smellTo assist you in voiding:  Drink plenty of fluids  Listen to running water while attempting to void.    If you are unable to urinate and you have an uncomfortable urge to void or it has been   6 hours since you were discharged, return to the Emergency Room

## 2023-08-01 ENCOUNTER — TELEPHONE (OUTPATIENT)
Dept: OBSTETRICS AND GYNECOLOGY | Facility: CLINIC | Age: 38
End: 2023-08-01
Payer: COMMERCIAL

## 2023-08-02 RX ORDER — SULFAMETHOXAZOLE AND TRIMETHOPRIM 800; 160 MG/1; MG/1
1 TABLET ORAL 2 TIMES DAILY
Qty: 14 TABLET | Refills: 0 | Status: SHIPPED | OUTPATIENT
Start: 2023-08-02 | End: 2023-08-09

## 2023-08-03 LAB — REF LAB TEST METHOD: NORMAL

## 2023-08-23 ENCOUNTER — HOSPITAL ENCOUNTER (EMERGENCY)
Facility: HOSPITAL | Age: 38
Discharge: HOME | End: 2023-08-23
Attending: STUDENT IN AN ORGANIZED HEALTH CARE EDUCATION/TRAINING PROGRAM
Payer: COMMERCIAL

## 2023-08-23 VITALS
DIASTOLIC BLOOD PRESSURE: 92 MMHG | SYSTOLIC BLOOD PRESSURE: 116 MMHG | TEMPERATURE: 97.4 F | WEIGHT: 135.4 LBS | RESPIRATION RATE: 18 BRPM | BODY MASS INDEX: 27.3 KG/M2 | OXYGEN SATURATION: 97 % | HEIGHT: 59 IN | HEART RATE: 76 BPM

## 2023-08-23 DIAGNOSIS — M25.50 ARTHRALGIA, UNSPECIFIED JOINT: Primary | ICD-10-CM

## 2023-08-23 DIAGNOSIS — Z87.39 HISTORY OF RHEUMATOID ARTHRITIS: ICD-10-CM

## 2023-08-23 PROCEDURE — 96372 THER/PROPH/DIAG INJ SC/IM: CPT

## 2023-08-23 PROCEDURE — 99282 EMERGENCY DEPT VISIT SF MDM: CPT

## 2023-08-23 PROCEDURE — 25010000002 DEXAMETHASONE SODIUM PHOSPHATE 10 MG/ML SOLUTION: Performed by: PHYSICIAN ASSISTANT

## 2023-08-23 PROCEDURE — 25010000002 KETOROLAC TROMETHAMINE PER 15 MG: Performed by: PHYSICIAN ASSISTANT

## 2023-08-23 RX ORDER — KETOROLAC TROMETHAMINE 30 MG/ML
60 INJECTION, SOLUTION INTRAMUSCULAR; INTRAVENOUS ONCE
Status: COMPLETED | OUTPATIENT
Start: 2023-08-23 | End: 2023-08-23

## 2023-08-23 RX ORDER — DEXAMETHASONE SODIUM PHOSPHATE 10 MG/ML
10 INJECTION, SOLUTION INTRAMUSCULAR; INTRAVENOUS ONCE
Status: COMPLETED | OUTPATIENT
Start: 2023-08-23 | End: 2023-08-23

## 2023-08-23 RX ADMIN — KETOROLAC TROMETHAMINE 60 MG: 30 INJECTION, SOLUTION INTRAMUSCULAR at 11:14

## 2023-08-23 RX ADMIN — DEXAMETHASONE SODIUM PHOSPHATE 10 MG: 10 INJECTION INTRAMUSCULAR; INTRAVENOUS at 11:14

## 2023-08-23 NOTE — ED PROVIDER NOTES
Subjective  History of Present Illness:    Chief Complaint: Joint Pain  History of Present Illness: Patient is a 38-year-old female with history of ADD, anxiety, borderline personality disorder, ectopic pregnancy, fibroids, fibromyalgia, lupus, migraines, pancreatitis, rheumatoid arthritis, and seizures resenting to the ER for evaluation of joint pain.  She reports that she is having a flareup of her rheumatoid arthritis.  She states she is on Enbrel for this and follows with primary care provider.  She states she had been on a different medication for this recently but it was causing her prolapse to be worse.  She states when she usually has this type of flareup a steroid shot is what helps her the most.  She states she aches all over but most of her pain is in her bilateral hips and her hands.  She denies any fever, chills, chest pain, cough, shortness of breath, emesis, dysuria, hematuria,, or any other symptoms.  She denies any injury or trauma.  Onset: Today  Duration: Persistent   Exacerbating / Alleviating factors: None  Associated symptoms: None      Nurses Notes reviewed and agree, including vitals, allergies, social history and prior medical history.     REVIEW OF SYSTEMS: All systems reviewed and not pertinent unless noted.  Review of Systems      Positive for:  Joint Pain     Negative for: Fever, chills, joint warmth, chest pain, cough, shortness of breath, dizziness, syncope    Past Medical History:   Diagnosis Date    ADD (attention deficit disorder)     Anxiety     Body piercing     both ears    Borderline personality disorder     Chlamydia 2003    Depression     Ectopic pregnancy     Fibroid     Fibromyalgia syndrome     Full dentures     Hepatitis C     hx of.  Negative after treatment.    Hypothyroidism     Lupus (systemic lupus erythematosus)     Migraine headache     Osteoarthritis     Pancreatitis     PMS (premenstrual syndrome)     Preeclampsia     RA (rheumatoid arthritis)     Recurrent  pregnancy loss, antepartum condition or complication     Seizures     withdrawing from opiates/benzos    Tachycardia     Tattoos     Trauma     Varicella     White matter abnormality on MRI of brain        Allergies:    Iodine, Adhesive tape, and Latex      Past Surgical History:   Procedure Laterality Date    COLONOSCOPY N/A 3/1/2021    Procedure: COLONOSCOPY WITH BIOPSY AND POLYPECTOMY;  Surgeon: Rui Palacios MD;  Location: [facility] ENDOSCOPY;  Service: Gastroenterology;  Laterality: N/A;    CYST REMOVAL  1991    back     D & C HYSTEROSCOPY ENDOMETRIAL ABLATION N/A 7/31/2023    Procedure: DILATATION AND CURETTAGE HYSTEROSCOPY NOVASURE ENDOMETRIAL ABLATION;  Surgeon: Rhianna Brambila MD;  Location: [facility] OR;  Service: Obstetrics/Gynecology;  Laterality: N/A;    DILATATION AND CURETTAGE      MOUTH SURGERY      teeth removal    TONSILLECTOMY AND ADENOIDECTOMY  2004    TUBAL ABDOMINAL LIGATION  2004         Social History     Socioeconomic History    Marital status:    Tobacco Use    Smoking status: Former     Packs/day: 1.50     Years: 30.00     Pack years: 45.00     Types: Electronic Cigarette, Cigarettes     Start date: 1992     Quit date: 2020     Years since quitting: 3.6    Smokeless tobacco: Never    Tobacco comments:     Vape   Vaping Use    Vaping Use: Every day    Start date: 11/1/2020    Substances: Nicotine, Flavoring    Devices: Disposable, Refillable tank   Substance and Sexual Activity    Alcohol use: Yes     Comment: social    Drug use: Yes     Types: Heroin     Comment: 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    Sexual activity: Defer         Family History   Problem Relation Age of Onset    Colon cancer Paternal Grandfather     Cancer Paternal Grandfather     Aneurysm Paternal Grandfather         brain    Dementia Mother     Stroke Mother     Diabetes Mother     COPD Mother     Migraines Mother     Alcohol abuse Mother     Drug abuse Mother     Rheum arthritis Father     Lymphoma Father      "COPD Father     Drug abuse Brother     Diabetes Paternal Grandmother     Pancreatitis Brother     Diabetes Brother     Drug abuse Brother     Migraines Daughter     Constipation Daughter     Cirrhosis Neg Hx     Liver cancer Neg Hx        Objective  Physical Exam:  /92 (BP Location: Left arm, Patient Position: Lying)   Pulse 76   Temp 97.4 øF (36.3 øC)   Resp 18   Ht 149.9 cm (59\")   Wt 61.4 kg (135 lb 6.4 oz)   SpO2 97%   BMI 27.35 kg/mý      Physical Exam    CONSTITUTIONAL: Well developed, no acute distress, nontoxic in appearance.  She is awake, alert, speaking in full sentences  VITAL SIGNS: per nursing, reviewed and noted  SKIN: exposed skin with no rashes, ulcerations or petechiae  EYES: Grossly EOMI, no icterus  ENT: Normal voice.  Nares patent, no facial asymmetry  RESPIRATORY:  No increased work of breathing. No retractions.  Lung sounds are clear bilaterally  CARDIOVASCULAR:  regular rate and rhythm  MUSCULOSKELETAL: Patient has some tenderness to the bilateral lumbar and buttock area, no obvious rashes or lesions noted.  Full range of motion of all extremities, radial pulses 2+  NEUROLOGIC: Alert, oriented x 3. No gross deficits. GCS 15.   PSYCH: appropriate affect.      Procedures    ED Course:             Lab Results (last 24 hours)       ** No results found for the last 24 hours. **             No radiology results from the last 24 hrs       MDM      Initial impression of presenting illness: Patient is a 38-year-old female presenting to the ER for evaluation of joint pain    DDX: includes but is not limited to: Osteoarthritis, rheumatoid arthritis, and other concerns.  She denies any injury or trauma concerning for underlying fracture.  She denies any fever or other symptoms concerning for underlying infectious etiology    Patient arrives in overall stable condition with vitals interpreted by myself.     Pertinent features from physical exam: Afebrile, no acute distress, mild tenderness " over the bilateral buttock region, no rashes or lesions noted    Initial diagnostic plan: Do not believe labs are needed at this time, patient has no infectious signs.  Do not believe imaging is needed given there is been no trauma.  She states usually does well with the steroid injection.  Will give Decadron and Toradol for pain.    Diagnostic information from other sources: Chart review     Interventions / Re-evaluation: Patient was given Toradol and steroid shot here.    Results/clinical rationale were discussed with patient.  Discussed follow-up with her primary care provider strict return precautions to the ER.  She verbalized understanding and was in agreement with this plan of care    Consultations/Discussion of results with other physicians: None    Disposition plan: Discharge   -----    Final diagnoses:   Arthralgia, unspecified joint   History of rheumatoid arthritis          Merced Garcia PA-C  08/23/23 152

## 2023-08-23 NOTE — DISCHARGE INSTRUCTIONS
Take your home medications as directed.  Try to follow-up with your primary doctor as early as possible.  Return to the ER for any change, worsening symptoms, or any additional concerns.

## 2023-10-09 ENCOUNTER — OFFICE VISIT (OUTPATIENT)
Dept: GASTROENTEROLOGY | Facility: CLINIC | Age: 38
End: 2023-10-09
Payer: COMMERCIAL

## 2023-10-09 VITALS
HEIGHT: 59 IN | WEIGHT: 136 LBS | OXYGEN SATURATION: 96 % | HEART RATE: 102 BPM | DIASTOLIC BLOOD PRESSURE: 84 MMHG | BODY MASS INDEX: 27.42 KG/M2 | SYSTOLIC BLOOD PRESSURE: 126 MMHG | RESPIRATION RATE: 18 BRPM

## 2023-10-09 DIAGNOSIS — K59.03 THERAPEUTIC OPIOID INDUCED CONSTIPATION: Chronic | ICD-10-CM

## 2023-10-09 DIAGNOSIS — K59.00 CONSTIPATION, UNSPECIFIED CONSTIPATION TYPE: ICD-10-CM

## 2023-10-09 DIAGNOSIS — K21.9 GASTROESOPHAGEAL REFLUX DISEASE WITHOUT ESOPHAGITIS: Chronic | ICD-10-CM

## 2023-10-09 DIAGNOSIS — K59.04 CHRONIC IDIOPATHIC CONSTIPATION: Primary | Chronic | ICD-10-CM

## 2023-10-09 DIAGNOSIS — T40.2X5A THERAPEUTIC OPIOID INDUCED CONSTIPATION: Chronic | ICD-10-CM

## 2023-10-09 PROCEDURE — 99214 OFFICE O/P EST MOD 30 MIN: CPT | Performed by: NURSE PRACTITIONER

## 2023-10-09 NOTE — PROGRESS NOTES
Follow Up Note     Date: 10/09/2023   Patient Name: Alicia Atkinson  MRN: [id number]  : 1985     Primary Care Provider: Nitesh Vazquez MD     Chief Complaint   Patient presents with    Constipation     History of present illness:   10/9/2023  Alicia Atkinson is a 38 y.o. female who is here today for follow up for constipation. She changed insurance and her copay is now $75 for Trulance, but the Trulance is not working as well as it used to. She is also taking the Movantik, 3 stool softeners in the morning and in the evening, as well as laxatives as needed and may have a bowel movement every few days. She is on Suboxone. She has stopped Mounjaro now as she is at her goal weight. Denies GI bleeding.     Interval History:  2023  Alicia Atkinson is a 37 y.o. female who is here today for follow up for constipation. She has been taking Trulance and it is no longer working for her constipation for the past few months. She is also adding in laxatives and castor oil capsules daily and stool softeners twice a day. She is having a bowel movement once a week. No abdominal pain. Of interest, she started on Mounjaro around the middle of 2023. She is no longer taking it weekly as it is getting harder to get. She is still on Suboxone. No rectal bleeding.      3/31/2021  Alicia Atkinson is a 35 y.o. female who is here today for follow up regarding constipation, Hepatitis C and recent colonoscopy. She has had constipation throughout her life. Currently she is only having BMs after taking Ex lax chocolate bar. She has tried senokot, miralax, stool softeners and Linzess (unsure of dosage) in the past without relief. Has history of needing to manually disimpact. She does take Suboxone daily as prescribed by her physician. She has been working on losing weight with diet modification, low carb diet. Has lost 3 lbs since her last visit approx 1 month ago. She had an episode recently of bloating which was more prominent on  her left abdomen, pain and rectal bleeding. Also had colonoscopy recently and would like to discuss those results.      For treatment of hepatitis C infection, she has been taking mavyret 3 tabs PO once daily. She has not missed any doses of this medication. Has not noticed any side effects with it. She is on week 4/8 of treatment.       2/9/2021  She has chronic constipation since she was a teenager. Her bowel movements are like once a week. Associated bloating and occasional cramps diffuse. She takes laxative bar every week. She was on subaxone since year or so. She did mialax every day , did not help. Tried dulcolax when she is severely constipated that causes significant cramps. She also has nausea, but no vomiting.      She will see red blood in wipes occasionally. No recent change in bowel habit, hematochezia or melena.  Weight is stable. Pt denies  odynophagia or dysphagia. There is history of acid reflux on ppi daily. There is no history of anemia. No prior history of EGD. Last colonoscopy was 2010 and had two polyps removed. Family history of colon cancer ; Grand father and uncle had colon cancer. No history of any abdominal surgery. Denies alcohol abuse or cigarette smoking. She still vapes.   She had recent  blood work done that was positive for hep c antibody. She had prior history of substance abuse and she is clean.   She has RA and was on hunira. Humira on hold now as her hep c antibody came out postive.     Subjective      Past Medical History:   Diagnosis Date    ADD (attention deficit disorder)     Anxiety     Body piercing     both ears    Borderline personality disorder     Chlamydia 2003    Depression     Ectopic pregnancy     Fibroid     Fibromyalgia syndrome     Full dentures     Hepatitis C     hx of.  Negative after treatment.    Hypothyroidism     Lupus (systemic lupus erythematosus)     Migraine headache     Osteoarthritis     Pancreatitis     PMS (premenstrual syndrome)     Preeclampsia      RA (rheumatoid arthritis)     Recurrent pregnancy loss, antepartum condition or complication     Seizures     withdrawing from opiates/benzos    Tachycardia     Tattoos     Trauma     Varicella     White matter abnormality on MRI of brain      Past Surgical History:   Procedure Laterality Date    COLONOSCOPY N/A 3/1/2021    Procedure: COLONOSCOPY WITH BIOPSY AND POLYPECTOMY;  Surgeon: Rui Palacios MD;  Location: [facility] ENDOSCOPY;  Service: Gastroenterology;  Laterality: N/A;    CYST REMOVAL  1991    back     D & C HYSTEROSCOPY ENDOMETRIAL ABLATION N/A 7/31/2023    Procedure: DILATATION AND CURETTAGE HYSTEROSCOPY NOVASURE ENDOMETRIAL ABLATION;  Surgeon: Rhianna Brambila MD;  Location: [facility] OR;  Service: Obstetrics/Gynecology;  Laterality: N/A;    DILATATION AND CURETTAGE      MOUTH SURGERY      teeth removal    TONSILLECTOMY AND ADENOIDECTOMY  2004    TUBAL ABDOMINAL LIGATION  2004     Family History   Problem Relation Age of Onset    Colon cancer Paternal Grandfather     Cancer Paternal Grandfather     Aneurysm Paternal Grandfather         brain    Dementia Mother     Stroke Mother     Diabetes Mother     COPD Mother     Migraines Mother     Alcohol abuse Mother     Drug abuse Mother     Rheum arthritis Father     Lymphoma Father     COPD Father     Drug abuse Brother     Diabetes Paternal Grandmother     Pancreatitis Brother     Diabetes Brother     Drug abuse Brother     Migraines Daughter     Constipation Daughter     Cirrhosis Neg Hx     Liver cancer Neg Hx      Social History     Socioeconomic History    Marital status:    Tobacco Use    Smoking status: Former     Packs/day: 1.50     Years: 30.00     Additional pack years: 0.00     Total pack years: 45.00     Types: Electronic Cigarette, Cigarettes     Start date: 1992     Quit date: 2020     Years since quitting: 3.7    Smokeless tobacco: Never    Tobacco comments:     Vape   Vaping Use    Vaping Use: Every day    Start date: 11/1/2020     Substances: Nicotine, Flavoring    Devices: Disposable, Refillable tank   Substance and Sexual Activity    Alcohol use: Yes     Comment: social    Drug use: Yes     Types: Heroin     Comment: 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    Sexual activity: Defer       Current Outpatient Medications:     acetaminophen (TYLENOL) 500 MG tablet, Take 2 tablets by mouth Every 8 (Eight) Hours As Needed for Mild Pain or Moderate Pain., Disp: 60 tablet, Rfl: 0    baclofen (LIORESAL) 20 MG tablet, Take 1 tablet by mouth At Night As Needed for Muscle Spasms., Disp: , Rfl:     bisacodyl (DULCOLAX) 5 MG EC tablet, Take 1 tablet by mouth Daily As Needed for Constipation., Disp: , Rfl:     buprenorphine-naloxone (SUBOXONE) 8-2 MG per SL tablet, Place 0.25 tablets under the tongue Daily., Disp: , Rfl:     cyanocobalamin 1000 MCG/ML injection, Inject 1 mL under the skin into the appropriate area as directed Every 30 (Thirty) Days., Disp: 1 mL, Rfl: 3    Enbrel Mini 50 MG/ML solution cartridge, 1 (One) Time Per Week., Disp: , Rfl:     fluorometholone (FML) 0.1 % ophthalmic suspension, Instill ONE drop IN EACH EYE AT BEDTIME, Disp: , Rfl:     folic acid (FOLVITE) 1 MG tablet, Take 1 tablet by mouth Daily., Disp: , Rfl:     furosemide (LASIX) 20 MG tablet, TAKE ONE TABLET BY MOUTH EVERY DAY (Patient taking differently: Has been taking PRN (if she gets her fluid intake)), Disp: 90 tablet, Rfl: 1    gabapentin (NEURONTIN) 800 MG tablet, Take 1 tablet by mouth 2 (Two) Times a Day., Disp: , Rfl: 3    ibuprofen (ADVIL,MOTRIN) 800 MG tablet, Take 1 tablet by mouth Every 8 (Eight) Hours As Needed for Mild Pain., Disp: 30 tablet, Rfl: 0    Ibuprofen-Acetaminophen 125-250 MG tablet, Take  by mouth., Disp: , Rfl:     leflunomide (ARAVA) 20 MG tablet, Take 1 tablet by mouth Daily., Disp: , Rfl:     loratadine (CLARITIN) 10 MG tablet, Take 1 tablet by mouth Daily., Disp: , Rfl:     Multiple Vitamins-Minerals (MULTIVITAMIN ADULT EXTRA C PO), Take 1 tablet  by mouth., Disp: , Rfl:     Naloxegol Oxalate (Movantik) 25 MG tablet, Take 1 tablet by mouth Every Morning., Disp: 90 tablet, Rfl: 1    omeprazole (priLOSEC) 40 MG capsule, Take 1 capsule by mouth Daily., Disp: 30 capsule, Rfl: 5    ondansetron (ZOFRAN) 8 MG tablet, Take 1 tablet by mouth Every 8 (Eight) Hours As Needed for Nausea or Vomiting., Disp: 15 tablet, Rfl: 0    ondansetron ODT (ZOFRAN-ODT) 4 MG disintegrating tablet, Place 1 tablet on the tongue Every 8 (Eight) Hours As Needed for Nausea or Vomiting., Disp: 9 tablet, Rfl: 0    Sennosides (Chocolated Laxative) 15 MG chewable tablet, Chew. 4 squares per week, Disp: , Rfl:     Thyroid 30 MG PO tablet, Take 1 tablet by mouth Daily., Disp: , Rfl:     triamcinolone (KENALOG) 0.1 % cream, , Disp: , Rfl:     linaclotide (Linzess) 290 MCG capsule capsule, Take 1 capsule by mouth Every Morning Before Breakfast., Disp: 90 capsule, Rfl: 1    Allergies   Allergen Reactions    Iodine Hives    Adhesive Tape Unknown - Low Severity     Blisters skin  Other reaction(s): Unknown - Low Severity      Latex Hives     The following portions of the patient's history were reviewed and updated as appropriate: allergies, current medications, past family history, past medical history, past social history, past surgical history and problem list.  Objective     Physical Exam  Vitals and nursing note reviewed.   Constitutional:       General: She is not in acute distress.     Appearance: Normal appearance. She is well-developed.   HENT:      Head: Normocephalic and atraumatic.      Mouth/Throat:      Mouth: Mucous membranes are not pale, not dry and not cyanotic.   Eyes:      General: Lids are normal.   Neck:      Trachea: Trachea normal.   Cardiovascular:      Rate and Rhythm: Normal rate.   Pulmonary:      Effort: Pulmonary effort is normal. No respiratory distress.      Breath sounds: Normal breath sounds.   Abdominal:      Tenderness: There is no abdominal tenderness.   Skin:      "General: Skin is warm and dry.   Neurological:      Mental Status: She is alert and oriented to person, place, and time.   Psychiatric:         Mood and Affect: Mood normal.         Speech: Speech normal.         Behavior: Behavior normal. Behavior is cooperative.       Vitals:    10/09/23 1446   BP: 126/84   Pulse: 102   Resp: 18   SpO2: 96%   Weight: 61.7 kg (136 lb)   Height: 149.9 cm (59\")     Body mass index is 27.47 kg/mý.     Results Review:   I reviewed the patient's new clinical results.    Admission on 07/31/2023, Discharged on 07/31/2023   Component Date Value Ref Range Status    WBC 07/28/2023 4.00  3.40 - 10.80 10*3/mm3 Final    RBC 07/28/2023 4.35  3.77 - 5.28 10*6/mm3 Final    Hemoglobin 07/28/2023 14.0  12.0 - 15.9 g/dL Final    Hematocrit 07/28/2023 40.8  34.0 - 46.6 % Final    MCV 07/28/2023 93.8  79.0 - 97.0 fL Final    MCH 07/28/2023 32.2  26.6 - 33.0 pg Final    MCHC 07/28/2023 34.3  31.5 - 35.7 g/dL Final    RDW 07/28/2023 12.4  12.3 - 15.4 % Final    RDW-SD 07/28/2023 42.9  37.0 - 54.0 fl Final    MPV 07/28/2023 10.1  6.0 - 12.0 fL Final    Platelets 07/28/2023 145  140 - 450 10*3/mm3 Final    Glucose 07/28/2023 78  65 - 99 mg/dL Final    BUN 07/28/2023 15  6 - 20 mg/dL Final    Creatinine 07/28/2023 0.63  0.57 - 1.00 mg/dL Final    Sodium 07/28/2023 139  136 - 145 mmol/L Final    Potassium 07/28/2023 4.4  3.5 - 5.2 mmol/L Final    Chloride 07/28/2023 102  98 - 107 mmol/L Final    CO2 07/28/2023 25.0  22.0 - 29.0 mmol/L Final    Calcium 07/28/2023 9.1  8.6 - 10.5 mg/dL Final    BUN/Creatinine Ratio 07/28/2023 23.8  7.0 - 25.0 Final    Anion Gap 07/28/2023 12.0  5.0 - 15.0 mmol/L Final    eGFR 07/28/2023 115.2  >60.0 mL/min/1.73 Final    QT Interval 07/28/2023 388  ms Final    QTC Interval 07/28/2023 439  ms Final    HCG, Urine QL 07/28/2023 Negative  Negative Final   Office Visit on 07/25/2023   Component Date Value Ref Range Status    HCG, Urine, QL 07/25/2023 Negative  Negative Final    Lot " Number 07/25/2023 [id number]   Final    Internal Positive Control 07/25/2023 Positive  Positive, Passed Final    Internal Negative Control 07/25/2023 Negative  Negative, Passed Final    Expiration Date 07/25/2023 05/31/2024   Final    Reference Lab Report 07/25/2023    Final   Office Visit on 07/17/2023   Component Date Value Ref Range Status    Estradiol 07/18/2023 52.4  pg/mL Final    FSH 07/18/2023 5.30  mIU/mL Final    LH 07/18/2023 4.06  mIU/mL Final    Progesterone 07/18/2023 0.29  ng/mL Final      US Abdomen Complete     Result Date: 2/25/2021  Cholecystectomy. Otherwise, unremarkable ultrasound of the abdomen.           Colonoscopy was completed by Dr. Palacios on 3/1/2021   - Preparation of the colon was fair.  - Four 2 to 3 mm polyps at the recto-sigmoid colon, removed with a cold biopsy forceps. Resected and retrieved. Clinically hyperplastic  - Stool in the entire examined colon.  - Biopsies were taken with a cold forceps from the right colon, left colon and transverse colon for evaluation of microscopic colitis.  - No endoscopic signs of colitis or ileitis  - Pathology pathology showed ileal mucosa with no significant histopathologic abnormality of the terminal ileal biopsy, random colon biopsies were normal, rectosigmoid polyps x4 were hyperplastic.    Assessment / Plan      1. Chronic idiopathic constipation  2. Therapeutic opioid induced constipation  3. Constipation, unspecified constipation type  She has been taking Trulance 3 mg daily and Movantik 25 mg daily along with stool softeners and over-the-counter laxatives and has a bowel movement every couple of days.  No rectal bleeding.  She is on Suboxone.  Basic labs unremarkable.  TSH normal. Complete abdominal ultrasound unremarkable. She did not do CT scan ordered at last visit, reasons unknown.  Colonoscopy dated 3/1/2021 unremarkable.  Biopsies unremarkable.  Likely CIC with overlapping opioid-induced constipation.  Linzess 290 mcg daily.  Stop  Trulance.  Movantik 25 mg 1 p.o. daily.  Continue stool softeners daily.  May take over-the-counter laxative as needed.  Low FODMAP diet.    - linaclotide (Linzess) 290 MCG capsule capsule; Take 1 capsule by mouth Every Morning Before Breakfast.  Dispense: 90 capsule; Refill: 1  - Naloxegol Oxalate (Movantik) 25 MG tablet; Take 1 tablet by mouth Every Morning.  Dispense: 90 tablet; Refill: 1    4. Gastroesophageal reflux disease without esophagitis  Reflux reasonably controlled with omeprazole 40 mg daily.  Continue same for now.  No difficulty swallowing.  Antireflux measures.    5. History of hepatitis C  6. Overweight BMI 27.47  She had been taking Mounjaro and has lost about 48 pounds since the end of January 2023.  This is intentional.  She is at her goal weight and is now off Mounjaro and is continuing to watch her diet and has maintained weight loss.  She has a history of hepatitis C and has completed treatment with Mavyret x8 weeks.  HCVRNA quantitative PCR with HCV not detected 12 weeks after completing treatment.  Previous hepatitis C FibroSure with F0/no fibrosis.  HIV test negative.  She is immune to hepatitis A and hepatitis B.  Continue low-fat diet, exercise and weight reduction.     7. Encounter for screening for malignant neoplasm of colon  Colonoscopy dated 3/1/2021 with 4 polyps removed, hyperplastic.    She will need to have colonoscopy for colon cancer screening in 10 years, 2031, or sooner if needed.    Patient Instructions   Antireflux measures: Avoid fried, fatty foods, alcohol, chocolate, coffee, tea,  soft drinks, all carbonated beverages (including sparkling water), peppermint and spearmint, spicy foods, tomatoes and tomato based foods, onions, peppers, and garlic.   Other antireflux measures include weight reduction if overweight, avoiding tight clothing around the abdomen, elevating the head of the bed 6 inches with blocks under the head board, and don't drink or eat before going to bed  and avoid lying down immediately after meals.  Avoid vaping/smoking.  Omeprazole 40 mg 1 by mouth in the am 30 minutes before breakfast.  Zofran 4 mg 1 po every 8 hours as needed for nausea.  Low FODMAP diet - avoid all dairy. May use lactose free/dairy free alternatives.   Linzess 290 mcg 1 po daily 30 minutes prior to breakfast.   Movantik 25 mg 1 po once a day.   Continue stool softeners 2 per day as needed.   May take a laxative daily as needed.   Follow up: 6 months     Low-FODMAP Eating Plan  FODMAP stands for fermentable oligosaccharides, disaccharides, monosaccharides, and polyols. These are sugars that are hard for some people to digest. A low-FODMAP eating plan may help some people who have irritable bowel syndrome (IBS) and certain other bowel (intestinal) diseases to manage their symptoms.  This meal plan can be complicated to follow. Work with a diet and nutrition specialist (dietitian) to make a low-FODMAP eating plan that is right for you. A dietitian can help make sure that you get enough nutrition from this diet.  What are tips for following this plan?  Reading food labels  Check labels for hidden FODMAPs such as:  High-fructose syrup.  Honey.  Agave.  Natural fruit flavors.  Onion or garlic powder.  Choose low-FODMAP foods that contain 3-4 grams of fiber per serving.  Check food labels for serving sizes. Eat only one serving at a time to make sure FODMAP levels stay low.  Shopping  Shop with a list of foods that are recommended on this diet and make a meal plan.  Meal planning  Follow a low-FODMAP eating plan for up to 6 weeks, or as told by your health care provider or dietitian.  To follow the eating plan:  Eliminate high-FODMAP foods from your diet completely. Choose only low-FODMAP foods to eat. You will do this for 2-6 weeks.  Gradually reintroduce high-FODMAP foods into your diet one at a time. Most people should wait a few days before introducing the next new high-FODMAP food into their meal  plan. Your dietitian can recommend how quickly you may reintroduce foods.  Keep a daily record of what and how much you eat and drink. Make note of any symptoms that you have after eating.  Review your daily record with a dietitian regularly to identify which foods you can eat and which foods you should avoid.  General tips  Drink enough fluid each day to keep your urine pale yellow.  Avoid processed foods. These often have added sugar and may be high in FODMAPs.  Avoid most dairy products, whole grains, and sweeteners.  Work with a dietitian to make sure you get enough fiber in your diet.  Avoid high FODMAP foods at meals to manage symptoms.     Recommended foods  Fruits  Bananas, oranges, tangerines, georgina, limes, blueberries, raspberries, strawberries, grapes, cantaloupe, honeydew melon, kiwi, papaya, passion fruit, and pineapple. Limited amounts of dried cranberries, banana chips, and shredded coconut.  Vegetables  Eggplant, zucchini, cucumber, peppers, green beans, bean sprouts, lettuce, arugula, kale, Swiss chard, spinach, miquel greens, bok alejandra, summer squash, potato, and tomato. Limited amounts of corn, carrot, and sweet potato. Green parts of scallions.  Grains  Gluten-free grains, such as rice, oats, buckwheat, quinoa, corn, polenta, and millet. Gluten-free pasta, bread, or cereal. Rice noodles. Corn tortillas.  Meats and other proteins  Unseasoned beef, pork, poultry, or fish. Eggs. Mascorro. Tofu (firm) and tempeh. Limited amounts of nuts and seeds, such as almonds, walnuts, brazil nuts, pecans, peanuts, nut butters, pumpkin seeds, mike seeds, and sunflower seeds.  Dairy  Lactose-free milk, yogurt, and kefir. Lactose-free cottage cheese and ice cream. Non-dairy milks, such as almond, coconut, hemp, and rice milk. Non-dairy yogurt. Limited amounts of goat cheese, brie, mozzarella, parmesan, swiss, and other hard cheeses.  Fats and oils  Butter-free spreads. Vegetable oils, such as olive, canola, and  "sunflower oil.  Seasoning and other foods  Artificial sweeteners with names that do not end in \"ol,\" such as aspartame, saccharine, and stevia. Maple syrup, white table sugar, raw sugar, brown sugar, and molasses. Mayonnaise, soy sauce, and tamari. Fresh basil, coriander, parsley, rosemary, and thyme.  Beverages  Water and mineral water. Sugar-sweetened soft drinks. Small amounts of orange juice or cranberry juice. Black and green tea. Most dry flaco. Coffee.  The items listed above may not be a complete list of foods and beverages you can eat. Contact a dietitian for more information.     Foods to avoid  Fruits  Fresh, dried, and juiced forms of apple, pear, watermelon, peach, plum, cherries, apricots, blackberries, boysenberries, figs, nectarines, and constanza. Avocado.  Vegetables  Chicory root, artichoke, asparagus, cabbage, snow peas, [place] sprouts, broccoli, sugar snap peas, mushrooms, celery, and cauliflower. Onions, garlic, leeks, and the white part of scallions.  Grains  Wheat, including kamut, durum, and semolina. Barley and bulgur. Couscous. Wheat-based cereals. Wheat noodles, bread, crackers, and pastries.  Meats and other proteins  Fried or fatty meat. Sausage. Cashews and pistachios. Soybeans, baked beans, black beans, chickpeas, kidney beans, reid beans, navy beans, lentils, black-eyed peas, and split peas.  Dairy  Milk, yogurt, ice cream, and soft cheese. Cream and sour cream. Milk-based sauces. Custard. Buttermilk. Soy milk.  Seasoning and other foods  Any sugar-free gum or candy. Foods that contain artificial sweeteners such as sorbitol, mannitol, isomalt, or xylitol. Foods that contain honey, high-fructose corn syrup, or agave. Bouillon, vegetable stock, beef stock, and chicken stock. Garlic and onion powder. Condiments made with onion, such as hummus, chutney, pickles, relish, salad dressing, and salsa. Tomato paste.  Beverages  Chicory-based drinks. Coffee substitutes. Chamomile tea. Fennel tea. " Sweet or fortified flaco such as port or josefa. Diet soft drinks made with isomalt, mannitol, maltitol, sorbitol, or xylitol. Apple, pear, and constanza juice. Juices with high-fructose corn syrup.  The items listed above may not be a complete list of foods and beverages you should avoid. Contact a dietitian for more information.  Summary  FODMAP stands for fermentable oligosaccharides, disaccharides, monosaccharides, and polyols. These are sugars that are hard for some people to digest.  A low-FODMAP eating plan is a short-term diet that helps to ease symptoms of certain bowel diseases.  The eating plan usually lasts up to 6 weeks. After that, high-FODMAP foods are reintroduced gradually and one at a time. This can help you find out which foods may be causing symptoms.  A low-FODMAP eating plan can be complicated. It is best to work with a dietitian who has experience with this type of plan.  This information is not intended to replace advice given to you by your health care provider. Make sure you discuss any questions you have with your health care provider.  Document Revised: 05/06/2021 Document Reviewed: 05/06/2021  Elsevier Patient Education c 2022 [employer]    Perez Germain, ATA  10/9/2023    Please note that portions of this note were completed with a voice recognition program.

## 2023-10-09 NOTE — PATIENT INSTRUCTIONS
Antireflux measures: Avoid fried, fatty foods, alcohol, chocolate, coffee, tea,  soft drinks, all carbonated beverages (including sparkling water), peppermint and spearmint, spicy foods, tomatoes and tomato based foods, onions, peppers, and garlic.   Other antireflux measures include weight reduction if overweight, avoiding tight clothing around the abdomen, elevating the head of the bed 6 inches with blocks under the head board, and don't drink or eat before going to bed and avoid lying down immediately after meals.  Avoid vaping/smoking.  Omeprazole 40 mg 1 by mouth in the am 30 minutes before breakfast.  Zofran 4 mg 1 po every 8 hours as needed for nausea.  Low FODMAP diet - avoid all dairy. May use lactose free/dairy free alternatives.   Linzess 290 mcg 1 po daily 30 minutes prior to breakfast.   Movantik 25 mg 1 po once a day.   Continue stool softeners 2 per day as needed.   May take a laxative daily as needed.   Follow up: 6 months     Low-FODMAP Eating Plan  FODMAP stands for fermentable oligosaccharides, disaccharides, monosaccharides, and polyols. These are sugars that are hard for some people to digest. A low-FODMAP eating plan may help some people who have irritable bowel syndrome (IBS) and certain other bowel (intestinal) diseases to manage their symptoms.  This meal plan can be complicated to follow. Work with a diet and nutrition specialist (dietitian) to make a low-FODMAP eating plan that is right for you. A dietitian can help make sure that you get enough nutrition from this diet.  What are tips for following this plan?  Reading food labels  Check labels for hidden FODMAPs such as:  High-fructose syrup.  Honey.  Agave.  Natural fruit flavors.  Onion or garlic powder.  Choose low-FODMAP foods that contain 3-4 grams of fiber per serving.  Check food labels for serving sizes. Eat only one serving at a time to make sure FODMAP levels stay low.  Shopping  Shop with a list of foods that are recommended on  this diet and make a meal plan.  Meal planning  Follow a low-FODMAP eating plan for up to 6 weeks, or as told by your health care provider or dietitian.  To follow the eating plan:  Eliminate high-FODMAP foods from your diet completely. Choose only low-FODMAP foods to eat. You will do this for 2-6 weeks.  Gradually reintroduce high-FODMAP foods into your diet one at a time. Most people should wait a few days before introducing the next new high-FODMAP food into their meal plan. Your dietitian can recommend how quickly you may reintroduce foods.  Keep a daily record of what and how much you eat and drink. Make note of any symptoms that you have after eating.  Review your daily record with a dietitian regularly to identify which foods you can eat and which foods you should avoid.  General tips  Drink enough fluid each day to keep your urine pale yellow.  Avoid processed foods. These often have added sugar and may be high in FODMAPs.  Avoid most dairy products, whole grains, and sweeteners.  Work with a dietitian to make sure you get enough fiber in your diet.  Avoid high FODMAP foods at meals to manage symptoms.     Recommended foods  Fruits  Bananas, oranges, tangerines, georgina, limes, blueberries, raspberries, strawberries, grapes, cantaloupe, honeydew melon, kiwi, papaya, passion fruit, and pineapple. Limited amounts of dried cranberries, banana chips, and shredded coconut.  Vegetables  Eggplant, zucchini, cucumber, peppers, green beans, bean sprouts, lettuce, arugula, kale, Swiss chard, spinach, miquel greens, bok alejandra, summer squash, potato, and tomato. Limited amounts of corn, carrot, and sweet potato. Green parts of scallions.  Grains  Gluten-free grains, such as rice, oats, buckwheat, quinoa, corn, polenta, and millet. Gluten-free pasta, bread, or cereal. Rice noodles. Corn tortillas.  Meats and other proteins  Unseasoned beef, pork, poultry, or fish. Eggs. Mascorro. Tofu (firm) and tempeh. Limited amounts of  "nuts and seeds, such as almonds, walnuts, brazil nuts, pecans, peanuts, nut butters, pumpkin seeds, mike seeds, and sunflower seeds.  Dairy  Lactose-free milk, yogurt, and kefir. Lactose-free cottage cheese and ice cream. Non-dairy milks, such as almond, coconut, hemp, and rice milk. Non-dairy yogurt. Limited amounts of goat cheese, brie, mozzarella, parmesan, swiss, and other hard cheeses.  Fats and oils  Butter-free spreads. Vegetable oils, such as olive, canola, and sunflower oil.  Seasoning and other foods  Artificial sweeteners with names that do not end in \"ol,\" such as aspartame, saccharine, and stevia. Maple syrup, white table sugar, raw sugar, brown sugar, and molasses. Mayonnaise, soy sauce, and tamari. Fresh basil, coriander, parsley, rosemary, and thyme.  Beverages  Water and mineral water. Sugar-sweetened soft drinks. Small amounts of orange juice or cranberry juice. Black and green tea. Most dry flaco. Coffee.  The items listed above may not be a complete list of foods and beverages you can eat. Contact a dietitian for more information.     Foods to avoid  Fruits  Fresh, dried, and juiced forms of apple, pear, watermelon, peach, plum, cherries, apricots, blackberries, boysenberries, figs, nectarines, and constanza. Avocado.  Vegetables  Chicory root, artichoke, asparagus, cabbage, snow peas, [place] sprouts, broccoli, sugar snap peas, mushrooms, celery, and cauliflower. Onions, garlic, leeks, and the white part of scallions.  Grains  Wheat, including kamut, durum, and semolina. Barley and bulgur. Couscous. Wheat-based cereals. Wheat noodles, bread, crackers, and pastries.  Meats and other proteins  Fried or fatty meat. Sausage. Cashews and pistachios. Soybeans, baked beans, black beans, chickpeas, kidney beans, reid beans, navy beans, lentils, black-eyed peas, and split peas.  Dairy  Milk, yogurt, ice cream, and soft cheese. Cream and sour cream. Milk-based sauces. Custard. Buttermilk. Soy " milk.  Seasoning and other foods  Any sugar-free gum or candy. Foods that contain artificial sweeteners such as sorbitol, mannitol, isomalt, or xylitol. Foods that contain honey, high-fructose corn syrup, or agave. Bouillon, vegetable stock, beef stock, and chicken stock. Garlic and onion powder. Condiments made with onion, such as hummus, chutney, pickles, relish, salad dressing, and salsa. Tomato paste.  Beverages  Chicory-based drinks. Coffee substitutes. Chamomile tea. Fennel tea. Sweet or fortified flaco such as port or josefa. Diet soft drinks made with isomalt, mannitol, maltitol, sorbitol, or xylitol. Apple, pear, and constanza juice. Juices with high-fructose corn syrup.  The items listed above may not be a complete list of foods and beverages you should avoid. Contact a dietitian for more information.  Summary  FODMAP stands for fermentable oligosaccharides, disaccharides, monosaccharides, and polyols. These are sugars that are hard for some people to digest.  A low-FODMAP eating plan is a short-term diet that helps to ease symptoms of certain bowel diseases.  The eating plan usually lasts up to 6 weeks. After that, high-FODMAP foods are reintroduced gradually and one at a time. This can help you find out which foods may be causing symptoms.  A low-FODMAP eating plan can be complicated. It is best to work with a dietitian who has experience with this type of plan.  This information is not intended to replace advice given to you by your health care provider. Make sure you discuss any questions you have with your health care provider.  Document Revised: 05/06/2021 Document Reviewed: 05/06/2021  [employer]vier Patient Education c 2022 [employer]

## 2023-12-20 ENCOUNTER — LAB REQUISITION (OUTPATIENT)
Dept: LAB | Facility: HOSPITAL | Age: 38
End: 2023-12-20
Payer: COMMERCIAL

## 2023-12-20 DIAGNOSIS — R53.82 CHRONIC FATIGUE, UNSPECIFIED: ICD-10-CM

## 2023-12-20 DIAGNOSIS — O26.53 MATERNAL HYPOTENSION SYNDROME, THIRD TRIMESTER: ICD-10-CM

## 2023-12-20 DIAGNOSIS — Z00.00 ROUTINE GENERAL MEDICAL EXAMINATION AT A HEALTH CARE FACILITY: ICD-10-CM

## 2023-12-20 DIAGNOSIS — D68.62 LUPUS ANTICOAGULANT SYNDROME: ICD-10-CM

## 2023-12-20 DIAGNOSIS — M06.9 RHEUMATOID ARTHRITIS, UNSPECIFIED: ICD-10-CM

## 2023-12-20 LAB
ALBUMIN SERPL-MCNC: 4.8 G/DL (ref 3.5–5.2)
ALBUMIN/GLOB SERPL: 1.5 G/DL
ALP SERPL-CCNC: 48 U/L (ref 39–117)
ALT SERPL W P-5'-P-CCNC: 10 U/L (ref 1–33)
ANION GAP SERPL CALCULATED.3IONS-SCNC: 11.6 MMOL/L (ref 5–15)
AST SERPL-CCNC: 20 U/L (ref 1–32)
BASOPHILS # BLD AUTO: 0.06 10*3/MM3 (ref 0–0.2)
BASOPHILS NFR BLD AUTO: 0.7 % (ref 0–1.5)
BILIRUB SERPL-MCNC: 1 MG/DL (ref 0–1.2)
BUN SERPL-MCNC: 13 MG/DL (ref 6–20)
BUN/CREAT SERPL: 14 (ref 7–25)
CALCIUM SPEC-SCNC: 10.3 MG/DL (ref 8.6–10.5)
CHLORIDE SERPL-SCNC: 103 MMOL/L (ref 98–107)
CO2 SERPL-SCNC: 24.4 MMOL/L (ref 22–29)
CREAT SERPL-MCNC: 0.93 MG/DL (ref 0.57–1)
CRP SERPL-MCNC: <0.3 MG/DL (ref 0–0.5)
DEPRECATED RDW RBC AUTO: 43.4 FL (ref 37–54)
EGFRCR SERPLBLD CKD-EPI 2021: 79.8 ML/MIN/1.73
EOSINOPHIL # BLD AUTO: 0.21 10*3/MM3 (ref 0–0.4)
EOSINOPHIL NFR BLD AUTO: 2.5 % (ref 0.3–6.2)
ERYTHROCYTE [DISTWIDTH] IN BLOOD BY AUTOMATED COUNT: 12.4 % (ref 12.3–15.4)
ERYTHROCYTE [SEDIMENTATION RATE] IN BLOOD: 5 MM/HR (ref 0–20)
GLOBULIN UR ELPH-MCNC: 3.3 GM/DL
GLUCOSE SERPL-MCNC: 91 MG/DL (ref 65–99)
HCT VFR BLD AUTO: 43.2 % (ref 34–46.6)
HGB BLD-MCNC: 15.2 G/DL (ref 12–15.9)
IMM GRANULOCYTES # BLD AUTO: 0.03 10*3/MM3 (ref 0–0.05)
IMM GRANULOCYTES NFR BLD AUTO: 0.4 % (ref 0–0.5)
LYMPHOCYTES # BLD AUTO: 3.14 10*3/MM3 (ref 0.7–3.1)
LYMPHOCYTES NFR BLD AUTO: 36.6 % (ref 19.6–45.3)
MCH RBC QN AUTO: 33.3 PG (ref 26.6–33)
MCHC RBC AUTO-ENTMCNC: 35.2 G/DL (ref 31.5–35.7)
MCV RBC AUTO: 94.7 FL (ref 79–97)
MONOCYTES # BLD AUTO: 0.59 10*3/MM3 (ref 0.1–0.9)
MONOCYTES NFR BLD AUTO: 6.9 % (ref 5–12)
NEUTROPHILS NFR BLD AUTO: 4.54 10*3/MM3 (ref 1.7–7)
NEUTROPHILS NFR BLD AUTO: 52.9 % (ref 42.7–76)
NRBC BLD AUTO-RTO: 0 /100 WBC (ref 0–0.2)
PLATELET # BLD AUTO: 245 10*3/MM3 (ref 140–450)
PMV BLD AUTO: 9.9 FL (ref 6–12)
POTASSIUM SERPL-SCNC: 4 MMOL/L (ref 3.5–5.2)
PROT SERPL-MCNC: 8.1 G/DL (ref 6–8.5)
RBC # BLD AUTO: 4.56 10*6/MM3 (ref 3.77–5.28)
SODIUM SERPL-SCNC: 139 MMOL/L (ref 136–145)
TSH SERPL DL<=0.05 MIU/L-ACNC: 0.75 UIU/ML (ref 0.27–4.2)
WBC NRBC COR # BLD AUTO: 8.57 10*3/MM3 (ref 3.4–10.8)

## 2023-12-20 PROCEDURE — 86146 BETA-2 GLYCOPROTEIN ANTIBODY: CPT | Performed by: FAMILY MEDICINE

## 2023-12-20 PROCEDURE — 85730 THROMBOPLASTIN TIME PARTIAL: CPT | Performed by: FAMILY MEDICINE

## 2023-12-20 PROCEDURE — 84481 FREE ASSAY (FT-3): CPT | Performed by: FAMILY MEDICINE

## 2023-12-20 PROCEDURE — 86480 TB TEST CELL IMMUN MEASURE: CPT | Performed by: FAMILY MEDICINE

## 2023-12-20 PROCEDURE — 85613 RUSSELL VIPER VENOM DILUTED: CPT | Performed by: FAMILY MEDICINE

## 2023-12-20 PROCEDURE — 85598 HEXAGNAL PHOSPH PLTLT NEUTRL: CPT | Performed by: FAMILY MEDICINE

## 2023-12-20 PROCEDURE — 86147 CARDIOLIPIN ANTIBODY EA IG: CPT | Performed by: FAMILY MEDICINE

## 2023-12-20 PROCEDURE — 85652 RBC SED RATE AUTOMATED: CPT | Performed by: FAMILY MEDICINE

## 2023-12-20 PROCEDURE — 85597 PHOSPHOLIPID PLTLT NEUTRALIZ: CPT | Performed by: FAMILY MEDICINE

## 2023-12-20 PROCEDURE — 86140 C-REACTIVE PROTEIN: CPT | Performed by: FAMILY MEDICINE

## 2023-12-20 PROCEDURE — 85670 THROMBIN TIME PLASMA: CPT | Performed by: FAMILY MEDICINE

## 2023-12-20 PROCEDURE — 85732 THROMBOPLASTIN TIME PARTIAL: CPT | Performed by: FAMILY MEDICINE

## 2023-12-20 PROCEDURE — 85610 PROTHROMBIN TIME: CPT | Performed by: FAMILY MEDICINE

## 2023-12-20 PROCEDURE — 80050 GENERAL HEALTH PANEL: CPT | Performed by: FAMILY MEDICINE

## 2023-12-21 LAB
T3 SERPL-MCNC: 128 NG/DL (ref 71–180)
T3FREE SERPL-MCNC: 3.9 PG/ML (ref 2–4.4)

## 2023-12-22 LAB
GAMMA INTERFERON BACKGROUND BLD IA-ACNC: 0.07 IU/ML
M TB IFN-G BLD-IMP: NEGATIVE
M TB IFN-G CD4+ T-CELLS BLD-ACNC: 0.1 IU/ML
M TBIFN-G CD4+ CD8+T-CELLS BLD-ACNC: 0.09 IU/ML
MITOGEN IGNF BLD-ACNC: >10 IU/ML
QUANTIFERON INCUBATION: NORMAL
SERVICE CMNT-IMP: NORMAL

## 2023-12-26 ENCOUNTER — TELEPHONE (OUTPATIENT)
Dept: ENDOCRINOLOGY | Facility: CLINIC | Age: 38
End: 2023-12-26
Payer: COMMERCIAL

## 2023-12-26 NOTE — TELEPHONE ENCOUNTER
Patient called stated she is feeling horrible and she recently had her TSH drawn and said it is really low. She said her most recent TSH was at [facility], [place] on 12/20. I offered patient appointments this week and she said there is no way was can come in due to work. Also offered 1/2/24, but she cannot come before 1:30PM. Please advise.

## 2023-12-27 ENCOUNTER — OFFICE VISIT (OUTPATIENT)
Dept: ENDOCRINOLOGY | Facility: CLINIC | Age: 38
End: 2023-12-27
Payer: COMMERCIAL

## 2023-12-27 VITALS
OXYGEN SATURATION: 98 % | HEIGHT: 59 IN | DIASTOLIC BLOOD PRESSURE: 78 MMHG | SYSTOLIC BLOOD PRESSURE: 124 MMHG | WEIGHT: 137 LBS | HEART RATE: 55 BPM | BODY MASS INDEX: 27.62 KG/M2

## 2023-12-27 DIAGNOSIS — R63.5 WEIGHT GAIN: ICD-10-CM

## 2023-12-27 DIAGNOSIS — E03.9 HYPOTHYROIDISM, UNSPECIFIED TYPE: Primary | ICD-10-CM

## 2023-12-27 DIAGNOSIS — R68.89 ABNORMAL ENDOCRINE LABORATORY TEST FINDING: ICD-10-CM

## 2023-12-27 LAB
APTT HEX PL PPP: 4 SEC
APTT IMM NP PPP: NORMAL SEC
APTT PPP 1:1 SALINE: NORMAL SEC
APTT PPP: 24.9 SEC
B2 GLYCOPROT1 IGA SER-ACNC: <10 SAU
B2 GLYCOPROT1 IGG SER-ACNC: <10 SGU
B2 GLYCOPROT1 IGM SER-ACNC: <10 SMU
CARDIOLIPIN IGG SER IA-ACNC: <10 GPL
CARDIOLIPIN IGM SER IA-ACNC: <10 MPL
CONFIRM APTT: 0 SEC
CONFIRM DRVVT: NORMAL SEC
DRVVT SCREEN TO CONFIRM RATIO: NORMAL RATIO
INR PPP: 1 RATIO
LABORATORY COMMENT REPORT: NORMAL
PROTHROMBIN TIME: 11 SEC
SCREEN DRVVT: 31.3 SEC
THROMBIN TIME: 18.7 SEC

## 2023-12-27 PROCEDURE — 99214 OFFICE O/P EST MOD 30 MIN: CPT | Performed by: INTERNAL MEDICINE

## 2023-12-27 RX ORDER — DEXAMETHASONE 1 MG
TABLET ORAL
Qty: 1 TABLET | Refills: 0 | Status: SHIPPED | OUTPATIENT
Start: 2023-12-27

## 2023-12-27 NOTE — PROGRESS NOTES
"Chief Complaint   Patient presents with    Hypothyroidism     Follow up         HPI   Alicia Atkinson is a 38 y.o. female had concerns including Hypothyroidism (Follow up ).     Patient reports that she recently saw her PCP due to concern for  worsening fatigue and brain fog. They completed labs which show TSH lower than prior and patient scheduled to discuss as she is worried this may be the cause. Of note, PCP change her thyroid hormone since last visit and she is now taking armour thyroid 30 mg daily instead of levothyroxine 25 mcg daily. She does think she at first felt better after the change but this is no longer the case.    Patient also reports concern for recent weight gain. She has been off daily steroids for most of this year. She did have a single steroid injection 5-6 months ago.    The following portions of the patient's history were reviewed and updated as appropriate: allergies, current medications, and past social history.    Review of Systems   Constitutional:  Positive for fatigue and unexpected weight gain.   Neurological:  Positive for memory problem.     /78   Pulse 55   Ht 149.9 cm (59\")   Wt 62.1 kg (137 lb)   SpO2 98%   BMI 27.67 kg/m²      Physical Exam      Constitutional:  well developed; well nourished  no acute distress  appears stated age   ENT/Thyroid: not examined   Eyes: Conjunctiva: clear   Respiratory:  breathing is unlabored  clear to auscultation bilaterally   Cardiovascular:  regular rate and rhythm   Chest:  Not performed.   Abdomen: Not performed.   : Not performed.   Musculoskeletal: Not performed   Skin: not performed.   Neuro: normal without focal findings   Psych: mood and affect are within normal limits       Labs/Imaging   Latest Reference Range & Units 10/21/22 08:19 12/20/23 12:23   TSH Baseline 0.270 - 4.200 uIU/mL 1.940 0.751   T3, Total 71 - 180 ng/dL  128   T3, Free 2.0 - 4.4 pg/mL  3.9      Latest Reference Range & Units 03/07/23 06:40   ACTH 7.2 - 63.3 " pg/mL 22.6   Cortisol mcg/dL 10.10   DHEA-Sulfate 57.3 - 279.2 ug/dL 53.4 (L)   (L): Data is abnormally low    Diagnoses and all orders for this visit:    1. Hypothyroidism, unspecified type (Primary)  Reviewed with patient that I would be hesitant to attribute her current symptoms to thyroid hormone as recent testing was normal. While TSH was lower in comparison to prior, physiologically, this should not impact symptoms or lack thereof. Patient reports this is the only recent change, she did initially feel better after transition to Raymond thyroid and would like to stay on combination therapy. Risks of T3 containing therapy were reviewed. Patient given samples of NP thyroid 15 mg daily. If she is feeling better following change she will contact the office for prescription and we will continue therapy and repeat labs in 6 weeks. I did recommend that patient follow up with PCP to discuss other potential causes of her symptomatic concerns.    2. Abnormal endocrine laboratory test finding  -     Cortisol - AM; Future  -     Dexamethasone Level, Serum; Future  Patient with prior low DHEA-s, this was persistent after discontinuation of steroids  AM cortisol from March is sufficient to rule out AI  Discussed screening for Cushing's- patient will complete dexamethasone suppression testing  Patient remains of DHEA supplement- we again discussed that there is minimal data to recommend routine supplementation of DHEA and that supplements are not regulated materials.  Discussed potential unknown risks.     3. Weight gain  Reviewed endocrine causes of weight gain  Recent thyroid function testing was normal, see above  Planning to complete dexamethasone suppression testing, as above    Other orders  -     dexAMETHasone (DECADRON) 1 MG tablet; Take at 11 PM night before lab draw at 8 AM  Dispense: 1 tablet; Refill: 0    Return in about 4 months (around 4/27/2024). The patient was instructed to contact the clinic with any interval  questions or concerns.    Electronically signed by: Rosibel Guardado MD   Endocrinologist    Dictated Utilizing Dragon Dictation

## 2023-12-30 LAB — CORTIS SERPL-MCNC: 0.5 MCG/DL

## 2023-12-30 PROCEDURE — 80299 QUANTITATIVE ASSAY DRUG: CPT | Performed by: INTERNAL MEDICINE

## 2023-12-30 PROCEDURE — 82533 TOTAL CORTISOL: CPT | Performed by: INTERNAL MEDICINE

## 2024-01-07 LAB — DEXAMETHASONE SERPL-MCNC: 180 NG/DL

## 2024-01-16 ENCOUNTER — HOSPITAL ENCOUNTER (OUTPATIENT)
Dept: ULTRASOUND IMAGING | Facility: HOSPITAL | Age: 39
Discharge: HOME | End: 2024-01-16
Payer: COMMERCIAL

## 2024-01-16 ENCOUNTER — HOSPITAL ENCOUNTER (OUTPATIENT)
Dept: MAMMOGRAPHY | Facility: HOSPITAL | Age: 39
Discharge: HOME | End: 2024-01-16
Payer: COMMERCIAL

## 2024-01-16 DIAGNOSIS — Z87.898 HISTORY OF LUMP OF RIGHT BREAST: ICD-10-CM

## 2024-01-16 PROCEDURE — 76642 ULTRASOUND BREAST LIMITED: CPT

## 2024-01-16 PROCEDURE — G0279 TOMOSYNTHESIS, MAMMO: HCPCS

## 2024-01-16 PROCEDURE — 77066 DX MAMMO INCL CAD BI: CPT

## 2024-01-18 NOTE — PROGRESS NOTES
Please inform patient that her left breast ultrasound and diagnostic mammogram showed probably benign findings with multiple small complex cysts in the left breast.  The radiologist has recommended follow-up left breast ultrasound in 6 months.  I would encourage patient to schedule annual exam as it has been over 1 year since she has had a breast exam unless that has been performed in another office.

## 2024-01-19 DIAGNOSIS — R92.8 ABNORMAL MAMMOGRAM: Primary | ICD-10-CM

## 2024-02-14 DIAGNOSIS — K59.04 CHRONIC IDIOPATHIC CONSTIPATION: Chronic | ICD-10-CM

## 2024-02-22 ENCOUNTER — LAB REQUISITION (OUTPATIENT)
Dept: LAB | Facility: HOSPITAL | Age: 39
End: 2024-02-22
Payer: COMMERCIAL

## 2024-02-22 DIAGNOSIS — R53.83 OTHER FATIGUE: ICD-10-CM

## 2024-02-22 DIAGNOSIS — R21 RASH AND OTHER NONSPECIFIC SKIN ERUPTION: ICD-10-CM

## 2024-02-22 DIAGNOSIS — M05.79 RHEUMATOID ARTHRITIS WITH RHEUMATOID FACTOR OF MULTIPLE SITES WITHOUT ORGAN OR SYSTEMS INVOLVEMENT: ICD-10-CM

## 2024-02-22 LAB
25(OH)D3 SERPL-MCNC: 51.6 NG/ML (ref 30–100)
ALBUMIN SERPL-MCNC: 4.4 G/DL (ref 3.5–5.2)
ALBUMIN/GLOB SERPL: 1.8 G/DL
ALP SERPL-CCNC: 37 U/L (ref 39–117)
ALT SERPL W P-5'-P-CCNC: 9 U/L (ref 1–33)
ANION GAP SERPL CALCULATED.3IONS-SCNC: 11.5 MMOL/L (ref 5–15)
AST SERPL-CCNC: 14 U/L (ref 1–32)
BASOPHILS # BLD AUTO: 0.03 10*3/MM3 (ref 0–0.2)
BASOPHILS NFR BLD AUTO: 0.6 % (ref 0–1.5)
BILIRUB SERPL-MCNC: 1 MG/DL (ref 0–1.2)
BUN SERPL-MCNC: 12 MG/DL (ref 6–20)
BUN/CREAT SERPL: 16.4 (ref 7–25)
CALCIUM SPEC-SCNC: 9 MG/DL (ref 8.6–10.5)
CHLORIDE SERPL-SCNC: 102 MMOL/L (ref 98–107)
CO2 SERPL-SCNC: 25.5 MMOL/L (ref 22–29)
CREAT SERPL-MCNC: 0.73 MG/DL (ref 0.57–1)
CRP SERPL-MCNC: <0.3 MG/DL (ref 0–0.5)
DEPRECATED RDW RBC AUTO: 41.4 FL (ref 37–54)
EGFRCR SERPLBLD CKD-EPI 2021: 106.8 ML/MIN/1.73
EOSINOPHIL # BLD AUTO: 0.2 10*3/MM3 (ref 0–0.4)
EOSINOPHIL NFR BLD AUTO: 3.9 % (ref 0.3–6.2)
ERYTHROCYTE [DISTWIDTH] IN BLOOD BY AUTOMATED COUNT: 12 % (ref 12.3–15.4)
ERYTHROCYTE [SEDIMENTATION RATE] IN BLOOD: <1 MM/HR (ref 0–20)
GLOBULIN UR ELPH-MCNC: 2.4 GM/DL
GLUCOSE SERPL-MCNC: 77 MG/DL (ref 65–99)
HCT VFR BLD AUTO: 37.2 % (ref 34–46.6)
HGB BLD-MCNC: 13.3 G/DL (ref 12–15.9)
IMM GRANULOCYTES # BLD AUTO: 0.02 10*3/MM3 (ref 0–0.05)
IMM GRANULOCYTES NFR BLD AUTO: 0.4 % (ref 0–0.5)
IRON 24H UR-MRATE: 102 MCG/DL (ref 37–145)
IRON SATN MFR SERPL: 28 % (ref 20–50)
LYMPHOCYTES # BLD AUTO: 2.12 10*3/MM3 (ref 0.7–3.1)
LYMPHOCYTES NFR BLD AUTO: 40.8 % (ref 19.6–45.3)
MCH RBC QN AUTO: 34 PG (ref 26.6–33)
MCHC RBC AUTO-ENTMCNC: 35.8 G/DL (ref 31.5–35.7)
MCV RBC AUTO: 95.1 FL (ref 79–97)
MONOCYTES # BLD AUTO: 0.4 10*3/MM3 (ref 0.1–0.9)
MONOCYTES NFR BLD AUTO: 7.7 % (ref 5–12)
NEUTROPHILS NFR BLD AUTO: 2.42 10*3/MM3 (ref 1.7–7)
NEUTROPHILS NFR BLD AUTO: 46.6 % (ref 42.7–76)
NRBC BLD AUTO-RTO: 0 /100 WBC (ref 0–0.2)
PLATELET # BLD AUTO: 189 10*3/MM3 (ref 140–450)
PMV BLD AUTO: 8.9 FL (ref 6–12)
POTASSIUM SERPL-SCNC: 3.9 MMOL/L (ref 3.5–5.2)
PROT SERPL-MCNC: 6.8 G/DL (ref 6–8.5)
RBC # BLD AUTO: 3.91 10*6/MM3 (ref 3.77–5.28)
SODIUM SERPL-SCNC: 139 MMOL/L (ref 136–145)
TIBC SERPL-MCNC: 367 MCG/DL (ref 298–536)
TRANSFERRIN SERPL-MCNC: 246 MG/DL (ref 200–360)
VIT B12 BLD-MCNC: 571 PG/ML (ref 211–946)
WBC NRBC COR # BLD AUTO: 5.19 10*3/MM3 (ref 3.4–10.8)

## 2024-02-22 PROCEDURE — 82306 VITAMIN D 25 HYDROXY: CPT | Performed by: INTERNAL MEDICINE

## 2024-02-22 PROCEDURE — 83540 ASSAY OF IRON: CPT | Performed by: INTERNAL MEDICINE

## 2024-02-22 PROCEDURE — 86038 ANTINUCLEAR ANTIBODIES: CPT | Performed by: INTERNAL MEDICINE

## 2024-02-22 PROCEDURE — 84466 ASSAY OF TRANSFERRIN: CPT | Performed by: INTERNAL MEDICINE

## 2024-02-22 PROCEDURE — 85652 RBC SED RATE AUTOMATED: CPT | Performed by: INTERNAL MEDICINE

## 2024-02-22 PROCEDURE — 80053 COMPREHEN METABOLIC PANEL: CPT | Performed by: INTERNAL MEDICINE

## 2024-02-22 PROCEDURE — 82607 VITAMIN B-12: CPT | Performed by: INTERNAL MEDICINE

## 2024-02-22 PROCEDURE — 86140 C-REACTIVE PROTEIN: CPT | Performed by: INTERNAL MEDICINE

## 2024-02-22 PROCEDURE — 85025 COMPLETE CBC W/AUTO DIFF WBC: CPT | Performed by: INTERNAL MEDICINE

## 2024-02-23 LAB — ANA SER QL: NEGATIVE

## 2024-02-28 ENCOUNTER — OFFICE VISIT (OUTPATIENT)
Dept: INTERNAL MEDICINE | Facility: CLINIC | Age: 39
End: 2024-02-28
Payer: COMMERCIAL

## 2024-02-28 VITALS
HEIGHT: 57 IN | SYSTOLIC BLOOD PRESSURE: 122 MMHG | OXYGEN SATURATION: 98 % | TEMPERATURE: 98.3 F | WEIGHT: 140 LBS | BODY MASS INDEX: 30.2 KG/M2 | HEART RATE: 88 BPM | DIASTOLIC BLOOD PRESSURE: 76 MMHG | RESPIRATION RATE: 18 BRPM

## 2024-02-28 DIAGNOSIS — Z00.00 PHYSICAL EXAM: Primary | ICD-10-CM

## 2024-02-28 DIAGNOSIS — R39.9 URINARY SYMPTOM OR SIGN: ICD-10-CM

## 2024-02-28 DIAGNOSIS — E03.9 ACQUIRED HYPOTHYROIDISM: ICD-10-CM

## 2024-02-28 LAB
BILIRUB BLD-MCNC: NEGATIVE MG/DL
CLARITY, POC: CLEAR
COLOR UR: YELLOW
EXPIRATION DATE: ABNORMAL
GLUCOSE UR STRIP-MCNC: NEGATIVE MG/DL
KETONES UR QL: NEGATIVE
LEUKOCYTE EST, POC: ABNORMAL
Lab: ABNORMAL
NITRITE UR-MCNC: NEGATIVE MG/ML
PH UR: 6.5 [PH] (ref 5–8)
PROT UR STRIP-MCNC: NEGATIVE MG/DL
RBC # UR STRIP: NEGATIVE /UL
SP GR UR: 1.01 (ref 1–1.03)
UROBILINOGEN UR QL: NORMAL

## 2024-02-28 PROCEDURE — 81003 URINALYSIS AUTO W/O SCOPE: CPT | Performed by: NURSE PRACTITIONER

## 2024-02-28 PROCEDURE — 99396 PREV VISIT EST AGE 40-64: CPT | Performed by: NURSE PRACTITIONER

## 2024-02-28 RX ORDER — FLUTICASONE PROPIONATE 50 MCG
2 SPRAY, SUSPENSION (ML) NASAL DAILY
Qty: 16 G | Refills: 1 | Status: SHIPPED | OUTPATIENT
Start: 2024-02-28
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
## 2024-03-02 LAB
BACTERIA UR CULT: ABNORMAL
BACTERIA UR CULT: ABNORMAL
OTHER ANTIBIOTIC SUSC ISLT: ABNORMAL

## 2024-03-04 ENCOUNTER — TELEPHONE (OUTPATIENT)
Dept: INTERNAL MEDICINE | Facility: CLINIC | Age: 39
End: 2024-03-04
Payer: COMMERCIAL

## 2024-03-04 RX ORDER — NITROFURANTOIN 25; 75 MG/1; MG/1
100 CAPSULE ORAL EVERY 12 HOURS SCHEDULED
Qty: 14 CAPSULE | Refills: 0 | Status: SHIPPED | OUTPATIENT
Start: 2024-03-04 | End: 2024-03-11

## 2024-03-04 NOTE — TELEPHONE ENCOUNTER
Alciia returned Juliette's call and I relayed the urine culture results. Pt stated understanding.

## 2024-03-04 NOTE — PROGRESS NOTES
I attempted to contact patient but was unable to reach her to tell her that her urine culture did grow E. coli I did prescribe Macrobid and send in Rx either she needs to have labs drawn before starting antibiotic or wait a week after stopping the antibiotic before getting blood work drawn.  Please contact her and let her know this information.

## 2024-04-04 RX ORDER — LEVOTHYROXINE SODIUM 0.1 MG/1
100 TABLET ORAL DAILY
Qty: 30 TABLET | Refills: 3 | OUTPATIENT
Start: 2024-04-04

## 2024-04-09 ENCOUNTER — OFFICE VISIT (OUTPATIENT)
Dept: GASTROENTEROLOGY | Facility: CLINIC | Age: 39
End: 2024-04-09
Payer: COMMERCIAL

## 2024-04-09 VITALS
WEIGHT: 147 LBS | HEIGHT: 57 IN | DIASTOLIC BLOOD PRESSURE: 80 MMHG | OXYGEN SATURATION: 98 % | SYSTOLIC BLOOD PRESSURE: 120 MMHG | HEART RATE: 74 BPM | BODY MASS INDEX: 31.71 KG/M2 | RESPIRATION RATE: 16 BRPM

## 2024-04-09 DIAGNOSIS — T40.2X5A THERAPEUTIC OPIOID INDUCED CONSTIPATION: Chronic | ICD-10-CM

## 2024-04-09 DIAGNOSIS — K59.04 CHRONIC IDIOPATHIC CONSTIPATION: Primary | Chronic | ICD-10-CM

## 2024-04-09 DIAGNOSIS — K21.9 GASTROESOPHAGEAL REFLUX DISEASE WITHOUT ESOPHAGITIS: ICD-10-CM

## 2024-04-09 DIAGNOSIS — K59.03 THERAPEUTIC OPIOID INDUCED CONSTIPATION: Chronic | ICD-10-CM

## 2024-04-09 DIAGNOSIS — B34.9 ACUTE VIRAL SYNDROME: ICD-10-CM

## 2024-04-09 DIAGNOSIS — K59.00 CONSTIPATION, UNSPECIFIED CONSTIPATION TYPE: ICD-10-CM

## 2024-04-09 PROCEDURE — 99214 OFFICE O/P EST MOD 30 MIN: CPT | Performed by: NURSE PRACTITIONER

## 2024-04-09 RX ORDER — ONDANSETRON 4 MG/1
4 TABLET, ORALLY DISINTEGRATING ORAL EVERY 8 HOURS PRN
Qty: 30 TABLET | Refills: 0 | Status: SHIPPED | OUTPATIENT
Start: 2024-04-09

## 2024-04-09 RX ORDER — OMEPRAZOLE 40 MG/1
40 CAPSULE, DELAYED RELEASE ORAL DAILY
Qty: 90 CAPSULE | Refills: 3 | Status: SHIPPED | OUTPATIENT
Start: 2024-04-09

## 2024-04-09 NOTE — PATIENT INSTRUCTIONS
Antireflux measures: Avoid fried, fatty foods, alcohol, chocolate, coffee, tea,  soft drinks, all carbonated beverages (including sparkling water), peppermint and spearmint, spicy foods, tomatoes and tomato based foods, onions, peppers, and garlic.   Other antireflux measures include weight reduction if overweight, avoiding tight clothing around the abdomen, elevating the head of the bed 6 inches with blocks under the head board, and don't drink or eat before going to bed and avoid lying down immediately after meals.  Omeprazole 40 mg 1 by mouth in the am 30 minutes before breakfast.  Zofran 4 mg 1 po every 8 hours as needed for nausea.  Low FODMAP diet - avoid all dairy. May use lactose free/dairy free alternatives.   Metamucil/Benefiber daily.   Linzess 290 mcg 1 po daily 30 minutes prior to breakfast.   Movantik 25 mg 1 po once a day.   Continue stool softeners 2 per day as needed.   May take a laxative daily as needed.   Follow up: 1 year     Low-FODMAP Eating Plan  FODMAP stands for fermentable oligosaccharides, disaccharides, monosaccharides, and polyols. These are sugars that are hard for some people to digest. A low-FODMAP eating plan may help some people who have irritable bowel syndrome (IBS) and certain other bowel (intestinal) diseases to manage their symptoms.  This meal plan can be complicated to follow. Work with a diet and nutrition specialist (dietitian) to make a low-FODMAP eating plan that is right for you. A dietitian can help make sure that you get enough nutrition from this diet.  What are tips for following this plan?  Reading food labels  Check labels for hidden FODMAPs such as:  High-fructose syrup.  Honey.  Agave.  Natural fruit flavors.  Onion or garlic powder.  Choose low-FODMAP foods that contain 3-4 grams of fiber per serving.  Check food labels for serving sizes. Eat only one serving at a time to make sure FODMAP levels stay low.  Shopping  Shop with a list of foods that are  recommended on this diet and make a meal plan.  Meal planning  Follow a low-FODMAP eating plan for up to 6 weeks, or as told by your health care provider or dietitian.  To follow the eating plan:  Eliminate high-FODMAP foods from your diet completely. Choose only low-FODMAP foods to eat. You will do this for 2-6 weeks.  Gradually reintroduce high-FODMAP foods into your diet one at a time. Most people should wait a few days before introducing the next new high-FODMAP food into their meal plan. Your dietitian can recommend how quickly you may reintroduce foods.  Keep a daily record of what and how much you eat and drink. Make note of any symptoms that you have after eating.  Review your daily record with a dietitian regularly to identify which foods you can eat and which foods you should avoid.  General tips  Drink enough fluid each day to keep your urine pale yellow.  Avoid processed foods. These often have added sugar and may be high in FODMAPs.  Avoid most dairy products, whole grains, and sweeteners.  Work with a dietitian to make sure you get enough fiber in your diet.  Avoid high FODMAP foods at meals to manage symptoms.     Recommended foods  Fruits  Bananas, oranges, tangerines, georgina, limes, blueberries, raspberries, strawberries, grapes, cantaloupe, honeydew melon, kiwi, papaya, passion fruit, and pineapple. Limited amounts of dried cranberries, banana chips, and shredded coconut.  Vegetables  Eggplant, zucchini, cucumber, peppers, green beans, bean sprouts, lettuce, arugula, kale, Swiss chard, spinach, miquel greens, bok alejandra, summer squash, potato, and tomato. Limited amounts of corn, carrot, and sweet potato. Green parts of scallions.  Grains  Gluten-free grains, such as rice, oats, buckwheat, quinoa, corn, polenta, and millet. Gluten-free pasta, bread, or cereal. Rice noodles. Corn tortillas.  Meats and other proteins  Unseasoned beef, pork, poultry, or fish. Eggs. Mascorro. Tofu (firm) and tempeh.  "Limited amounts of nuts and seeds, such as almonds, walnuts, brazil nuts, pecans, peanuts, nut butters, pumpkin seeds, mike seeds, and sunflower seeds.  Dairy  Lactose-free milk, yogurt, and kefir. Lactose-free cottage cheese and ice cream. Non-dairy milks, such as almond, coconut, hemp, and rice milk. Non-dairy yogurt. Limited amounts of goat cheese, brie, mozzarella, parmesan, swiss, and other hard cheeses.  Fats and oils  Butter-free spreads. Vegetable oils, such as olive, canola, and sunflower oil.  Seasoning and other foods  Artificial sweeteners with names that do not end in \"ol,\" such as aspartame, saccharine, and stevia. Maple syrup, white table sugar, raw sugar, brown sugar, and molasses. Mayonnaise, soy sauce, and tamari. Fresh basil, coriander, parsley, rosemary, and thyme.  Beverages  Water and mineral water. Sugar-sweetened soft drinks. Small amounts of orange juice or cranberry juice. Black and green tea. Most dry flaco. Coffee.  The items listed above may not be a complete list of foods and beverages you can eat. Contact a dietitian for more information.     Foods to avoid  Fruits  Fresh, dried, and juiced forms of apple, pear, watermelon, peach, plum, cherries, apricots, blackberries, boysenberries, figs, nectarines, and constanza. Avocado.  Vegetables  Chicory root, artichoke, asparagus, cabbage, snow peas, [place] sprouts, broccoli, sugar snap peas, mushrooms, celery, and cauliflower. Onions, garlic, leeks, and the white part of scallions.  Grains  Wheat, including kamut, durum, and semolina. Barley and bulgur. Couscous. Wheat-based cereals. Wheat noodles, bread, crackers, and pastries.  Meats and other proteins  Fried or fatty meat. Sausage. Cashews and pistachios. Soybeans, baked beans, black beans, chickpeas, kidney beans, reid beans, navy beans, lentils, black-eyed peas, and split peas.  Dairy  Milk, yogurt, ice cream, and soft cheese. Cream and sour cream. Milk-based sauces. Custard. Buttermilk. " Soy milk.  Seasoning and other foods  Any sugar-free gum or candy. Foods that contain artificial sweeteners such as sorbitol, mannitol, isomalt, or xylitol. Foods that contain honey, high-fructose corn syrup, or agave. Bouillon, vegetable stock, beef stock, and chicken stock. Garlic and onion powder. Condiments made with onion, such as hummus, chutney, pickles, relish, salad dressing, and salsa. Tomato paste.  Beverages  Chicory-based drinks. Coffee substitutes. Chamomile tea. Fennel tea. Sweet or fortified flaco such as port or josefa. Diet soft drinks made with isomalt, mannitol, maltitol, sorbitol, or xylitol. Apple, pear, and constanza juice. Juices with high-fructose corn syrup.  The items listed above may not be a complete list of foods and beverages you should avoid. Contact a dietitian for more information.  Summary  FODMAP stands for fermentable oligosaccharides, disaccharides, monosaccharides, and polyols. These are sugars that are hard for some people to digest.  A low-FODMAP eating plan is a short-term diet that helps to ease symptoms of certain bowel diseases.  The eating plan usually lasts up to 6 weeks. After that, high-FODMAP foods are reintroduced gradually and one at a time. This can help you find out which foods may be causing symptoms.  A low-FODMAP eating plan can be complicated. It is best to work with a dietitian who has experience with this type of plan.  This information is not intended to replace advice given to you by your health care provider. Make sure you discuss any questions you have with your health care provider.  Document Revised: 05/06/2021 Document Reviewed: 05/06/2021  Elsevier Patient Education © 2022 [employer]

## 2024-04-09 NOTE — PROGRESS NOTES
Follow Up Note     Date: 2024   Patient Name: Alicia Atkinson  MRN: [id number]  : 1985     Primary Care Provider: Megan Hunter APRN     Chief Complaint   Patient presents with    Constipation     History of present illness:   2024  Alicia Atkinson is a 38 y.o. female who is here today for follow up for constipation. It has been doing much better. She is taking Linzess 290 daily along with Movantik, stool softeners and Benefiber with reasonable control of constipation. Reflux well controlled. No GI bleeding.     Interval History:  10/9/2023  Alicia Atkinson is a 38 y.o. female who is here today for follow up for constipation. She changed insurance and her copay is now $75 for Trulance, but the Trulance is not working as well as it used to. She is also taking the Movantik, 3 stool softeners in the morning and in the evening, as well as laxatives as needed and may have a bowel movement every few days. She is on Suboxone. She has stopped Mounjaro now as she is at her goal weight. Denies GI bleeding.     3/31/2021  Alicia Atkinson is a 35 y.o. female who is here today for follow up regarding constipation, Hepatitis C and recent colonoscopy. She has had constipation throughout her life. Currently she is only having BMs after taking Ex lax chocolate bar. She has tried senokot, miralax, stool softeners and Linzess (unsure of dosage) in the past without relief. Has history of needing to manually disimpact. She does take Suboxone daily as prescribed by her physician. She has been working on losing weight with diet modification, low carb diet. Has lost 3 lbs since her last visit approx 1 month ago. She had an episode recently of bloating which was more prominent on her left abdomen, pain and rectal bleeding. Also had colonoscopy recently and would like to discuss those results.      For treatment of hepatitis C infection, she has been taking mavyret 3 tabs PO once daily. She has not missed any doses of this  medication. Has not noticed any side effects with it. She is on week 4/8 of treatment.       2/9/2021  She has chronic constipation since she was a teenager. Her bowel movements are like once a week. Associated bloating and occasional cramps diffuse. She takes laxative bar every week. She was on subaxone since year or so. She did mialax every day , did not help. Tried dulcolax when she is severely constipated that causes significant cramps. She also has nausea, but no vomiting.      She will see red blood in wipes occasionally. No recent change in bowel habit, hematochezia or melena.  Weight is stable. Pt denies  odynophagia or dysphagia. There is history of acid reflux on ppi daily. There is no history of anemia. No prior history of EGD. Last colonoscopy was 2010 and had two polyps removed. Family history of colon cancer ; Grand father and uncle had colon cancer. No history of any abdominal surgery. Denies alcohol abuse or cigarette smoking. She still vapes.   She had recent  blood work done that was positive for hep c antibody. She had prior history of substance abuse and she is clean.   She has RA and was on hunira. Humira on hold now as her hep c antibody came out postive.     Subjective      Past Medical History:   Diagnosis Date    ADD (attention deficit disorder)     Anxiety     Body piercing     both ears    Borderline personality disorder     Chlamydia 2003    Depression     Ectopic pregnancy     Fibroid     Fibromyalgia syndrome     Full dentures     Hepatitis C     hx of.  Negative after treatment.    Hypothyroidism     Lupus (systemic lupus erythematosus)     Migraine headache     Osteoarthritis     Pancreatitis     PMS (premenstrual syndrome)     Preeclampsia     RA (rheumatoid arthritis)     Recurrent pregnancy loss, antepartum condition or complication     Seizures     withdrawing from opiates/benzos    Tachycardia     Tattoos     Trauma     Varicella     White matter abnormality on MRI of brain       Past Surgical History:   Procedure Laterality Date    BREAST BIOPSY      COLONOSCOPY N/A 2021    Procedure: COLONOSCOPY WITH BIOPSY AND POLYPECTOMY;  Surgeon: Rui Palacios MD;  Location: [facility] ENDOSCOPY;  Service: Gastroenterology;  Laterality: N/A;    CYST REMOVAL      back     D & C HYSTEROSCOPY ENDOMETRIAL ABLATION N/A 2023    Procedure: DILATATION AND CURETTAGE HYSTEROSCOPY NOVASURE ENDOMETRIAL ABLATION;  Surgeon: Rhianna Brambila MD;  Location: [facility] OR;  Service: Obstetrics/Gynecology;  Laterality: N/A;    DILATATION AND CURETTAGE      MOUTH SURGERY      teeth removal    TONSILLECTOMY AND ADENOIDECTOMY      TUBAL ABDOMINAL LIGATION       Family History   Problem Relation Age of Onset    Dementia Mother     Stroke Mother     Diabetes Mother     COPD Mother     Migraines Mother     Alcohol abuse Mother     Drug abuse Mother     Rheum arthritis Father     Lymphoma Father     COPD Father     Drug abuse Brother     Pancreatitis Brother     Diabetes Brother     Drug abuse Brother     Migraines Daughter     Constipation Daughter     Diabetes Paternal Grandmother     Colon cancer Paternal Grandfather     Cancer Paternal Grandfather     Aneurysm Paternal Grandfather         brain    Cirrhosis Neg Hx     Liver cancer Neg Hx     Breast cancer Neg Hx      Social History     Socioeconomic History    Marital status:    Tobacco Use    Smoking status: Former     Current packs/day: 0.00     Average packs/day: 1.5 packs/day for 30.0 years (45.0 ttl pk-yrs)     Types: Electronic Cigarette, Cigarettes     Start date:      Quit date:      Years since quittin.2    Smokeless tobacco: Never    Tobacco comments:     Vape   Vaping Use    Vaping status: Every Day    Start date: 2020    Substances: Nicotine, Flavoring    Devices: Disposable, Refillable tank   Substance and Sexual Activity    Alcohol use: Yes     Comment: social    Drug use: Yes     Types: Heroin     Comment:  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    Sexual activity: Defer       Current Outpatient Medications:     acetaminophen (TYLENOL) 500 MG tablet, Take 2 tablets by mouth Every 8 (Eight) Hours As Needed for Mild Pain or Moderate Pain., Disp: 60 tablet, Rfl: 0    baclofen (LIORESAL) 20 MG tablet, Take 1 tablet by mouth At Night As Needed for Muscle Spasms., Disp: , Rfl:     buprenorphine-naloxone (SUBOXONE) 8-2 MG per SL tablet, Place 0.25 tablets under the tongue Daily., Disp: , Rfl:     Etanercept (Enbrel Mini) 50 MG/ML solution cartridge, Inject 50 mg under the skin into the appropriate area as directed 1 (One) Time Per Week., Disp: 4 mL, Rfl: 5    Etanercept (Enbrel Mini) 50 MG/ML solution cartridge, Inject one injection Every 7 (Seven) Days as directed for RA symptoms, Disp: 10 mL, Rfl: 3    fluticasone (FLONASE) 50 MCG/ACT nasal spray, Instill 2 sprays into each nostril Daily., Disp: 16 g, Rfl: 1    furosemide (LASIX) 20 MG tablet, Take 1 tablet by mouth Daily., Disp: 90 tablet, Rfl: 1    gabapentin (NEURONTIN) 800 MG tablet, Take 1 tablet by mouth 2 (Two) Times a Day., Disp: , Rfl: 3    hydroxychloroquine (PLAQUENIL) 200 MG tablet, Take 1 tablet by mouth 2 (Two) Times a Day., Disp: 60 tablet, Rfl: 3    ibuprofen (ADVIL,MOTRIN) 800 MG tablet, Take 1 tablet by mouth Every 8 (Eight) Hours As Needed for Mild Pain., Disp: 30 tablet, Rfl: 0    linaclotide (LINZESS) 290 MCG capsule capsule, Take 1 capsule by mouth Every Morning Before Breakfast., Disp: 90 capsule, Rfl: 3    loratadine (CLARITIN) 10 MG tablet, Take 1 tablet by mouth Daily., Disp: , Rfl:     Multiple Vitamins-Minerals (MULTIVITAMIN ADULT EXTRA C PO), Take 1 tablet by mouth., Disp: , Rfl:     omeprazole (priLOSEC) 40 MG capsule, Take 1 capsule by mouth Daily., Disp: 90 capsule, Rfl: 3    ondansetron ODT (ZOFRAN-ODT) 4 MG disintegrating tablet, Place 1 tablet on the tongue Every 8 (Eight) Hours As Needed for Nausea or Vomiting., Disp: 30 tablet, Rfl: 0     "Thyroid (ARMOUR THYROID) 30 MG PO tablet, Take 1 tablet by mouth Daily., Disp: 90 tablet, Rfl: 2    Naloxegol Oxalate (Movantik) 25 MG tablet, Take 1 tablet by mouth Every Morning., Disp: 90 tablet, Rfl: 3    Sennosides (Chocolated Laxative) 15 MG chewable tablet, Chew. 4 squares per week (Patient not taking: Reported on 4/9/2024), Disp: , Rfl:     Allergies   Allergen Reactions    Iodine Hives    Adhesive Tape Unknown - Low Severity     Blisters skin  Other reaction(s): Unknown - Low Severity      Latex Hives     The following portions of the patient's history were reviewed and updated as appropriate: allergies, current medications, past family history, past medical history, past social history, past surgical history and problem list.  Objective     Physical Exam  Vitals and nursing note reviewed.   Constitutional:       General: She is not in acute distress.     Appearance: Normal appearance. She is well-developed.   HENT:      Head: Normocephalic and atraumatic.      Mouth/Throat:      Mouth: Mucous membranes are not pale, not dry and not cyanotic.   Eyes:      General: Lids are normal.   Neck:      Trachea: Trachea normal.   Cardiovascular:      Rate and Rhythm: Normal rate.   Pulmonary:      Effort: Pulmonary effort is normal. No respiratory distress.      Breath sounds: Normal breath sounds.   Abdominal:      Tenderness: There is no abdominal tenderness.   Skin:     General: Skin is warm and dry.   Neurological:      Mental Status: She is alert and oriented to person, place, and time.   Psychiatric:         Mood and Affect: Mood normal.         Speech: Speech normal.         Behavior: Behavior normal. Behavior is cooperative.       Vitals:    04/09/24 1438   BP: 120/80   Pulse: 74   Resp: 16   SpO2: 98%   Weight: 66.7 kg (147 lb)   Height: 144.8 cm (57\")     Body mass index is 31.81 kg/m².     Results Review:   I reviewed the patient's new clinical results.    Office Visit on 02/28/2024   Component Date Value " Ref Range Status   Lab Requisition on 02/22/2024   Component Date Value Ref Range Status    Vitamin B-12 02/22/2024 571  211 - 946 pg/mL Final    25 Hydroxy, Vitamin D 02/22/2024 51.6  30.0 - 100.0 ng/ml Final    C-Reactive Protein 02/22/2024 <0.30  0.00 - 0.50 mg/dL Final    Sed Rate 02/22/2024 <1  0 - 20 mm/hr Final    Glucose 02/22/2024 77  65 - 99 mg/dL Final    BUN 02/22/2024 12  6 - 20 mg/dL Final    Creatinine 02/22/2024 0.73  0.57 - 1.00 mg/dL Final    Sodium 02/22/2024 139  136 - 145 mmol/L Final    Potassium 02/22/2024 3.9  3.5 - 5.2 mmol/L Final    Chloride 02/22/2024 102  98 - 107 mmol/L Final    CO2 02/22/2024 25.5  22.0 - 29.0 mmol/L Final    Calcium 02/22/2024 9.0  8.6 - 10.5 mg/dL Final    Total Protein 02/22/2024 6.8  6.0 - 8.5 g/dL Final    Albumin 02/22/2024 4.4  3.5 - 5.2 g/dL Final    ALT (SGPT) 02/22/2024 9  1 - 33 U/L Final    AST (SGOT) 02/22/2024 14  1 - 32 U/L Final    Alkaline Phosphatase 02/22/2024 37 (L)  39 - 117 U/L Final    Total Bilirubin 02/22/2024 1.0  0.0 - 1.2 mg/dL Final    Globulin 02/22/2024 2.4  gm/dL Final    A/G Ratio 02/22/2024 1.8  g/dL Final    BUN/Creatinine Ratio 02/22/2024 16.4  7.0 - 25.0 Final    Anion Gap 02/22/2024 11.5  5.0 - 15.0 mmol/L Final    eGFR 02/22/2024 106.8  >60.0 mL/min/1.73 Final    PEGGY Direct 02/22/2024 Negative  Negative Final    Iron 02/22/2024 102  37 - 145 mcg/dL Final    Iron Saturation (TSAT) 02/22/2024 28  20 - 50 % Final    Transferrin 02/22/2024 246  200 - 360 mg/dL Final    TIBC 02/22/2024 367  298 - 536 mcg/dL Final    WBC 02/22/2024 5.19  3.40 - 10.80 10*3/mm3 Final    RBC 02/22/2024 3.91  3.77 - 5.28 10*6/mm3 Final    Hemoglobin 02/22/2024 13.3  12.0 - 15.9 g/dL Final    Hematocrit 02/22/2024 37.2  34.0 - 46.6 % Final    MCV 02/22/2024 95.1  79.0 - 97.0 fL Final    MCH 02/22/2024 34.0 (H)  26.6 - 33.0 pg Final    MCHC 02/22/2024 35.8 (H)  31.5 - 35.7 g/dL Final    RDW 02/22/2024 12.0 (L)  12.3 - 15.4 % Final    RDW-SD 02/22/2024 41.4   37.0 - 54.0 fl Final    MPV 02/22/2024 8.9  6.0 - 12.0 fL Final    Platelets 02/22/2024 189  140 - 450 10*3/mm3 Final      US Abdomen Complete     Result Date: 2/25/2021  Cholecystectomy. Otherwise, unremarkable ultrasound of the abdomen.           Colonoscopy was completed by Dr. Palacios on 3/1/2021   - Preparation of the colon was fair.  - Four 2 to 3 mm polyps at the recto-sigmoid colon, removed with a cold biopsy forceps. Resected and retrieved. Clinically hyperplastic  - Stool in the entire examined colon.  - Biopsies were taken with a cold forceps from the right colon, left colon and transverse colon for evaluation of microscopic colitis.  - No endoscopic signs of colitis or ileitis  - Pathology pathology showed ileal mucosa with no significant histopathologic abnormality of the terminal ileal biopsy, random colon biopsies were normal, rectosigmoid polyps x4 were hyperplastic.     Assessment / Plan      1. Chronic idiopathic constipation  2. Therapeutic opioid induced constipation  3. Constipation, unspecified constipation type  Constipation reasonably controlled with Linzess 290 mcg daily and Movantik daily and 2 stool softeners per day.  No GI bleeding.  She is on Suboxone, which is likely contributing to her constipation. Basic labs unremarkable.  TSH normal. Complete abdominal ultrasound unremarkable. She did not do CT scan.  Colonoscopy dated 3/1/2021 unremarkable.  Biopsies unremarkable.  Likely CIC with overlapping opioid-induced constipation.  Linzess 290 mcg daily.  Movantik 25 mg 1 p.o. daily.  Continue stool softeners daily.  May take over-the-counter laxative as needed.  Low FODMAP diet.  Will reorder CTAP if symptoms worsen.    - linaclotide (LINZESS) 290 MCG capsule capsule; Take 1 capsule by mouth Every Morning Before Breakfast.  Dispense: 90 capsule; Refill: 3  - Naloxegol Oxalate (Movantik) 25 MG tablet; Take 1 tablet by mouth Every Morning.  Dispense: 90 tablet; Refill: 3    4.  Gastroesophageal reflux disease without esophagitis  Reflux reasonably controlled with omeprazole 40 mg daily.  Continue same for now.  Denies difficulty swallowing.  Antireflux measures.  EGD if symptoms worsen or has difficulty swallowing.    - omeprazole (priLOSEC) 40 MG capsule; Take 1 capsule by mouth Daily.  Dispense: 90 capsule; Refill: 3  - ondansetron ODT (ZOFRAN-ODT) 4 MG disintegrating tablet; Place 1 tablet on the tongue Every 8 (Eight) Hours As Needed for Nausea or Vomiting.  Dispense: 30 tablet; Refill: 0    Previous History:  5. History of hepatitis C  6. Overweight BMI 27.47  She had been taking Mounjaro and has lost about 48 pounds since the end of January 2023.  This is intentional.  She is at her goal weight and is now off Mounjaro and is continuing to watch her diet and has maintained weight loss.  She has a history of hepatitis C and has completed treatment with Mavyret x8 weeks.  HCVRNA quantitative PCR with HCV not detected 12 weeks after completing treatment.  Previous hepatitis C FibroSure with F0/no fibrosis.  HIV test negative.  She is immune to hepatitis A and hepatitis B.  Continue low-fat diet, exercise and weight reduction.     7. Encounter for screening for malignant neoplasm of colon  Colonoscopy dated 3/1/2021 with 4 polyps removed, hyperplastic.    She will need to have colonoscopy for colon cancer screening in 10 years, 2031, or sooner if needed.    Patient Instructions   Antireflux measures: Avoid fried, fatty foods, alcohol, chocolate, coffee, tea,  soft drinks, all carbonated beverages (including sparkling water), peppermint and spearmint, spicy foods, tomatoes and tomato based foods, onions, peppers, and garlic.   Other antireflux measures include weight reduction if overweight, avoiding tight clothing around the abdomen, elevating the head of the bed 6 inches with blocks under the head board, and don't drink or eat before going to bed and avoid lying down immediately after  meals.  Omeprazole 40 mg 1 by mouth in the am 30 minutes before breakfast.  Zofran 4 mg 1 po every 8 hours as needed for nausea.  Low FODMAP diet - avoid all dairy. May use lactose free/dairy free alternatives.   Metamucil/Benefiber daily.   Linzess 290 mcg 1 po daily 30 minutes prior to breakfast.   Movantik 25 mg 1 po once a day.   Continue stool softeners 2 per day as needed.   May take a laxative daily as needed.   Follow up: 1 year     Low-FODMAP Eating Plan  FODMAP stands for fermentable oligosaccharides, disaccharides, monosaccharides, and polyols. These are sugars that are hard for some people to digest. A low-FODMAP eating plan may help some people who have irritable bowel syndrome (IBS) and certain other bowel (intestinal) diseases to manage their symptoms.  This meal plan can be complicated to follow. Work with a diet and nutrition specialist (dietitian) to make a low-FODMAP eating plan that is right for you. A dietitian can help make sure that you get enough nutrition from this diet.  What are tips for following this plan?  Reading food labels  Check labels for hidden FODMAPs such as:  High-fructose syrup.  Honey.  Agave.  Natural fruit flavors.  Onion or garlic powder.  Choose low-FODMAP foods that contain 3-4 grams of fiber per serving.  Check food labels for serving sizes. Eat only one serving at a time to make sure FODMAP levels stay low.  Shopping  Shop with a list of foods that are recommended on this diet and make a meal plan.  Meal planning  Follow a low-FODMAP eating plan for up to 6 weeks, or as told by your health care provider or dietitian.  To follow the eating plan:  Eliminate high-FODMAP foods from your diet completely. Choose only low-FODMAP foods to eat. You will do this for 2-6 weeks.  Gradually reintroduce high-FODMAP foods into your diet one at a time. Most people should wait a few days before introducing the next new high-FODMAP food into their meal plan. Your dietitian can recommend  how quickly you may reintroduce foods.  Keep a daily record of what and how much you eat and drink. Make note of any symptoms that you have after eating.  Review your daily record with a dietitian regularly to identify which foods you can eat and which foods you should avoid.  General tips  Drink enough fluid each day to keep your urine pale yellow.  Avoid processed foods. These often have added sugar and may be high in FODMAPs.  Avoid most dairy products, whole grains, and sweeteners.  Work with a dietitian to make sure you get enough fiber in your diet.  Avoid high FODMAP foods at meals to manage symptoms.     Recommended foods  Fruits  Bananas, oranges, tangerines, georgina, limes, blueberries, raspberries, strawberries, grapes, cantaloupe, honeydew melon, kiwi, papaya, passion fruit, and pineapple. Limited amounts of dried cranberries, banana chips, and shredded coconut.  Vegetables  Eggplant, zucchini, cucumber, peppers, green beans, bean sprouts, lettuce, arugula, kale, Swiss chard, spinach, miquel greens, bok alejandra, summer squash, potato, and tomato. Limited amounts of corn, carrot, and sweet potato. Green parts of scallions.  Grains  Gluten-free grains, such as rice, oats, buckwheat, quinoa, corn, polenta, and millet. Gluten-free pasta, bread, or cereal. Rice noodles. Corn tortillas.  Meats and other proteins  Unseasoned beef, pork, poultry, or fish. Eggs. Mascorro. Tofu (firm) and tempeh. Limited amounts of nuts and seeds, such as almonds, walnuts, brazil nuts, pecans, peanuts, nut butters, pumpkin seeds, mike seeds, and sunflower seeds.  Dairy  Lactose-free milk, yogurt, and kefir. Lactose-free cottage cheese and ice cream. Non-dairy milks, such as almond, coconut, hemp, and rice milk. Non-dairy yogurt. Limited amounts of goat cheese, brie, mozzarella, parmesan, swiss, and other hard cheeses.  Fats and oils  Butter-free spreads. Vegetable oils, such as olive, canola, and sunflower oil.  Seasoning and other  "foods  Artificial sweeteners with names that do not end in \"ol,\" such as aspartame, saccharine, and stevia. Maple syrup, white table sugar, raw sugar, brown sugar, and molasses. Mayonnaise, soy sauce, and tamari. Fresh basil, coriander, parsley, rosemary, and thyme.  Beverages  Water and mineral water. Sugar-sweetened soft drinks. Small amounts of orange juice or cranberry juice. Black and green tea. Most dry flaco. Coffee.  The items listed above may not be a complete list of foods and beverages you can eat. Contact a dietitian for more information.     Foods to avoid  Fruits  Fresh, dried, and juiced forms of apple, pear, watermelon, peach, plum, cherries, apricots, blackberries, boysenberries, figs, nectarines, and constanza. Avocado.  Vegetables  Chicory root, artichoke, asparagus, cabbage, snow peas, [place] sprouts, broccoli, sugar snap peas, mushrooms, celery, and cauliflower. Onions, garlic, leeks, and the white part of scallions.  Grains  Wheat, including kamut, durum, and semolina. Barley and bulgur. Couscous. Wheat-based cereals. Wheat noodles, bread, crackers, and pastries.  Meats and other proteins  Fried or fatty meat. Sausage. Cashews and pistachios. Soybeans, baked beans, black beans, chickpeas, kidney beans, reid beans, navy beans, lentils, black-eyed peas, and split peas.  Dairy  Milk, yogurt, ice cream, and soft cheese. Cream and sour cream. Milk-based sauces. Custard. Buttermilk. Soy milk.  Seasoning and other foods  Any sugar-free gum or candy. Foods that contain artificial sweeteners such as sorbitol, mannitol, isomalt, or xylitol. Foods that contain honey, high-fructose corn syrup, or agave. Bouillon, vegetable stock, beef stock, and chicken stock. Garlic and onion powder. Condiments made with onion, such as hummus, chutney, pickles, relish, salad dressing, and salsa. Tomato paste.  Beverages  Chicory-based drinks. Coffee substitutes. Chamomile tea. Fennel tea. Sweet or fortified flaco such as " port or josefa. Diet soft drinks made with isomalt, mannitol, maltitol, sorbitol, or xylitol. Apple, pear, and constanza juice. Juices with high-fructose corn syrup.  The items listed above may not be a complete list of foods and beverages you should avoid. Contact a dietitian for more information.  Summary  FODMAP stands for fermentable oligosaccharides, disaccharides, monosaccharides, and polyols. These are sugars that are hard for some people to digest.  A low-FODMAP eating plan is a short-term diet that helps to ease symptoms of certain bowel diseases.  The eating plan usually lasts up to 6 weeks. After that, high-FODMAP foods are reintroduced gradually and one at a time. This can help you find out which foods may be causing symptoms.  A low-FODMAP eating plan can be complicated. It is best to work with a dietitian who has experience with this type of plan.  This information is not intended to replace advice given to you by your health care provider. Make sure you discuss any questions you have with your health care provider.  Document Revised: 05/06/2021 Document Reviewed: 05/06/2021  Elsevier Patient Education © 2022 [employer]     Perez Germain, ATA  4/9/2024    Please note that portions of this note were completed with a voice recognition program.

## 2024-04-25 ENCOUNTER — LAB REQUISITION (OUTPATIENT)
Dept: LAB | Facility: HOSPITAL | Age: 39
End: 2024-04-25
Payer: COMMERCIAL

## 2024-04-25 DIAGNOSIS — M05.79 RHEUMATOID ARTHRITIS WITH RHEUMATOID FACTOR OF MULTIPLE SITES WITHOUT ORGAN OR SYSTEMS INVOLVEMENT: ICD-10-CM

## 2024-04-25 DIAGNOSIS — M79.7 FIBROMYALGIA: ICD-10-CM

## 2024-04-25 DIAGNOSIS — M19.90 UNSPECIFIED OSTEOARTHRITIS, UNSPECIFIED SITE: ICD-10-CM

## 2024-04-25 LAB
ALBUMIN SERPL-MCNC: 4.2 G/DL (ref 3.5–5.2)
ALBUMIN/GLOB SERPL: 1.8 G/DL
ALP SERPL-CCNC: 37 U/L (ref 39–117)
ALT SERPL W P-5'-P-CCNC: 9 U/L (ref 1–33)
ANION GAP SERPL CALCULATED.3IONS-SCNC: 9.9 MMOL/L (ref 5–15)
AST SERPL-CCNC: 18 U/L (ref 1–32)
BASOPHILS # BLD AUTO: 0.04 10*3/MM3 (ref 0–0.2)
BASOPHILS NFR BLD AUTO: 0.7 % (ref 0–1.5)
BILIRUB SERPL-MCNC: 0.8 MG/DL (ref 0–1.2)
BUN SERPL-MCNC: 13 MG/DL (ref 6–20)
BUN/CREAT SERPL: 17.8 (ref 7–25)
CALCIUM SPEC-SCNC: 9 MG/DL (ref 8.6–10.5)
CHLORIDE SERPL-SCNC: 105 MMOL/L (ref 98–107)
CO2 SERPL-SCNC: 23.1 MMOL/L (ref 22–29)
CREAT SERPL-MCNC: 0.73 MG/DL (ref 0.57–1)
CRP SERPL-MCNC: <0.3 MG/DL (ref 0–0.5)
DEPRECATED RDW RBC AUTO: 39.1 FL (ref 37–54)
EGFRCR SERPLBLD CKD-EPI 2021: 106.8 ML/MIN/1.73
EOSINOPHIL # BLD AUTO: 0.08 10*3/MM3 (ref 0–0.4)
EOSINOPHIL NFR BLD AUTO: 1.4 % (ref 0.3–6.2)
ERYTHROCYTE [DISTWIDTH] IN BLOOD BY AUTOMATED COUNT: 11.5 % (ref 12.3–15.4)
ERYTHROCYTE [SEDIMENTATION RATE] IN BLOOD: <1 MM/HR (ref 0–20)
GLOBULIN UR ELPH-MCNC: 2.3 GM/DL
GLUCOSE SERPL-MCNC: 90 MG/DL (ref 65–99)
HCT VFR BLD AUTO: 37.9 % (ref 34–46.6)
HGB BLD-MCNC: 13.8 G/DL (ref 12–15.9)
IMM GRANULOCYTES # BLD AUTO: 0.01 10*3/MM3 (ref 0–0.05)
IMM GRANULOCYTES NFR BLD AUTO: 0.2 % (ref 0–0.5)
LYMPHOCYTES # BLD AUTO: 2.11 10*3/MM3 (ref 0.7–3.1)
LYMPHOCYTES NFR BLD AUTO: 37.9 % (ref 19.6–45.3)
MCH RBC QN AUTO: 33.8 PG (ref 26.6–33)
MCHC RBC AUTO-ENTMCNC: 36.4 G/DL (ref 31.5–35.7)
MCV RBC AUTO: 92.9 FL (ref 79–97)
MONOCYTES # BLD AUTO: 0.4 10*3/MM3 (ref 0.1–0.9)
MONOCYTES NFR BLD AUTO: 7.2 % (ref 5–12)
NEUTROPHILS NFR BLD AUTO: 2.92 10*3/MM3 (ref 1.7–7)
NEUTROPHILS NFR BLD AUTO: 52.6 % (ref 42.7–76)
NRBC BLD AUTO-RTO: 0 /100 WBC (ref 0–0.2)
PLATELET # BLD AUTO: 186 10*3/MM3 (ref 140–450)
PMV BLD AUTO: 9.4 FL (ref 6–12)
POTASSIUM SERPL-SCNC: 4.1 MMOL/L (ref 3.5–5.2)
PROT SERPL-MCNC: 6.5 G/DL (ref 6–8.5)
RBC # BLD AUTO: 4.08 10*6/MM3 (ref 3.77–5.28)
SODIUM SERPL-SCNC: 138 MMOL/L (ref 136–145)
WBC NRBC COR # BLD AUTO: 5.56 10*3/MM3 (ref 3.4–10.8)

## 2024-04-25 PROCEDURE — 86140 C-REACTIVE PROTEIN: CPT | Performed by: INTERNAL MEDICINE

## 2024-04-25 PROCEDURE — 85652 RBC SED RATE AUTOMATED: CPT | Performed by: INTERNAL MEDICINE

## 2024-04-25 PROCEDURE — 86480 TB TEST CELL IMMUN MEASURE: CPT | Performed by: INTERNAL MEDICINE

## 2024-04-25 PROCEDURE — 80053 COMPREHEN METABOLIC PANEL: CPT | Performed by: INTERNAL MEDICINE

## 2024-04-25 PROCEDURE — 85025 COMPLETE CBC W/AUTO DIFF WBC: CPT | Performed by: INTERNAL MEDICINE

## 2024-04-27 LAB
GAMMA INTERFERON BACKGROUND BLD IA-ACNC: 0.06 IU/ML
M TB IFN-G BLD-IMP: NEGATIVE
M TB IFN-G CD4+ BCKGRND COR BLD-ACNC: 0.06 IU/ML
M TB IFN-G CD4+CD8+ BCKGRND COR BLD-ACNC: 0.07 IU/ML
MITOGEN IGNF BCKGRD COR BLD-ACNC: >10 IU/ML
QUANTIFERON INCUBATION: NORMAL
SERVICE CMNT-IMP: NORMAL

## 2024-06-10 RX ORDER — FUROSEMIDE 20 MG/1
20 TABLET ORAL DAILY
Qty: 90 TABLET | Refills: 1 | Status: SHIPPED | OUTPATIENT
Start: 2024-06-10

## 2024-06-13 ENCOUNTER — OFFICE VISIT (OUTPATIENT)
Dept: INTERNAL MEDICINE | Facility: CLINIC | Age: 39
End: 2024-06-13
Payer: COMMERCIAL

## 2024-06-13 VITALS
SYSTOLIC BLOOD PRESSURE: 114 MMHG | BODY MASS INDEX: 31.28 KG/M2 | TEMPERATURE: 97.7 F | DIASTOLIC BLOOD PRESSURE: 79 MMHG | HEART RATE: 82 BPM | OXYGEN SATURATION: 98 % | WEIGHT: 145 LBS | RESPIRATION RATE: 18 BRPM | HEIGHT: 57 IN

## 2024-06-13 DIAGNOSIS — F41.9 ANXIETY: ICD-10-CM

## 2024-06-13 DIAGNOSIS — F90.2 ATTENTION DEFICIT HYPERACTIVITY DISORDER (ADHD), COMBINED TYPE: Primary | ICD-10-CM

## 2024-06-13 PROCEDURE — 99213 OFFICE O/P EST LOW 20 MIN: CPT | Performed by: NURSE PRACTITIONER

## 2024-06-13 NOTE — PROGRESS NOTES
Chief Complaint / Reason:      Chief Complaint   Patient presents with    ADHD     Discuss options        Subjective     History of Present Illness  The patient is a 37-year-old female who presents for evaluation of multiple medical concerns.    The patient expresses a desire to discuss potential ADHD medications, having previously been treated with Ritalin, PREET, and Concerta during her childhood. She has also been prescribed Azstarys, which she took for an extended period during her pregnancy. Currently, she is on a regimen of L-Tyrosine, initially at a dosage of 250 mg, which was subsequently increased to 750 mg, taken only on workdays. 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 She has previously tried Strattera and Wellbutrin, both of which resulted in seizures and weight gain of approximately 20 pounds. She has been evaluated for bipolar disorder on a few occasions and was subsequently diagnosed with BPD.    Supplemental Information  She was without RA medicine for almost 6 months altogether. She ended up developing antibody after being off RA medicine. She is now doing infusions.   Her mother had bipolar disorder.    History taken from: patient    PMH/FH/Social History were reviewed and updated appropriately in the electronic medical record.   Past Medical History:   Diagnosis Date    ADD (attention deficit disorder)     Anxiety     Body piercing     both ears    Borderline personality disorder     Chlamydia 2003    Depression     Ectopic pregnancy     Fibroid     Fibromyalgia syndrome     Full dentures     Hepatitis C     hx of.  Negative after treatment.    Hypothyroidism     Lupus (systemic lupus erythematosus)     Migraine headache     Osteoarthritis     Pancreatitis     PMS (premenstrual syndrome)     Preeclampsia     RA (rheumatoid arthritis)     Recurrent pregnancy loss, antepartum  condition or complication     Seizures     withdrawing from opiates/benzos    Tachycardia     Tattoos     Trauma     Varicella     White matter abnormality on MRI of brain      Past Surgical History:   Procedure Laterality Date    BREAST BIOPSY      COLONOSCOPY N/A 2021    Procedure: COLONOSCOPY WITH BIOPSY AND POLYPECTOMY;  Surgeon: Rui Palacios MD;  Location: [facility];  Service: Gastroenterology;  Laterality: N/A;    CYST REMOVAL  1991    back     D & C HYSTEROSCOPY ENDOMETRIAL ABLATION N/A 2023    Procedure: DILATATION AND CURETTAGE HYSTEROSCOPY NOVASURE ENDOMETRIAL ABLATION;  Surgeon: Rhianna Brambila MD;  Location: [facility] OR;  Service: Obstetrics/Gynecology;  Laterality: N/A;    DILATATION AND CURETTAGE      MOUTH SURGERY      teeth removal    TONSILLECTOMY AND ADENOIDECTOMY      TUBAL ABDOMINAL LIGATION       Social History     Socioeconomic History    Marital status:    Tobacco Use    Smoking status: Former     Current packs/day: 0.00     Average packs/day: 1.5 packs/day for 30.0 years (45.0 ttl pk-yrs)     Types: Electronic Cigarette, Cigarettes     Start date:      Quit date:      Years since quittin.5    Smokeless tobacco: Never    Tobacco comments:     Vape   Vaping Use    Vaping status: Every Day    Start date: 2020    Substances: Nicotine, Flavoring    Devices: Disposable, Refillable tank   Substance and Sexual Activity    Alcohol use: Yes     Comment: social    Drug use: Yes     Types: Heroin     Comment: 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    Sexual activity: Defer     Family History   Problem Relation Age of Onset    Dementia Mother     Stroke Mother     Diabetes Mother     COPD Mother     Migraines Mother     Alcohol abuse Mother     Drug abuse Mother     Rheum arthritis Father     Lymphoma Father     COPD Father     Drug abuse Brother     Pancreatitis Brother     Diabetes Brother     Drug abuse Brother     Migraines Daughter      Constipation Daughter     Diabetes Paternal Grandmother     Colon cancer Paternal Grandfather     Cancer Paternal Grandfather     Aneurysm Paternal Grandfather         brain    Cirrhosis Neg Hx     Liver cancer Neg Hx     Breast cancer Neg Hx        Review of Systems:   Review of Systems      All other systems were reviewed and are negative.  Exceptions are noted in the subjective or above.      Objective     Vital Signs  Vitals:    06/13/24 1627   BP: 114/79   Pulse: 82   Resp: 18   Temp: 97.7 °F (36.5 °C)   SpO2: 98%       Body mass index is 31.38 kg/m².  BMI is >= 30 and <35. (Class 1 Obesity). The following options were offered after discussion;: exercise counseling/recommendations and nutrition counseling/recommendations       Physical Exam  Vitals and nursing note reviewed.   Constitutional:       General: She is not in acute distress.     Appearance: She is well-developed. She is obese.   Cardiovascular:      Rate and Rhythm: Normal rate and regular rhythm.      Pulses: Normal pulses.      Heart sounds: Normal heart sounds.   Pulmonary:      Effort: Pulmonary effort is normal.      Breath sounds: Normal breath sounds. No wheezing.   Chest:      Chest wall: No tenderness.   Skin:     General: Skin is warm and dry.      Capillary Refill: Capillary refill takes less than 2 seconds.      Coloration: Skin is not pale.      Findings: No erythema or rash.   Neurological:      Mental Status: She is alert and oriented to person, place, and time.   Psychiatric:         Behavior: Behavior normal.         Thought Content: Thought content normal.         Judgment: Judgment normal.              Results Review:    I reviewed the patient's new clinical results.       Medication Review:     Current Outpatient Medications:     acetaminophen (TYLENOL) 500 MG tablet, Take 2 tablets by mouth Every 8 (Eight) Hours As Needed for Mild Pain or Moderate Pain., Disp: 60 tablet, Rfl: 0    baclofen (LIORESAL) 20 MG tablet, Take 1 tablet  by mouth At Night As Needed for Muscle Spasms., Disp: , Rfl:     buprenorphine-naloxone (SUBOXONE) 8-2 MG per SL tablet, Place 0.25 tablets under the tongue Daily., Disp: , Rfl:     fluticasone (FLONASE) 50 MCG/ACT nasal spray, Instill 2 sprays into each nostril Daily., Disp: 16 g, Rfl: 1    furosemide (LASIX) 20 MG tablet, Take 1 tablet by mouth Daily., Disp: 90 tablet, Rfl: 1    gabapentin (NEURONTIN) 800 MG tablet, Take 1 tablet by mouth 2 (Two) Times a Day., Disp: , Rfl: 3    hydroxychloroquine (PLAQUENIL) 200 MG tablet, Take 1 tablet by mouth 2 (Two) Times a Day., Disp: 60 tablet, Rfl: 3    ibuprofen (ADVIL,MOTRIN) 800 MG tablet, Take 1 tablet by mouth Every 8 (Eight) Hours As Needed for Mild Pain., Disp: 30 tablet, Rfl: 0    linaclotide (LINZESS) 290 MCG capsule capsule, Take 1 capsule by mouth Every Morning Before Breakfast., Disp: 90 capsule, Rfl: 3    loratadine (CLARITIN) 10 MG tablet, Take 1 tablet by mouth Daily., Disp: , Rfl:     Multiple Vitamins-Minerals (MULTIVITAMIN ADULT EXTRA C PO), Take 1 tablet by mouth., Disp: , Rfl:     Naloxegol Oxalate (Movantik) 25 MG tablet, Take 1 tablet by mouth Every Morning., Disp: 90 tablet, Rfl: 3    omeprazole (priLOSEC) 40 MG capsule, Take 1 capsule by mouth Daily., Disp: 90 capsule, Rfl: 3    ondansetron ODT (ZOFRAN-ODT) 4 MG disintegrating tablet, Place 1 tablet on the tongue Every 8 (Eight) Hours As Needed for Nausea or Vomiting., Disp: 30 tablet, Rfl: 0    Sennosides (Chocolated Laxative) 15 MG chewable tablet, Chew. 4 squares per week, Disp: , Rfl:     Thyroid (ARMOUR THYROID) 30 MG PO tablet, Take 1 tablet by mouth Daily., Disp: 90 tablet, Rfl: 2    Diagnoses and all orders for this visit:    Attention deficit hyperactivity disorder (ADHD), combined type    Anxiety    Discussed nonpharmacological interventions with patient and advised patient to not take anything that is not prescribed.  Assessment & Plan  1. Attention Deficit Hyperactivity Disorder  (ADHD).  The patient's blood pressure is well-regulated. A comprehensive discussion was held with the patient regarding various ADHD medications, including Strattera, Vyvanse, and Qelbree. The patient was advised to consult with Luz Maria, a specialist in [facility], regarding her ADHD medication.    Return if symptoms worsen or fail to improve.    ATA Johnson  06/13/2024    Patient or patient representative verbalized consent for the use of Ambient Listening during the visit with  ATA Johnson for chart documentation.

## 2024-06-24 ENCOUNTER — HOSPITAL ENCOUNTER (OUTPATIENT)
Dept: MAMMOGRAPHY | Facility: HOSPITAL | Age: 39
Discharge: HOME | End: 2024-06-24
Payer: COMMERCIAL

## 2024-06-24 ENCOUNTER — HOSPITAL ENCOUNTER (OUTPATIENT)
Dept: ULTRASOUND IMAGING | Facility: HOSPITAL | Age: 39
Discharge: HOME | End: 2024-06-24
Payer: COMMERCIAL

## 2024-06-24 DIAGNOSIS — R92.8 ABNORMAL MAMMOGRAM: ICD-10-CM

## 2024-06-24 PROCEDURE — G0279 TOMOSYNTHESIS, MAMMO: HCPCS

## 2024-06-24 PROCEDURE — 77065 DX MAMMO INCL CAD UNI: CPT

## 2024-06-24 PROCEDURE — 76641 ULTRASOUND BREAST COMPLETE: CPT

## 2024-06-25 NOTE — PROGRESS NOTES
Please inform patient her diagnostic mammogram and breast ultrasound returned with benign findings.  Follow-up screening mammogram is recommended in 1 year.  If she has any changes or concerns with her breasts recommend she be seen in office.

## 2024-08-13 ENCOUNTER — OFFICE VISIT (OUTPATIENT)
Dept: INTERNAL MEDICINE | Facility: CLINIC | Age: 39
End: 2024-08-13
Payer: COMMERCIAL

## 2024-08-13 ENCOUNTER — TELEPHONE (OUTPATIENT)
Dept: INTERNAL MEDICINE | Facility: CLINIC | Age: 39
End: 2024-08-13
Payer: COMMERCIAL

## 2024-08-13 VITALS
DIASTOLIC BLOOD PRESSURE: 78 MMHG | SYSTOLIC BLOOD PRESSURE: 114 MMHG | TEMPERATURE: 97.8 F | HEART RATE: 85 BPM | HEIGHT: 57 IN | BODY MASS INDEX: 29.77 KG/M2 | RESPIRATION RATE: 20 BRPM | OXYGEN SATURATION: 98 % | WEIGHT: 138 LBS

## 2024-08-13 DIAGNOSIS — D84.9 IMMUNOCOMPROMISED: ICD-10-CM

## 2024-08-13 DIAGNOSIS — R05.8 PRODUCTIVE COUGH: Primary | ICD-10-CM

## 2024-08-13 LAB
EXPIRATION DATE: NORMAL
FLUAV AG UPPER RESP QL IA.RAPID: NOT DETECTED
FLUBV AG UPPER RESP QL IA.RAPID: NOT DETECTED
INTERNAL CONTROL: NORMAL
Lab: NORMAL
SARS-COV-2 AG UPPER RESP QL IA.RAPID: NOT DETECTED

## 2024-08-13 PROCEDURE — 99213 OFFICE O/P EST LOW 20 MIN: CPT | Performed by: PHYSICIAN ASSISTANT

## 2024-08-13 PROCEDURE — 87428 SARSCOV & INF VIR A&B AG IA: CPT | Performed by: PHYSICIAN ASSISTANT

## 2024-08-13 RX ORDER — CEFDINIR 300 MG/1
300 CAPSULE ORAL 2 TIMES DAILY
Qty: 20 CAPSULE | Refills: 0 | Status: SHIPPED | OUTPATIENT
Start: 2024-08-13 | End: 2024-08-23

## 2024-08-13 NOTE — TELEPHONE ENCOUNTER
Caller: Alicia Atkinson    Relationship: Self    Best call back number: [phone]     What medication are you requesting: COUGH MEDICATION, SYRUP    What are your current symptoms: COUGHING, NOT SLEEPING AT NIGHT    How long have you been experiencing symptoms: A WEEK    Have you had these symptoms before:    [x] Yes  [] No    Have you been treated for these symptoms before:   [x] Yes  [] No    If a prescription is needed, what is your preferred pharmacy and phone number:  [facility]     Additional notes:

## 2024-08-13 NOTE — PROGRESS NOTES
Office Visit      Patient Name: Alicia Atkinson  : 1985   MRN: [id number]     Chief Complaint:    Chief Complaint   Patient presents with    Headache     Started Friday     Cough     Started Friday, productive chest and ribs are sore and tender        History of Present Illness: Alicia Atkinson is a 39 y.o. female who is here today to discuss cough and headache.     Awoke Friday morning with mild sore throat and headache. Later that day she developed cough which worsened since onset. Sore throat has mostly resolved. Cough productive for yellow to green sputum and cough feels deep. She is sore in her back and ribs from coughing. No fever or SOA. She has had some rhinorrhea and nasal congestion. Her  said she sounded like she was wheezing last night but she has not noticed wheezing herself. She had a negative COVID test early Saturday morning. She started prednisone 20 mg twice daily on  night. She has also been taking Mucinex. Nothing seems to improve the headache and it seems that coughing forcefully is worsening headache. She is taking Claritin daily. She is immunosuppressed due to treatment of lupus and RA.         Subjective      I have reviewed and the following portions of the patient's history were updated as appropriate: past family history, past medical history, past social history, past surgical history and problem list.      Current Outpatient Medications:     acetaminophen (TYLENOL) 500 MG tablet, Take 2 tablets by mouth Every 8 (Eight) Hours As Needed for Mild Pain or Moderate Pain., Disp: 60 tablet, Rfl: 0    baclofen (LIORESAL) 20 MG tablet, Take 1 tablet by mouth At Night As Needed for Muscle Spasms., Disp: , Rfl:     buprenorphine-naloxone (SUBOXONE) 8-2 MG per SL tablet, Place 0.25 tablets under the tongue Daily., Disp: , Rfl:     fluticasone (FLONASE) 50 MCG/ACT nasal spray, Instill 2 sprays into each nostril Daily., Disp: 16 g, Rfl: 1    furosemide (LASIX) 20 MG tablet, Take 1  "tablet by mouth Daily., Disp: 90 tablet, Rfl: 1    gabapentin (NEURONTIN) 800 MG tablet, Take 1 tablet by mouth 2 (Two) Times a Day., Disp: , Rfl: 3    hydroxychloroquine (PLAQUENIL) 200 MG tablet, Take 1 tablet by mouth Every 12 (Twelve) Hours., Disp: 60 tablet, Rfl: 3    ibuprofen (ADVIL,MOTRIN) 800 MG tablet, Take 1 tablet by mouth Every 8 (Eight) Hours As Needed for Mild Pain., Disp: 30 tablet, Rfl: 0    linaclotide (LINZESS) 290 MCG capsule capsule, Take 1 capsule by mouth Every Morning Before Breakfast., Disp: 90 capsule, Rfl: 3    loratadine (CLARITIN) 10 MG tablet, Take 1 tablet by mouth Daily., Disp: , Rfl:     Multiple Vitamins-Minerals (MULTIVITAMIN ADULT EXTRA C PO), Take 1 tablet by mouth., Disp: , Rfl:     Naloxegol Oxalate (Movantik) 25 MG tablet, Take 1 tablet by mouth Every Morning., Disp: 90 tablet, Rfl: 3    omeprazole (priLOSEC) 40 MG capsule, Take 1 capsule by mouth Daily., Disp: 90 capsule, Rfl: 3    ondansetron ODT (ZOFRAN-ODT) 4 MG disintegrating tablet, Place 1 tablet on the tongue Every 8 (Eight) Hours As Needed for Nausea or Vomiting., Disp: 30 tablet, Rfl: 0    Sennosides (Chocolated Laxative) 15 MG chewable tablet, Chew. 4 squares per week, Disp: , Rfl:     Thyroid (ARMOUR THYROID) 30 MG PO tablet, Take 1 tablet by mouth Daily., Disp: 90 tablet, Rfl: 2    cefdinir (OMNICEF) 300 MG capsule, Take 1 capsule by mouth 2 (Two) Times a Day for 10 days., Disp: 20 capsule, Rfl: 0    Allergies   Allergen Reactions    Iodine Hives    Adhesive Tape Unknown - Low Severity     Blisters skin  Other reaction(s): Unknown - Low Severity      Latex Hives       Objective     Vital Signs:   Vitals:    08/13/24 1012   BP: 114/78   Pulse: 85   Resp: 20   Temp: 97.8 °F (36.6 °C)   SpO2: 98%   Weight: 62.6 kg (138 lb)   Height: 144.8 cm (57\")     Body mass index is 29.86 kg/m².    Physical Exam  Vitals and nursing note reviewed.   Constitutional:       General: She is not in acute distress.     Appearance: She " is well-developed. She is not ill-appearing, toxic-appearing or diaphoretic.   HENT:      Head: Normocephalic and atraumatic.      Right Ear: Tympanic membrane, ear canal and external ear normal. There is no impacted cerumen.      Left Ear: Tympanic membrane, ear canal and external ear normal. There is no impacted cerumen.      Nose: Rhinorrhea present.      Mouth/Throat:      Mouth: Mucous membranes are moist.      Pharynx: No oropharyngeal exudate or posterior oropharyngeal erythema.      Comments: White postnasal drip present.  Eyes:      General: No scleral icterus.     Extraocular Movements: Extraocular movements intact.      Conjunctiva/sclera: Conjunctivae normal.      Pupils: Pupils are equal, round, and reactive to light.   Cardiovascular:      Rate and Rhythm: Normal rate and regular rhythm.      Heart sounds: Normal heart sounds. No murmur heard.     No friction rub. No gallop.   Pulmonary:      Effort: Pulmonary effort is normal. No respiratory distress.      Breath sounds: Normal breath sounds. No stridor. No wheezing, rhonchi or rales.   Chest:      Chest wall: No tenderness.   Musculoskeletal:         General: No tenderness or deformity. Normal range of motion.      Cervical back: Normal range of motion and neck supple. No rigidity or tenderness.      Right lower leg: No edema.      Left lower leg: No edema.   Lymphadenopathy:      Cervical: No cervical adenopathy.   Skin:     General: Skin is warm and dry.      Capillary Refill: Capillary refill takes less than 2 seconds.      Coloration: Skin is not jaundiced or pale.      Findings: No rash.   Neurological:      Mental Status: She is alert and oriented to person, place, and time.      Cranial Nerves: No cranial nerve deficit.      Sensory: No sensory deficit.      Motor: No abnormal muscle tone.      Coordination: Coordination normal.      Gait: Gait normal.      Deep Tendon Reflexes: Reflexes normal.   Psychiatric:         Mood and Affect: Mood  normal.         Behavior: Behavior normal.         Thought Content: Thought content normal.         Judgment: Judgment normal.         Common labs          12/20/2023    12:23 2/22/2024    08:20 4/25/2024    12:18   Common Labs   Glucose 91  77  90    BUN 13  12  13    Creatinine 0.93  0.73  0.73    Sodium 139  139  138    Potassium 4.0  3.9  4.1    Chloride 103  102  105    Calcium 10.3  9.0  9.0    Albumin 4.8  4.4  4.2    Total Bilirubin 1.0  1.0  0.8    Alkaline Phosphatase 48  37  37    AST (SGOT) 20  14  18    ALT (SGPT) 10  9  9    WBC 8.57  5.19  5.56    Hemoglobin 15.2  13.3  13.8    Hematocrit 43.2  37.2  37.9    Platelets 245  189  186          Assessment / Plan      Assessment/Plan:   Diagnoses and all orders for this visit:    1. Productive cough (Primary)  -     Covid-19 + Flu A&B AG, Veritor  -     XR Chest PA & Lateral  -     cefdinir (OMNICEF) 300 MG capsule; Take 1 capsule by mouth 2 (Two) Times a Day for 10 days.  Dispense: 20 capsule; Refill: 0    2. Immunocompromised       Negative for COVID and Influenza in office today. Continue daily antihistamine and Mucinex. ER with any acutely worsened symptoms.         Follow Up:   Return if symptoms worsen or fail to improve.    Patient was given instructions and counseling regarding her condition or for health maintenance advice. Please see specific information pulled into the AVS if appropriate.     Cara Llanos PA-C  [facility] [address] [place]     Please note that portions of this note may have been completed with a voice recognition program.

## 2024-08-20 ENCOUNTER — PATIENT MESSAGE (OUTPATIENT)
Dept: INTERNAL MEDICINE | Facility: CLINIC | Age: 39
End: 2024-08-20
Payer: COMMERCIAL

## 2024-08-20 DIAGNOSIS — K21.9 GASTROESOPHAGEAL REFLUX DISEASE WITHOUT ESOPHAGITIS: ICD-10-CM

## 2024-08-20 RX ORDER — ONDANSETRON 4 MG/1
4 TABLET, ORALLY DISINTEGRATING ORAL EVERY 8 HOURS PRN
Qty: 30 TABLET | Refills: 0 | Status: SHIPPED | OUTPATIENT
Start: 2024-08-20

## 2024-08-20 RX ORDER — FLUCONAZOLE 150 MG/1
150 TABLET ORAL ONCE
Qty: 1 TABLET | Refills: 0 | Status: SHIPPED | OUTPATIENT
Start: 2024-08-20 | End: 2024-08-21

## 2024-08-23 ENCOUNTER — PATIENT ROUNDING (BHMG ONLY) (OUTPATIENT)
Dept: INTERNAL MEDICINE | Facility: CLINIC | Age: 39
End: 2024-08-23
Payer: COMMERCIAL

## 2024-08-23 NOTE — PROGRESS NOTES
A [employer] message has been sent to patient for [place] [facility]

## 2024-08-27 ENCOUNTER — TELEPHONE (OUTPATIENT)
Dept: GASTROENTEROLOGY | Facility: CLINIC | Age: 39
End: 2024-08-27
Payer: COMMERCIAL

## 2024-08-27 ENCOUNTER — PATIENT MESSAGE (OUTPATIENT)
Dept: GASTROENTEROLOGY | Facility: CLINIC | Age: 39
End: 2024-08-27
Payer: COMMERCIAL

## 2024-08-27 DIAGNOSIS — B37.31 VAGINAL CANDIDA: Primary | ICD-10-CM

## 2024-08-27 DIAGNOSIS — A04.72 COLITIS, CLOSTRIDIUM DIFFICILE: Primary | ICD-10-CM

## 2024-08-27 DIAGNOSIS — R19.7 DIARRHEA, UNSPECIFIED TYPE: Primary | ICD-10-CM

## 2024-08-27 LAB
ADV 40+41 DNA STL QL NAA+NON-PROBE: NOT DETECTED
ASTRO TYP 1-8 RNA STL QL NAA+NON-PROBE: NOT DETECTED
C CAYETANENSIS DNA STL QL NAA+NON-PROBE: NOT DETECTED
C COLI+JEJ+UPSA DNA STL QL NAA+NON-PROBE: NOT DETECTED
C DIFF TOX GENS STL QL NAA+PROBE: DETECTED
CRYPTOSP DNA STL QL NAA+NON-PROBE: NOT DETECTED
E HISTOLYT DNA STL QL NAA+NON-PROBE: NOT DETECTED
EAEC PAA PLAS AGGR+AATA ST NAA+NON-PRB: NOT DETECTED
EC STX1+STX2 GENES STL QL NAA+NON-PROBE: NOT DETECTED
EPEC EAE GENE STL QL NAA+NON-PROBE: NOT DETECTED
ETEC LTA+ST1A+ST1B TOX ST NAA+NON-PROBE: NOT DETECTED
G LAMBLIA DNA STL QL NAA+NON-PROBE: NOT DETECTED
NOROVIRUS GI+II RNA STL QL NAA+NON-PROBE: NOT DETECTED
P SHIGELLOIDES DNA STL QL NAA+NON-PROBE: NOT DETECTED
RVA RNA STL QL NAA+NON-PROBE: NOT DETECTED
S ENT+BONG DNA STL QL NAA+NON-PROBE: NOT DETECTED
SAPO I+II+IV+V RNA STL QL NAA+NON-PROBE: NOT DETECTED
SHIGELLA SP+EIEC IPAH ST NAA+NON-PROBE: NOT DETECTED
V CHOL+PARA+VUL DNA STL QL NAA+NON-PROBE: NOT DETECTED
V CHOLERAE DNA STL QL NAA+NON-PROBE: NOT DETECTED
Y ENTEROCOL DNA STL QL NAA+NON-PROBE: NOT DETECTED

## 2024-08-27 PROCEDURE — 87507 IADNA-DNA/RNA PROBE TQ 12-25: CPT | Performed by: NURSE PRACTITIONER

## 2024-08-27 PROCEDURE — 87493 C DIFF AMPLIFIED PROBE: CPT | Performed by: NURSE PRACTITIONER

## 2024-08-27 RX ORDER — FLUCONAZOLE 150 MG/1
TABLET ORAL
Qty: 2 TABLET | Refills: 0 | Status: SHIPPED | OUTPATIENT
Start: 2024-08-27

## 2024-08-27 RX ORDER — VANCOMYCIN HYDROCHLORIDE 125 MG/1
125 CAPSULE ORAL 4 TIMES DAILY
Qty: 56 CAPSULE | Refills: 0 | Status: SHIPPED | OUTPATIENT
Start: 2024-08-27 | End: 2024-09-10

## 2024-08-27 NOTE — TELEPHONE ENCOUNTER
Also make sure to hold the Movantik, stool softeners or laxatives. Has she been taking any antibiotics? This can cause c-diff. I have ordered stool studies. She can go to the hospital lab to  the collection kit.     If she is unable to hold down liquids or her symptoms are severe or worsening, she needs to go to the ER to get evaluated.

## 2024-08-27 NOTE — TELEPHONE ENCOUNTER
Provider: ATA GONZÁLES    Caller: PARAS BECK    Relationship to Patient: SELF    [facility]    Phone Number: [phone]    Reason for Call: PT CALLED TO REQUEST GI PANEL DUE TO POSSIBLE CDIFF. DIARRHEA SINCE 8/22, UNABLE TO KEEP ANYTHING DOWN EVEN WATER.     PT HAS NOT BEEN TAKING THE LINZESS DUE TO THE ISSUE.    PT HAS BEEN TAKING IMODIUM AND ZOFRAN TO TRY TO HELP SITUATION    PLEASE CALL TO DISCUSS FURTHER OR SCHEDULE THE APPT.

## 2024-08-27 NOTE — TELEPHONE ENCOUNTER
Evelia Hutton MA 8/27/2024 3:13 PM EDT      ----- Message -----  From: Alicia Atkinson  Sent: 8/27/2024 2:50 PM EDT  To: [facility]  Subject: Antibiotic     Thank you for getting me tested today and calling me in some antibiotics. I was wanting to see if you could call me in that yeast infection pill again when I was on the last antibiotics I said a yeast infection that was pretty bad. It was my first one ever and I wanna make sure I never get it again. I am forever grateful for you and your team. Forrest has been nothing but amazing helping me today.

## 2024-08-27 NOTE — TELEPHONE ENCOUNTER
----- Message from Perez Germain sent at 8/27/2024 12:53 PM EDT -----  Let her know the c-diff came back positive. I sent in Vancomycin 4 times a day x 2 weeks. She can take a probiotic over the counter as well.

## 2024-08-27 NOTE — TELEPHONE ENCOUNTER
Pt notified of providers note. Pt asked if it would be appropriate to continue taking prednisone during vancomycin regimen.

## 2024-09-16 DIAGNOSIS — B37.31 VAGINAL CANDIDA: ICD-10-CM

## 2024-09-16 RX ORDER — FLUCONAZOLE 150 MG/1
TABLET ORAL
Qty: 2 TABLET | Refills: 0 | OUTPATIENT
Start: 2024-09-16

## 2024-09-17 DIAGNOSIS — B37.31 VAGINAL CANDIDA: ICD-10-CM

## 2024-09-17 RX ORDER — FLUCONAZOLE 150 MG/1
TABLET ORAL
Qty: 2 TABLET | Refills: 0 | Status: CANCELLED | OUTPATIENT
Start: 2024-09-17

## 2024-11-11 DIAGNOSIS — B37.31 VAGINAL CANDIDA: ICD-10-CM

## 2024-11-11 RX ORDER — IBUPROFEN 800 MG/1
800 TABLET, FILM COATED ORAL EVERY 8 HOURS PRN
Qty: 30 TABLET | Refills: 0 | OUTPATIENT
Start: 2024-11-11

## 2024-11-11 RX ORDER — FLUCONAZOLE 150 MG/1
TABLET ORAL
Qty: 2 TABLET | Refills: 0 | OUTPATIENT
Start: 2024-11-11

## 2025-01-02 RX ORDER — FUROSEMIDE 20 MG/1
20 TABLET ORAL DAILY
Qty: 90 TABLET | Refills: 3 | Status: SHIPPED | OUTPATIENT
Start: 2025-01-02

## 2025-01-12 ENCOUNTER — APPOINTMENT (OUTPATIENT)
Dept: CT IMAGING | Facility: HOSPITAL | Age: 40
End: 2025-01-12
Payer: COMMERCIAL

## 2025-01-12 ENCOUNTER — HOSPITAL ENCOUNTER (EMERGENCY)
Facility: HOSPITAL | Age: 40
Discharge: HOME | End: 2025-01-12
Attending: STUDENT IN AN ORGANIZED HEALTH CARE EDUCATION/TRAINING PROGRAM | Admitting: STUDENT IN AN ORGANIZED HEALTH CARE EDUCATION/TRAINING PROGRAM
Payer: COMMERCIAL

## 2025-01-12 VITALS
DIASTOLIC BLOOD PRESSURE: 66 MMHG | BODY MASS INDEX: 29.17 KG/M2 | WEIGHT: 135.2 LBS | HEIGHT: 57 IN | OXYGEN SATURATION: 95 % | HEART RATE: 97 BPM | SYSTOLIC BLOOD PRESSURE: 96 MMHG | RESPIRATION RATE: 18 BRPM | TEMPERATURE: 97.4 F

## 2025-01-12 DIAGNOSIS — S01.01XA LACERATION OF SCALP, INITIAL ENCOUNTER: ICD-10-CM
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
## 2025-01-12 DIAGNOSIS — M54.2 NECK PAIN: ICD-10-CM

## 2025-01-12 PROCEDURE — 25010000002 LIDOCAINE 1 % SOLUTION: Performed by: STUDENT IN AN ORGANIZED HEALTH CARE EDUCATION/TRAINING PROGRAM

## 2025-01-12 PROCEDURE — 72125 CT NECK SPINE W/O DYE: CPT

## 2025-01-12 PROCEDURE — 70450 CT HEAD/BRAIN W/O DYE: CPT

## 2025-01-12 PROCEDURE — 99284 EMERGENCY DEPT VISIT MOD MDM: CPT | Performed by: STUDENT IN AN ORGANIZED HEALTH CARE EDUCATION/TRAINING PROGRAM

## 2025-01-12 RX ORDER — LIDOCAINE HYDROCHLORIDE 10 MG/ML
10 INJECTION, SOLUTION INFILTRATION; PERINEURAL ONCE
Status: COMPLETED | OUTPATIENT
Start: 2025-01-12 | End: 2025-01-12

## 2025-01-12 RX ORDER — HYDROCODONE BITARTRATE AND ACETAMINOPHEN 5; 325 MG/1; MG/1
1 TABLET ORAL ONCE
Status: COMPLETED | OUTPATIENT
Start: 2025-01-12 | End: 2025-01-12

## 2025-01-12 RX ORDER — IBUPROFEN 800 MG/1
800 TABLET, FILM COATED ORAL EVERY 8 HOURS PRN
Qty: 15 TABLET | Refills: 0 | Status: SHIPPED | OUTPATIENT
Start: 2025-01-12

## 2025-01-12 RX ADMIN — LIDOCAINE HYDROCHLORIDE 10 ML: 10 INJECTION, SOLUTION INFILTRATION; PERINEURAL at 05:01

## 2025-01-12 RX ADMIN — HYDROCODONE BITARTRATE AND ACETAMINOPHEN 1 TABLET: 5; 325 TABLET ORAL at 04:16

## 2025-01-12 NOTE — Clinical Note
[facility] EMERGENCY DEPARTMENT  [address] [address]  Phone: [phone]    Alicia Atkinson was seen and treated in our emergency department on 1/12/2025.  She may return to work on 01/14/2025.         Thank you for choosing [facility].    Kei Cole DO

## 2025-01-12 NOTE — ED PROVIDER NOTES
[facility]  Emergency Department Encounter  Emergency Medicine Physician Note       Pt Name: Alicia Atkinson  MRN: [id number]  Pt :   1985  Room Number:  02SF02  Date of encounter:  2025  PCP: Megan Hunter APRN  ED Physician: Kei Cole DO    HPI:  Alicia Atkinson is a 39 y.o. female who presents to the ED with chief complaint of FALL.  This occurred at approximately 3 AM tonight.  Patient states that she was getting ready for work whenever she slipped over her slippers.  She hit the front of her head on the bathroom floor.  Had a brief loss of consciousness.  Sustained a laceration to the right forehead.  Bleeding controlled with direct pressure.  Ambulatory after the fall.  Woke up her  which prompted ED evaluation. Complains of headache and neck pain upon arrival.  No other traumatic injuries.  Denies anticoagulation use.  Tetanus up-to-date.    PAST MEDICAL HISTORY  Past Medical History:   Diagnosis Date    ADD (attention deficit disorder)     Anxiety     Body piercing     both ears    Borderline personality disorder     Chlamydia     Depression     Ectopic pregnancy     Fibroid     Fibromyalgia syndrome     Full dentures     Hepatitis C     hx of.  Negative after treatment.    Hypothyroidism     Lupus (systemic lupus erythematosus)     Migraine headache     Osteoarthritis     Pancreatitis     PMS (premenstrual syndrome)     Preeclampsia     RA (rheumatoid arthritis)     Recurrent pregnancy loss, antepartum condition or complication     Seizures     withdrawing from opiates/benzos    Tachycardia     Tattoos     Trauma     Varicella     White matter abnormality on MRI of brain      Current Outpatient Medications   Medication Instructions    Acetaminophen Extra Strength 1,000 mg, Oral, Every 8 Hours PRN    [place] Thyroid 30 mg, Oral, Daily    baclofen (LIORESAL) 20 mg, Oral, Nightly PRN    buprenorphine-naloxone (SUBOXONE) 8-2 MG per SL tablet 0.25 tablets,  Sublingual, Daily    furosemide (LASIX) 20 mg, Oral, Daily    gabapentin (NEURONTIN) 800 mg, Oral, 2 Times Daily    hydroxychloroquine (PLAQUENIL) 200 mg, Oral, Every 12 Hours Scheduled    ibuprofen (ADVIL,MOTRIN) 800 mg, Oral, Every 8 Hours PRN    Linzess 290 mcg, Oral, Every Morning Before Breakfast    Movantik 25 mg, Oral, Every Morning    Multiple Vitamins-Minerals (MULTIVITAMIN ADULT EXTRA C PO) 1 tablet, Oral    omeprazole (PRILOSEC) 40 mg, Oral, Daily    ondansetron ODT (ZOFRAN-ODT) 4 mg, Translingual, Every 8 Hours PRN    Sennosides (Chocolated Laxative) 15 MG chewable tablet Oral, 4 squares per week       PAST SURGICAL HISTORY  Past Surgical History:   Procedure Laterality Date    BREAST BIOPSY      COLONOSCOPY N/A 03/01/2021    Procedure: COLONOSCOPY WITH BIOPSY AND POLYPECTOMY;  Surgeon: Rui Palacios MD;  Location: [facility] ENDOSCOPY;  Service: Gastroenterology;  Laterality: N/A;    CYST REMOVAL  1991    back     D & C HYSTEROSCOPY ENDOMETRIAL ABLATION N/A 07/31/2023    Procedure: DILATATION AND CURETTAGE HYSTEROSCOPY NOVASURE ENDOMETRIAL ABLATION;  Surgeon: Rhianna Brambila MD;  Location: [facility] OR;  Service: Obstetrics/Gynecology;  Laterality: N/A;    DILATATION AND CURETTAGE      MOUTH SURGERY      teeth removal    TONSILLECTOMY AND ADENOIDECTOMY  2004    TUBAL ABDOMINAL LIGATION  2004       FAMILY HISTORY  Family History   Problem Relation Age of Onset    Dementia Mother     Stroke Mother     Diabetes Mother     COPD Mother     Migraines Mother     Alcohol abuse Mother     Drug abuse Mother     Rheum arthritis Father     Lymphoma Father     COPD Father     Drug abuse Brother     Pancreatitis Brother     Diabetes Brother     Drug abuse Brother     Migraines Daughter     Constipation Daughter     Diabetes Paternal Grandmother     Colon cancer Paternal Grandfather     Cancer Paternal Grandfather     Aneurysm Paternal Grandfather         brain    Cirrhosis Neg Hx     Liver cancer Neg Hx     Breast  cancer Neg Hx        SOCIAL HISTORY  Social History     Socioeconomic History    Marital status:    Tobacco Use    Smoking status: Former     Current packs/day: 0.00     Average packs/day: 1.5 packs/day for 30.0 years (45.0 ttl pk-yrs)     Types: Electronic Cigarette, Cigarettes     Start date:      Quit date:      Years since quittin.0    Smokeless tobacco: Never    Tobacco comments:     Vape   Vaping Use    Vaping status: Every Day    Start date: 2020    Substances: Nicotine, Flavoring    Devices: Disposable, Refillable tank   Substance and Sexual Activity    Alcohol use: Yes     Comment: social    Drug use: Not Currently     Types: Heroin     Comment: 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    Sexual activity: Defer     ALLERGIES  Iodine, Adhesive tape, and Latex    REVIEW OF SYSTEMS  All systems reviewed and negative except for those discussed in HPI.     PHYSICAL EXAM  ED Triage Vitals [25 0352]   Temp Heart Rate Resp BP SpO2   97.4 °F (36.3 °C) 108 19 114/81 97 %      Temp src Heart Rate Source Patient Position BP Location FiO2 (%)   Oral Monitor Sitting Left arm --     I have reviewed the triage vital signs and nursing notes.    General: Alert.  Nontoxic appearance.  No acute distress.  Head: Normocephalic.  Gapping, 2 cm linear laceration to the right frontal scalp.  Eyes: Pupils 2 mm.  PERRLA.  EOMI.  No scleral icterus.  Neck: C-collar in place.  Cardiovascular: Regular rate and rhythm.  No murmurs.  No rubs.  2+ distal pulses bilaterally.  Respiratory: Equal breath sounds bilaterally.  No rales.  No rhonchi.  No wheezing.  GI: Abdomen is soft.  Nondistended.  Nontender to palpation.  No rebound.  No guarding.  No CVA tenderness.  MSK: + Cervical spine tenderness.  No thoracic or lumbar midline tenderness.  No step-off. Moves all 4 extremities. No bony tenderness to the bilateral upper or lower extremities.  Neurologic: GCS 15. Oriented x 3.  No focal deficits.  Skin: No  "abrasions.  No ecchymosis.    LAB RESULTS  No results found for this or any previous visit (from the past 24 hours).    RADIOLOGY  CT Cervical Spine Without Contrast    Result Date: 1/12/2025  FINAL REPORT TECHNIQUE: null CLINICAL HISTORY: fall, posterior neck pain, worse at base of head COMPARISON: null FINDINGS: Exam: CT cervical spine without IV contrast. Comparison: None Findings: No acute fracture or dislocation. No significant degenerative changes. No high grade canal compromise. No suspicious osseous lesions. Paraspinal soft tissues unremarkable.     Impression: No acute fracture or dislocation. Authenticated and Electronically Signed by Aakash Hayward MD on 01/12/2025 04:42:43 AM    CT Head Without Contrast    Result Date: 1/12/2025  FINAL REPORT TECHNIQUE: null CLINICAL HISTORY: s/p slip and fall hitting forehead on ground. laceration to RT frontal region, Pain in RT side of head and posterior neck + LOC COMPARISON: null FINDINGS: Exam: CT head without contrast Comparison: None Findings: No acute stroke or bleed. Gray-white differentiation maintained. Ventricles are midline. No hydrocephalus. No sinus fluid levels. No significant mastoid air cell effusion. No acute skull fracture.     Impression: No acute intracranial pathology. Authenticated and Electronically Signed by Aakash Hayward MD on 01/12/2025 04:41:32 AM     PROCEDURES  Laceration Repair    Date/Time: 1/12/2025 5:00 AM    Performed by: Kei Cole DO  Authorized by: Kei Cole DO    Comments:      LACERATION REPAIR  Consent: Verbal consent obtained.  Risks and benefits: Risk, benefits, and alternatives were discussed.  Risks include: Pain, bleeding, infection, scarring.    Consent given by: Patient  Patient states understanding of the procedure being performed.  Patient's understanding of the procedure matches the consent given.  Patient identity confirmed: Armband  Timeout: Immediately prior to procedure a \"timeout\" was called to " verify the correct patient, procedure, equipment, and support staff as required.    Indication: Laceration  Location: Right frontal scalp  Laceration length: 2 cm  Foreign bodies: None  Anesthesia: Lidocaine 1% without epinephrine (2 mL)  Patient sedated: No  Patient was prepped and draped in the usual sterile fashion.  Irrigation solution: Saline  Irrigation method: Syringe  Amount of cleaning: Extensive  Debridement: None  Skin closure method: Staples  Number of staples: 3  Approximation: Close  Dressing: Antibiotic ointment, nonadhesive dressing applied by nursing.    Patient tolerated the procedure well with no immediate complications.        RISK STRATIFICATION    MEDICAL DECISION MAKING  39 y.o. female with past medical history listed above who presents status post ground-level mechanical fall with blunt head trauma and neck pain.    Vital signs in triage remarkable for tachycardia, otherwise within normal limits.    Based on clinical presentation and physical exam, differential diagnosis includes, but is not limited to, concussion, intracranial trauma, cervical spine trauma, neck sprain.  No clinical evidence of trauma to the chest, abdomen, pelvis, spine or long bones.    I have discussed the indication, risk, and alternatives of the following high risk medications: Oral Norco    Please see ED course below for my interpretation of the ED workup.  ED Course as of 01/12/25 2722   Sun Jan 12, 2025   3010 I have independently reviewed the imaging listed above.  My interpretations are listed below:    - CT head negative for intracranial hemorrhage.    - CT cervical spine negative for fracture or traumatic malalignment.     [JS]      ED Course User Index  [JS] Kei Cole DO     Medications administered in ED:  Medications   HYDROcodone-acetaminophen (NORCO) 5-325 MG per tablet 1 tablet (1 tablet Oral Given 1/12/25 9620)   lidocaine (XYLOCAINE) 1 % injection 10 mL (10 mL Injection Given by Other 1/12/25 1568)      Patient underwent primary laceration repair with staples at bedside.  Please see above procedure note for complete details.    On re-evaluation, patient resting comfortably.  States symptoms have improved following therapy. Vital signs remained stable on room air.  Patient was ambulatory in the ED with steady gait.  Able to tolerate oral intake appropriately.    I discussed the findings of the ED workup with the patient at bedside.  No clinical indication for admission.  I recommended outpatient follow-up with PCP.  Patient was deemed medically stable for discharge with close outpatient follow-up and strict ED return precautions. Patient agreeable with plan and disposition.    Home medications were reviewed.  Prescriptions for discharge: Ibuprofen    Chronic conditions affecting care: None    Social determinants of health impacting treatment or disposition: None    REPEAT VITAL SIGNS  AS OF 05:13 EST VITALS:  BP - 94/72  HR - 94  TEMP - 97.4 °F (36.3 °C) (Oral)  O2 SATS - 98%    DIAGNOSIS  Final diagnoses:   Fall, initial encounter   Laceration of scalp, initial encounter   Neck pain     DISPOSITION  ED Disposition       ED Disposition   Discharge    Condition   Stable    Comment   --               PATIENT REFERRALS  Megan Hunter, APRN  [address] [address] [phone]      PCP. 48 hours.            Please note that portions of this document were completed with voice recognition software.        Kei Cole DO  01/12/25 0457

## 2025-01-12 NOTE — DISCHARGE INSTRUCTIONS
Instructions from Dr. Cole:    It was a privilege being your ER physician today.    Please follow-up in clinic as we discussed.    Please have staples removed by a medical professional in 7 days.    Please return to the ER if you experience any worsening symptoms or for any other concerns.

## 2025-01-18 RX ORDER — LEVOTHYROXINE SODIUM 100 UG/1
100 TABLET ORAL DAILY
Qty: 90 TABLET | Refills: 3 | Status: SHIPPED | OUTPATIENT
Start: 2025-01-18

## 2025-01-22 ENCOUNTER — OFFICE VISIT (OUTPATIENT)
Dept: OBSTETRICS AND GYNECOLOGY | Facility: CLINIC | Age: 40
End: 2025-01-22
Payer: COMMERCIAL

## 2025-01-22 ENCOUNTER — OFFICE VISIT (OUTPATIENT)
Dept: INTERNAL MEDICINE | Facility: CLINIC | Age: 40
End: 2025-01-22
Payer: COMMERCIAL

## 2025-01-22 VITALS
OXYGEN SATURATION: 98 % | RESPIRATION RATE: 18 BRPM | SYSTOLIC BLOOD PRESSURE: 105 MMHG | HEART RATE: 93 BPM | BODY MASS INDEX: 28.48 KG/M2 | TEMPERATURE: 99.2 F | HEIGHT: 57 IN | WEIGHT: 132 LBS | DIASTOLIC BLOOD PRESSURE: 69 MMHG

## 2025-01-22 VITALS
SYSTOLIC BLOOD PRESSURE: 116 MMHG | DIASTOLIC BLOOD PRESSURE: 70 MMHG | BODY MASS INDEX: 29.12 KG/M2 | WEIGHT: 135 LBS | HEIGHT: 57 IN

## 2025-01-22 DIAGNOSIS — M79.7 FIBROMYALGIA: ICD-10-CM

## 2025-01-22 DIAGNOSIS — M54.2 NECK PAIN ON LEFT SIDE: ICD-10-CM

## 2025-01-22 DIAGNOSIS — Z01.419 WELL WOMAN EXAM WITH ROUTINE GYNECOLOGICAL EXAM: Primary | ICD-10-CM

## 2025-01-22 DIAGNOSIS — E03.9 ACQUIRED HYPOTHYROIDISM: Primary | ICD-10-CM

## 2025-01-22 DIAGNOSIS — M32.9 HISTORY OF SYSTEMIC LUPUS ERYTHEMATOSUS: ICD-10-CM

## 2025-01-22 DIAGNOSIS — Z98.51 H/O TUBAL LIGATION: ICD-10-CM

## 2025-01-22 DIAGNOSIS — Z87.39 PERSONAL HISTORY OF RHEUMATOID ARTHRITIS: ICD-10-CM

## 2025-01-22 DIAGNOSIS — Z98.890 HISTORY OF ENDOMETRIAL ABLATION: ICD-10-CM

## 2025-01-22 DIAGNOSIS — Z12.4 ENCOUNTER FOR PAPANICOLAOU SMEAR FOR CERVICAL CANCER SCREENING: ICD-10-CM

## 2025-01-22 DIAGNOSIS — K21.9 GASTROESOPHAGEAL REFLUX DISEASE WITHOUT ESOPHAGITIS: ICD-10-CM

## 2025-01-22 DIAGNOSIS — R53.83 FATIGUE, UNSPECIFIED TYPE: ICD-10-CM

## 2025-01-22 DIAGNOSIS — M25.50 PAIN IN JOINT INVOLVING MULTIPLE SITES: ICD-10-CM

## 2025-01-22 PROCEDURE — 99214 OFFICE O/P EST MOD 30 MIN: CPT | Performed by: NURSE PRACTITIONER

## 2025-01-22 RX ORDER — CYCLOBENZAPRINE HCL 5 MG
5 TABLET ORAL NIGHTLY PRN
Qty: 20 TABLET | Refills: 0 | Status: SHIPPED | OUTPATIENT
Start: 2025-01-22

## 2025-01-22 RX ORDER — ONDANSETRON 4 MG/1
4 TABLET, ORALLY DISINTEGRATING ORAL EVERY 8 HOURS PRN
Qty: 30 TABLET | Refills: 0 | Status: SHIPPED | OUTPATIENT
Start: 2025-01-22

## 2025-01-22 RX ORDER — HYDROXYCHLOROQUINE SULFATE 200 MG/1
200 TABLET, FILM COATED ORAL EVERY 12 HOURS SCHEDULED
Qty: 60 TABLET | Refills: 3 | Status: SHIPPED | OUTPATIENT
Start: 2025-01-22

## 2025-01-22 RX ORDER — MUPIROCIN 20 MG/G
1 OINTMENT TOPICAL 3 TIMES DAILY
Qty: 22 G | Refills: 1 | Status: SHIPPED | OUTPATIENT
Start: 2025-01-22

## 2025-01-22 NOTE — PROGRESS NOTES
Chief Complaint / Reason:      Chief Complaint   Patient presents with    ADHD     Follow up medication refills    Arthritis     Requesting referral    Neck Pain     Fall 1/13/2025 hitting head       Subjective     History of Present Illness  The patient is a 39-year-old female presenting for medication refills and to discuss arthritis, requesting a referral to [facility]. She is on Plaquenil. She reports neck pain following a fall on 01/12/2025, resulting in a head laceration. She also wants to discuss ADHD and is on Suboxone and gabapentin.    She experiences episodes of her ears turning purple, attributing it to lupus. Similar symptoms in her arms and legs started after beginning thyroid medication. She discontinued treatment with Dr. Morales and seeks a referral to [facility]. She has 2 doses of Plaquenil 200 mg twice daily remaining. The clinic requires new labs and records from Dr. Morales. She signed a release form for these records. She was placed on an infusion by Dr. Morales due to medication unavailability, resulting in 2 instances of C. difficile infection and 3 yeast infections. Lab work, including CBC, CRP, and PEGGY, was conducted every other infusion. She is concerned about potential kidney damage due to pain and limits ibuprofen intake. Diagnosed with rheumatoid arthritis, lupus, and fibromyalgia. Diagnosed with RA at 13.    Persistent neck pain following a fall on 01/12/2025, where she slipped and hit her head, resulting in brief loss of consciousness. Seen at [facility] ED, tetanus confirmed up to date, and laceration repaired with 3 staples on the right frontal scalp. Continues to hear a crunching sound when turning her head and feels her head needs to pop. Uses a heating pad and Epsom salt baths nightly. Previously took Flexeril and was prescribed baclofen, which she did not take.    Currently taking L-theanine for ADHD, finds it beneficial but does not  take it on days off.    Last thyroid check was some time ago. Not under the care of an endocrinologist, discharged by Rosibel Tamayo. Initially suspected to have Hashimoto's, but this was not the case. Unaware of thyroid issues until amy COVID-19 and mono, at which point her thyroid level was 0.01. Also experienced depression at that time.    Supplemental Information  Went today for her Pap smear.    SOCIAL HISTORY  She works in an emergency room.    MEDICATIONS  Current: Suboxone, gabapentin, Plaquenil, L-theanine. Discontinued: Baclofen.    IMMUNIZATIONS  Her tetanus was up to date.  Answers submitted by the patient for this visit:  Problem not listed (Submitted on 1/21/2025)  Chief Complaint: Other medical problem  Reason for appointment: Refills and have labs ordered and referral to [facility]  anorexia: No  joint pain: Yes  change in stool: No  headaches: Yes  joint swelling: Yes  vertigo: No  visual change: No  Medications tried: I fell on 1/13    History taken from: patient    PMH/FH/Social History were reviewed and updated appropriately in the electronic medical record.   Past Medical History:   Diagnosis Date    ADD (attention deficit disorder)     Anxiety     Body piercing     both ears    Borderline personality disorder     Chlamydia 2003    Coronary artery disease     In chart it says i have congestional heart failure    Depression     Ectopic pregnancy     Fibroid     Fibromyalgia syndrome     Fibromyalgia, primary     Full dentures     Hepatitis C     hx of.  Negative after treatment.    Hypothyroidism     Irritable bowel syndrome     Lupus (systemic lupus erythematosus)     Migraine headache     Osteoarthritis     Ovarian cyst     Pancreatitis     PMS (premenstrual syndrome)     Preeclampsia     RA (rheumatoid arthritis)     Recurrent pregnancy loss, antepartum condition or complication     Seizures     withdrawing from opiates/benzos    Tachycardia     Tattoos     Trauma      Urinary tract infection     Not current    Varicella     White matter abnormality on MRI of brain      Past Surgical History:   Procedure Laterality Date    BREAST BIOPSY      COLONOSCOPY N/A 2021    Procedure: COLONOSCOPY WITH BIOPSY AND POLYPECTOMY;  Surgeon: Rui Palacios MD;  Location: [facility] ENDOSCOPY;  Service: Gastroenterology;  Laterality: N/A;    CYST REMOVAL  1991    back     D & C HYSTEROSCOPY ENDOMETRIAL ABLATION N/A 2023    Procedure: DILATATION AND CURETTAGE HYSTEROSCOPY NOVASURE ENDOMETRIAL ABLATION;  Surgeon: Rhianna Brambila MD;  Location: [facility] OR;  Service: Obstetrics/Gynecology;  Laterality: N/A;    D & C WITH SUCTION      DILATATION AND CURETTAGE      MOUTH SURGERY      teeth removal    TONSILLECTOMY AND ADENOIDECTOMY      TUBAL ABDOMINAL LIGATION       Social History     Socioeconomic History    Marital status:    Tobacco Use    Smoking status: Former     Current packs/day: 0.00     Average packs/day: 1.5 packs/day for 30.0 years (45.0 ttl pk-yrs)     Types: Cigarettes, Electronic Cigarette     Start date:      Quit date:      Years since quittin.0    Smokeless tobacco: Never    Tobacco comments:     Vape   Vaping Use    Vaping status: Every Day    Start date: 2020    Substances: Nicotine, Flavoring    Devices: Disposable, Refillable tank   Substance and Sexual Activity    Alcohol use: Not Currently     Alcohol/week: 1.0 standard drink of alcohol     Types: 1 Glasses of wine per week     Comment: Occasionally, two glasses of wine every 2 weeks    Drug use: Not Currently     Types: Heroin, Marijuana     Comment: CleWas addicted through Dr’s for yrs clean over 10 yrs    Sexual activity: Yes     Partners: Male     Comment: Uterine ablation, failed tubal     Family History   Problem Relation Age of Onset    Dementia Mother     Stroke Mother         Massive Stroke    Diabetes Mother     COPD Mother     Migraines Mother     Alcohol abuse Mother      Drug abuse Mother     Pulmonary embolism Mother         Passed away from above    Rheum arthritis Father     Lymphoma Father     COPD Father     Melanoma Father     Coronary artery disease Father     Stroke Father         Multiple pin strokes    Hypertension Father     Drug abuse Brother     Pancreatitis Brother     Diabetes Brother     Drug abuse Brother     Migraines Daughter     Constipation Daughter     Diabetes Paternal Grandmother     Colon cancer Paternal Grandfather     Cancer Paternal Grandfather     Aneurysm Paternal Grandfather         brain    Cirrhosis Neg Hx     Liver cancer Neg Hx     Breast cancer Neg Hx        Review of Systems:   Review of Systems   Constitutional:  Positive for fatigue. Negative for chills, diaphoresis and fever.   HENT:  Positive for swollen glands. Negative for congestion and sore throat.    Respiratory:  Negative for cough.    Cardiovascular:  Negative for chest pain.   Gastrointestinal:  Positive for nausea. Negative for abdominal pain and vomiting.   Genitourinary:  Negative for dysuria.   Musculoskeletal:  Positive for neck pain. Negative for myalgias.   Skin:  Negative for rash.   Neurological:  Negative for weakness and numbness.         All other systems were reviewed and are negative.  Exceptions are noted in the subjective or above.      Objective     Vital Signs  Vitals:    01/22/25 1502   BP: 105/69   Pulse: 93   Resp: 18   Temp: 99.2 °F (37.3 °C)   SpO2: 98%       Body mass index is 28.56 kg/m².  BMI is >= 25 and <30. (Overweight) The following options were offered after discussion;: exercise counseling/recommendations and nutrition counseling/recommendations       Physical Exam  Vitals and nursing note reviewed.   Constitutional:       General: She is not in acute distress.     Appearance: She is well-developed.   HENT:      Head: Normocephalic and atraumatic.      Right Ear: Ear canal and external ear normal. Tympanic membrane is erythematous and bulging.      Left  Ear: Ear canal and external ear normal. Tympanic membrane is erythematous and bulging.      Nose: Mucosal edema present. No rhinorrhea.      Right Sinus: No maxillary sinus tenderness or frontal sinus tenderness.      Left Sinus: No maxillary sinus tenderness or frontal sinus tenderness.      Mouth/Throat:      Mouth: Mucous membranes are dry.      Pharynx: Posterior oropharyngeal erythema present.      Comments: PND    Eyes:      Conjunctiva/sclera: Conjunctivae normal.   Cardiovascular:      Rate and Rhythm: Normal rate and regular rhythm.      Pulses: Normal pulses.      Heart sounds: Normal heart sounds.   Pulmonary:      Effort: Pulmonary effort is normal. No respiratory distress.      Breath sounds: Normal breath sounds. No wheezing.   Chest:      Chest wall: No tenderness.   Abdominal:      General: Bowel sounds are normal.      Palpations: Abdomen is soft.   Musculoskeletal:         General: Tenderness (left neck), deformity and signs of injury present. Normal range of motion.   Lymphadenopathy:      Head:      Right side of head: No submental, submandibular or tonsillar adenopathy.      Left side of head: No submental, submandibular or tonsillar adenopathy.      Cervical: No cervical adenopathy.   Skin:     General: Skin is warm and dry.      Capillary Refill: Capillary refill takes less than 2 seconds.      Coloration: Skin is not pale.      Findings: Laceration present. No erythema or rash.             Comments: LENNY laceration right scalp, approximated    Neurological:      Mental Status: She is alert and oriented to person, place, and time.   Psychiatric:         Behavior: Behavior normal.         Thought Content: Thought content normal.         Judgment: Judgment normal.              Results Review:    I reviewed the patient's new clinical results.       Medication Review:     Current Outpatient Medications:     acetaminophen (TYLENOL) 500 MG tablet, Take 2 tablets by mouth Every 8 (Eight) Hours As  Needed for Mild Pain or Moderate Pain., Disp: 60 tablet, Rfl: 0    buprenorphine-naloxone (SUBOXONE) 8-2 MG per SL tablet, Place 0.25 tablets under the tongue Daily., Disp: , Rfl:     furosemide (LASIX) 20 MG tablet, Take 1 tablet by mouth Daily., Disp: 90 tablet, Rfl: 3    gabapentin (NEURONTIN) 800 MG tablet, Take 1 tablet by mouth 2 (Two) Times a Day., Disp: , Rfl: 3    hydroxychloroquine (PLAQUENIL) 200 MG tablet, Take 1 tablet by mouth Every 12 (Twelve) Hours., Disp: 60 tablet, Rfl: 3    ibuprofen (ADVIL,MOTRIN) 800 MG tablet, Take 1 tablet by mouth Every 8 (Eight) Hours As Needed for Mild Pain., Disp: 15 tablet, Rfl: 0    levothyroxine (Synthroid) 100 MCG tablet, Take 1 tablet by mouth Daily., Disp: 90 tablet, Rfl: 3    linaclotide (LINZESS) 290 MCG capsule capsule, Take 1 capsule by mouth Every Morning Before Breakfast., Disp: 90 capsule, Rfl: 3    Multiple Vitamins-Minerals (MULTIVITAMIN ADULT EXTRA C PO), Take 1 tablet by mouth., Disp: , Rfl:     Naloxegol Oxalate (Movantik) 25 MG tablet, Take 1 tablet by mouth Every Morning., Disp: 90 tablet, Rfl: 3    omeprazole (priLOSEC) 40 MG capsule, Take 1 capsule by mouth Daily., Disp: 90 capsule, Rfl: 3    ondansetron ODT (ZOFRAN-ODT) 4 MG disintegrating tablet, Place 1 tablet on the tongue Every 8 (Eight) Hours As Needed for Nausea or Vomiting., Disp: 30 tablet, Rfl: 0    cyclobenzaprine (FLEXERIL) 5 MG tablet, Take 1 tablet by mouth At Night As Needed for Muscle Spasms., Disp: 20 tablet, Rfl: 0    mupirocin (BACTROBAN) 2 % ointment, Apply 1 Application topically to the appropriate area as directed 3 (Three) Times a Day., Disp: 22 g, Rfl: 1    Diagnoses and all orders for this visit:    Acquired hypothyroidism  -     Thyroid Panel With TSH  -     Thyroid Antibodies    Gastroesophageal reflux disease without esophagitis  -     ondansetron ODT (ZOFRAN-ODT) 4 MG disintegrating tablet; Place 1 tablet on the tongue Every 8 (Eight) Hours As Needed for Nausea or  Vomiting.    Pain in joint involving multiple sites  -     PEGGY by IFA, Reflex 9-biomarkers profile  -     Rheumatoid Factor  -     C-reactive protein  -     Sedimentation rate, automated  -     Ambulatory Referral to Rheumatology    Fatigue, unspecified type  -     Comprehensive metabolic panel  -     CBC Auto Differential    Fibromyalgia  -     Ambulatory Referral to Rheumatology    Personal history of rheumatoid arthritis  -     Ambulatory Referral to Rheumatology    History of systemic lupus erythematosus  -     Ambulatory Referral to Rheumatology    Neck pain on left side  -     cyclobenzaprine (FLEXERIL) 5 MG tablet; Take 1 tablet by mouth At Night As Needed for Muscle Spasms.    Other orders  -     hydroxychloroquine (PLAQUENIL) 200 MG tablet; Take 1 tablet by mouth Every 12 (Twelve) Hours.  -     mupirocin (BACTROBAN) 2 % ointment; Apply 1 Application topically to the appropriate area as directed 3 (Three) Times a Day.      Assessment & Plan  1. Rheumatoid arthritis  - Referral to [facility]  - Continue Plaquenil 200 mg twice daily  - Order CBC, CRP, PEGGY, and kidney function tests  - Fast prior to tests    2. Lupus  - Referral to [facility]  - Continue Plaquenil 200 mg twice daily  - Order CBC, CRP, PEGGY, and kidney function tests  - Fast prior to tests    3. Fibromyalgia  - Referral to [facility]  - Continue Plaquenil 200 mg twice daily  - Order CBC, CRP, PEGGY, and kidney function tests  - Fast prior to tests    4. ADHD  - Schedule an appointment with a specialist for further evaluation and management    5. Thyroid disorder  - Order a full thyroid panel, including thyroid antibodies    6. Neck pain  - Perform stretching exercises  - Avoid chiropractic interventions  - Use heat and over-the-counter lidocaine patches  - Prescribed Flexeril 5 mg, take half a tablet at night  - Prescribed Bactroban    PROCEDURE  Laceration repair with 3 staples on the right frontal  scalp that have been removed .    Return if symptoms worsen or fail to improve.    ATA Johnson  01/22/2025    Patient or patient representative verbalized consent for the use of Ambient Listening during the visit with  ATA Johnson for chart documentation.

## 2025-01-23 LAB — REF LAB TEST METHOD: NORMAL

## 2025-01-27 LAB
ALBUMIN SERPL-MCNC: 4.4 G/DL (ref 3.5–5.2)
ALBUMIN/GLOB SERPL: 1.8 G/DL
ALP SERPL-CCNC: 43 U/L (ref 39–117)
ALT SERPL W P-5'-P-CCNC: 12 U/L (ref 1–33)
ANION GAP SERPL CALCULATED.3IONS-SCNC: 8.6 MMOL/L (ref 5–15)
AST SERPL-CCNC: 19 U/L (ref 1–32)
BASOPHILS # BLD AUTO: 0.04 10*3/MM3 (ref 0–0.2)
BASOPHILS NFR BLD AUTO: 0.8 % (ref 0–1.5)
BILIRUB SERPL-MCNC: 0.7 MG/DL (ref 0–1.2)
BUN SERPL-MCNC: 10 MG/DL (ref 6–20)
BUN/CREAT SERPL: 13.9 (ref 7–25)
CALCIUM SPEC-SCNC: 9 MG/DL (ref 8.6–10.5)
CHLORIDE SERPL-SCNC: 104 MMOL/L (ref 98–107)
CHOLEST SERPL-MCNC: 134 MG/DL (ref 0–200)
CHROMATIN AB SERPL-ACNC: <10 IU/ML (ref 0–14)
CO2 SERPL-SCNC: 26.4 MMOL/L (ref 22–29)
CREAT SERPL-MCNC: 0.72 MG/DL (ref 0.57–1)
CRP SERPL-MCNC: <0.3 MG/DL (ref 0–0.5)
DEPRECATED RDW RBC AUTO: 40.1 FL (ref 37–54)
EGFRCR SERPLBLD CKD-EPI 2021: 107.9 ML/MIN/1.73
EOSINOPHIL # BLD AUTO: 0.6 10*3/MM3 (ref 0–0.4)
EOSINOPHIL NFR BLD AUTO: 12 % (ref 0.3–6.2)
ERYTHROCYTE [DISTWIDTH] IN BLOOD BY AUTOMATED COUNT: 11.7 % (ref 12.3–15.4)
ERYTHROCYTE [SEDIMENTATION RATE] IN BLOOD: <1 MM/HR (ref 0–20)
GLOBULIN UR ELPH-MCNC: 2.5 GM/DL
GLUCOSE SERPL-MCNC: 88 MG/DL (ref 65–99)
HCT VFR BLD AUTO: 42.6 % (ref 34–46.6)
HDLC SERPL-MCNC: 76 MG/DL (ref 40–60)
HGB BLD-MCNC: 15 G/DL (ref 12–15.9)
IMM GRANULOCYTES # BLD AUTO: 0.01 10*3/MM3 (ref 0–0.05)
IMM GRANULOCYTES NFR BLD AUTO: 0.2 % (ref 0–0.5)
LDLC SERPL CALC-MCNC: 41 MG/DL (ref 0–100)
LDLC/HDLC SERPL: 0.53 {RATIO}
LYMPHOCYTES # BLD AUTO: 2.32 10*3/MM3 (ref 0.7–3.1)
LYMPHOCYTES NFR BLD AUTO: 46.3 % (ref 19.6–45.3)
MCH RBC QN AUTO: 33 PG (ref 26.6–33)
MCHC RBC AUTO-ENTMCNC: 35.2 G/DL (ref 31.5–35.7)
MCV RBC AUTO: 93.6 FL (ref 79–97)
MONOCYTES # BLD AUTO: 0.45 10*3/MM3 (ref 0.1–0.9)
MONOCYTES NFR BLD AUTO: 9 % (ref 5–12)
NEUTROPHILS NFR BLD AUTO: 1.59 10*3/MM3 (ref 1.7–7)
NEUTROPHILS NFR BLD AUTO: 31.7 % (ref 42.7–76)
NRBC BLD AUTO-RTO: 0 /100 WBC (ref 0–0.2)
PLATELET # BLD AUTO: 183 10*3/MM3 (ref 140–450)
PMV BLD AUTO: 9.2 FL (ref 6–12)
POTASSIUM SERPL-SCNC: 4 MMOL/L (ref 3.5–5.2)
PROT SERPL-MCNC: 6.9 G/DL (ref 6–8.5)
RBC # BLD AUTO: 4.55 10*6/MM3 (ref 3.77–5.28)
SODIUM SERPL-SCNC: 139 MMOL/L (ref 136–145)
T-UPTAKE NFR SERPL: 0.93 TBI (ref 0.8–1.3)
T4 FREE SERPL-MCNC: 1.41 NG/DL (ref 0.92–1.68)
T4 SERPL-MCNC: 7.15 MCG/DL (ref 4.5–11.7)
TRIGL SERPL-MCNC: 88 MG/DL (ref 0–150)
TSH SERPL DL<=0.05 MIU/L-ACNC: 2.18 UIU/ML (ref 0.27–4.2)
VLDLC SERPL-MCNC: 17 MG/DL (ref 5–40)
WBC NRBC COR # BLD AUTO: 5.01 10*3/MM3 (ref 3.4–10.8)

## 2025-01-27 PROCEDURE — 86800 THYROGLOBULIN ANTIBODY: CPT | Performed by: NURSE PRACTITIONER

## 2025-01-27 PROCEDURE — 84479 ASSAY OF THYROID (T3 OR T4): CPT | Performed by: NURSE PRACTITIONER

## 2025-01-27 PROCEDURE — 84439 ASSAY OF FREE THYROXINE: CPT | Performed by: NURSE PRACTITIONER

## 2025-01-27 PROCEDURE — 84436 ASSAY OF TOTAL THYROXINE: CPT | Performed by: NURSE PRACTITIONER

## 2025-01-27 PROCEDURE — 86038 ANTINUCLEAR ANTIBODIES: CPT | Performed by: NURSE PRACTITIONER

## 2025-01-27 PROCEDURE — 85652 RBC SED RATE AUTOMATED: CPT | Performed by: NURSE PRACTITIONER

## 2025-01-27 PROCEDURE — 86140 C-REACTIVE PROTEIN: CPT | Performed by: NURSE PRACTITIONER

## 2025-01-27 PROCEDURE — 86376 MICROSOMAL ANTIBODY EACH: CPT | Performed by: NURSE PRACTITIONER

## 2025-01-27 PROCEDURE — 80050 GENERAL HEALTH PANEL: CPT | Performed by: NURSE PRACTITIONER

## 2025-01-27 PROCEDURE — 86431 RHEUMATOID FACTOR QUANT: CPT | Performed by: NURSE PRACTITIONER

## 2025-01-27 PROCEDURE — 80061 LIPID PANEL: CPT | Performed by: NURSE PRACTITIONER

## 2025-01-28 LAB
ANA SER QL IF: NEGATIVE
LABORATORY COMMENT REPORT: NORMAL

## 2025-01-29 LAB
THYROGLOB AB SERPL-ACNC: 6.2 IU/ML (ref 0–0.9)
THYROPEROXIDASE AB SERPL-ACNC: 12 IU/ML (ref 0–34)

## 2025-02-04 PROBLEM — Z98.890 HISTORY OF ENDOMETRIAL ABLATION: Status: ACTIVE | Noted: 2025-02-04

## 2025-02-04 PROBLEM — Z98.51 H/O TUBAL LIGATION: Status: ACTIVE | Noted: 2025-02-04

## 2025-02-04 NOTE — PROGRESS NOTES
Annual Well Woman Visit    Subjective   Chief Complaint   Patient presents with    Gynecologic Exam     Last pap 10/19/21 WNL (), Last Mamm 24. Started cramping  4 months. Ablation done 3 years ago.      Alicia Atkinson is a 39 y.o. year old  presenting to be seen for an annual well woman visit.    No major complaints.  Endometrial ablation in .  She does have some cramping now.    She denies sexual dysfunction. She denies urinary complaints including incontinence.    OB Hx:  x 3, SAB x 3  Contraception: BTL  Pap smear:   Mammogram:   Colonoscopy:     Past Medical History:   Diagnosis Date    ADD (attention deficit disorder)     Anxiety     Body piercing     both ears    Borderline personality disorder     Chlamydia     Coronary artery disease     In chart it says i have congestional heart failure    Depression     Ectopic pregnancy     Fibroid     Fibromyalgia syndrome     Fibromyalgia, primary     Full dentures     Hepatitis C     hx of.  Negative after treatment.    Hypothyroidism     Irritable bowel syndrome     Lupus (systemic lupus erythematosus)     Migraine headache     Osteoarthritis     Ovarian cyst     Pancreatitis     PMS (premenstrual syndrome)     Preeclampsia     RA (rheumatoid arthritis)     Recurrent pregnancy loss, antepartum condition or complication     Seizures     withdrawing from opiates/benzos    Tachycardia     Tattoos     Trauma     Urinary tract infection     Not current    Varicella     White matter abnormality on MRI of brain      Past Surgical History:   Procedure Laterality Date    BREAST BIOPSY      COLONOSCOPY N/A 2021    Procedure: COLONOSCOPY WITH BIOPSY AND POLYPECTOMY;  Surgeon: Rui Palacios MD;  Location: [facility] ENDOSCOPY;  Service: Gastroenterology;  Laterality: N/A;    CYST REMOVAL  1991    back     D & C HYSTEROSCOPY ENDOMETRIAL ABLATION N/A 2023    Procedure: DILATATION AND CURETTAGE HYSTEROSCOPY NOVASURE  ENDOMETRIAL ABLATION;  Surgeon: Rhianna Brambila MD;  Location: [facility];  Service: Obstetrics/Gynecology;  Laterality: N/A;    D & C WITH SUCTION      DILATATION AND CURETTAGE      MOUTH SURGERY      teeth removal    TONSILLECTOMY AND ADENOIDECTOMY      TUBAL ABDOMINAL LIGATION       Family History   Problem Relation Age of Onset    Dementia Mother     Stroke Mother         Massive Stroke    Diabetes Mother     COPD Mother     Migraines Mother     Alcohol abuse Mother     Drug abuse Mother     Pulmonary embolism Mother         Passed away from above    Rheum arthritis Father     Lymphoma Father     COPD Father     Melanoma Father     Coronary artery disease Father     Stroke Father         Multiple pin strokes    Hypertension Father     Drug abuse Brother     Pancreatitis Brother     Diabetes Brother     Drug abuse Brother     Migraines Daughter     Constipation Daughter     Diabetes Paternal Grandmother     Colon cancer Paternal Grandfather     Cancer Paternal Grandfather     Aneurysm Paternal Grandfather         brain    Cirrhosis Neg Hx     Liver cancer Neg Hx     Breast cancer Neg Hx      Social History     Tobacco Use    Smoking status: Former     Current packs/day: 0.00     Average packs/day: 1.5 packs/day for 30.0 years (45.0 ttl pk-yrs)     Types: Cigarettes, Electronic Cigarette     Start date:      Quit date:      Years since quittin.0    Smokeless tobacco: Never    Tobacco comments:     Vape   Vaping Use    Vaping status: Every Day    Start date: 2020    Substances: Nicotine, Flavoring    Devices: Disposable, Refillable tank   Substance Use Topics    Alcohol use: Not Currently     Alcohol/week: 1.0 standard drink of alcohol     Types: 1 Glasses of wine per week     Comment: Occasionally, two glasses of wine every 2 weeks    Drug use: Not Currently     Types: Heroin, Marijuana     Comment: CleWas addicted through Dr’s for yrs clean over 10 yrs     (Not in a hospital  "admission)    Iodine, Adhesive tape, and Latex  Current Outpatient Medications on File Prior to Visit   Medication Sig Dispense Refill    acetaminophen (TYLENOL) 500 MG tablet Take 2 tablets by mouth Every 8 (Eight) Hours As Needed for Mild Pain or Moderate Pain. 60 tablet 0    buprenorphine-naloxone (SUBOXONE) 8-2 MG per SL tablet Place 0.25 tablets under the tongue Daily.      furosemide (LASIX) 20 MG tablet Take 1 tablet by mouth Daily. 90 tablet 3    gabapentin (NEURONTIN) 800 MG tablet Take 1 tablet by mouth 2 (Two) Times a Day.  3    ibuprofen (ADVIL,MOTRIN) 800 MG tablet Take 1 tablet by mouth Every 8 (Eight) Hours As Needed for Mild Pain. 15 tablet 0    levothyroxine (Synthroid) 100 MCG tablet Take 1 tablet by mouth Daily. 90 tablet 3    linaclotide (LINZESS) 290 MCG capsule capsule Take 1 capsule by mouth Every Morning Before Breakfast. 90 capsule 3    Multiple Vitamins-Minerals (MULTIVITAMIN ADULT EXTRA C PO) Take 1 tablet by mouth.      Naloxegol Oxalate (Movantik) 25 MG tablet Take 1 tablet by mouth Every Morning. 90 tablet 3    omeprazole (priLOSEC) 40 MG capsule Take 1 capsule by mouth Daily. 90 capsule 3    [DISCONTINUED] DULoxetine (CYMBALTA) 60 MG capsule Take 60 mg by mouth Daily.       No current facility-administered medications on file prior to visit.     Social History    Tobacco Use      Smoking status: Former        Packs/day: 0.00        Years: 1.5 packs/day for 30.0 years (45.0 ttl pk-yrs)        Types: Cigarettes, Electronic Cigarette        Start date:         Quit date: 2020        Years since quittin.0      Smokeless tobacco: Never      Tobacco comments: Vape      Review of Systems  Pertinent items are noted in HPI, all other systems were reviewed and negative       Objective   /70   Ht 144.8 cm (57\")   Wt 61.2 kg (135 lb)   BMI 29.21 kg/m²     Physical Exam:  General Appearance: alert, pleasant, appears stated age, interactive and cooperative  Breasts: Examined in " supine position  Symmetric without masses or skin dimpling  Nipples normal without inversion, lesions or discharge  There are no palpable axillary nodes  Abdomen: no masses, no hepatomegaly, no splenomegaly, soft non-tender, no guarding and no rebound tenderness    Pelvis:  Pelvic: Clinical staff was present for exam  External genitalia:  normal appearance of the external genitalia including Bartholin's and [place]'s glands.  :  urethral meatus normal; urethra normal:  Vagina:  normal pink mucosa without prolapse or lesions.  Cervix:  normal appearance.  Uterus:  normal size, shape and consistency.  Adnexa:  normal bimanual exam of the adnexa.  Rectal:  digital rectal exam not performed; anus visually normal appearing.       Assessment   Annual well woman exam with age appropriate screening  Previous endometrial ablation + BTL     Plan    No orders of the defined types were placed in this encounter.    Medications ordered: None    Procedures performed: Pap smear    - Mammogram: Repeat yearly  - Pap screening guidelines reviewed; pap smear collected today  - Yearly clinical breast and pelvic exams recommended regardless of pap recommendations  - Dexa scan: not indicated   - Colonoscopy: not indicated   - Contraception: BTL  - Screening: none    Follow up for annual visits    Roman Samuel MD  Obstetrics and Gynecology  [facility]

## 2025-03-06 DIAGNOSIS — M54.2 NECK PAIN ON LEFT SIDE: ICD-10-CM

## 2025-03-07 RX ORDER — CYCLOBENZAPRINE HCL 5 MG
5 TABLET ORAL NIGHTLY PRN
Qty: 20 TABLET | Refills: 0 | Status: SHIPPED | OUTPATIENT
Start: 2025-03-07

## 2025-03-07 RX ORDER — LEVOTHYROXINE SODIUM 100 UG/1
100 TABLET ORAL DAILY
Qty: 90 TABLET | Refills: 3 | OUTPATIENT
Start: 2025-03-07

## 2025-03-07 NOTE — TELEPHONE ENCOUNTER
Rx Refill Note  Requested Prescriptions     Pending Prescriptions Disp Refills    cyclobenzaprine (FLEXERIL) 5 MG tablet 20 tablet 0     Sig: Take 1 tablet by mouth At Night As Needed for Muscle Spasms.     Refused Prescriptions Disp Refills    levothyroxine (Synthroid) 100 MCG tablet 90 tablet 3     Sig: Take 1 tablet by mouth Daily.     Refused By: RAYMODN BAJWA     Reason for Refusal: Patient has requested refill too soon      Last office visit with prescribing clinician: 1/22/2025   Last telemedicine visit with prescribing clinician: Visit date not found   Next office visit with prescribing clinician: Visit date not found                         Would you like a call back once the refill request has been completed: [] Yes [] No    If the office needs to give you a call back, can they leave a voicemail: [] Yes [] No    Raymond Bajwa CMA  03/07/25, 07:49 EST

## 2025-03-19 DIAGNOSIS — K59.04 CHRONIC IDIOPATHIC CONSTIPATION: Primary | ICD-10-CM

## 2025-03-19 DIAGNOSIS — T40.2X5A THERAPEUTIC OPIOID INDUCED CONSTIPATION: ICD-10-CM

## 2025-03-19 DIAGNOSIS — K59.03 THERAPEUTIC OPIOID INDUCED CONSTIPATION: ICD-10-CM

## 2025-03-19 RX ORDER — LUBIPROSTONE 24 UG/1
24 CAPSULE ORAL 2 TIMES DAILY WITH MEALS
Qty: 60 CAPSULE | Refills: 3 | Status: SHIPPED | OUTPATIENT
Start: 2025-03-19

## 2025-03-19 NOTE — TELEPHONE ENCOUNTER
Let her know her insurance is requiring her to take the Amitiza twice a day instead of the Movantik.

## 2025-03-20 NOTE — TELEPHONE ENCOUNTER
Called and spoke with patient, she is willing to try the Amitiza, and  the copay card for Movantik if that does not work.

## 2025-03-24 DIAGNOSIS — M54.2 NECK PAIN ON LEFT SIDE: ICD-10-CM

## 2025-03-24 RX ORDER — CYCLOBENZAPRINE HCL 5 MG
5 TABLET ORAL NIGHTLY PRN
Qty: 30 TABLET | Refills: 0 | Status: SHIPPED | OUTPATIENT
Start: 2025-03-24

## 2025-03-27 DIAGNOSIS — M54.2 NECK PAIN: Primary | ICD-10-CM

## 2025-04-07 RX ORDER — HYDROXYCHLOROQUINE SULFATE 200 MG/1
200 TABLET, FILM COATED ORAL EVERY 12 HOURS SCHEDULED
Qty: 180 TABLET | Refills: 3 | Status: SHIPPED | OUTPATIENT
Start: 2025-04-07

## 2025-04-09 ENCOUNTER — PATIENT MESSAGE (OUTPATIENT)
Dept: INTERNAL MEDICINE | Facility: CLINIC | Age: 40
End: 2025-04-09
Payer: COMMERCIAL

## 2025-04-09 DIAGNOSIS — G43.911 INTRACTABLE MIGRAINE WITH STATUS MIGRAINOSUS, UNSPECIFIED MIGRAINE TYPE: Primary | ICD-10-CM

## 2025-04-10 DIAGNOSIS — T40.2X5A THERAPEUTIC OPIOID INDUCED CONSTIPATION: Chronic | ICD-10-CM

## 2025-04-10 DIAGNOSIS — K59.03 THERAPEUTIC OPIOID INDUCED CONSTIPATION: Chronic | ICD-10-CM

## 2025-04-23 DIAGNOSIS — G43.911 INTRACTABLE MIGRAINE WITH STATUS MIGRAINOSUS, UNSPECIFIED MIGRAINE TYPE: ICD-10-CM

## 2025-04-23 DIAGNOSIS — M54.2 NECK PAIN ON LEFT SIDE: ICD-10-CM

## 2025-04-24 RX ORDER — CYCLOBENZAPRINE HCL 5 MG
5 TABLET ORAL NIGHTLY PRN
Qty: 30 TABLET | Refills: 0 | Status: SHIPPED | OUTPATIENT
Start: 2025-04-24

## 2025-05-02 ENCOUNTER — TREATMENT (OUTPATIENT)
Dept: PHYSICAL THERAPY | Facility: CLINIC | Age: 40
End: 2025-05-02
Payer: COMMERCIAL

## 2025-05-02 DIAGNOSIS — M54.2 CERVICAL PAIN: Primary | ICD-10-CM

## 2025-05-02 PROCEDURE — 97535 SELF CARE MNGMENT TRAINING: CPT

## 2025-05-02 PROCEDURE — 97110 THERAPEUTIC EXERCISES: CPT

## 2025-05-02 PROCEDURE — 97161 PT EVAL LOW COMPLEX 20 MIN: CPT

## 2025-05-02 NOTE — PROGRESS NOTES
Physical Therapy Initial Evaluation and Plan of Care  [address] [place] [address]      Patient: Alicia Atkinson   : 1985  Diagnosis/ICD-10 Code:  Cervical pain [M54.2]  Referring practitioner: ATA Johnson  Date of Initial Visit: 2025  Today's Date: 2025  Patient seen for 1 session         Visit Diagnoses:    ICD-10-CM ICD-9-CM   1. Cervical pain  M54.2 723.1         Subjective Questionnaire: NDI:16      Subjective Evaluation    History of Present Illness  Mechanism of injury: Pt reports on  she was getting up to go into work and had a fall in her bathroom where she hit the front right side of her head. Notes since then she has been getting frequent headaches and hears a crunching sound when turning her head. Has a sharp pain if she turns her head too quickly. Notes she had one time where the pain went into her arm. Does have a history of migraines. Notes she had 2-3 HA a week.  Has difficulty sleeping but this is not new. Is a L side sleeper.  Working a lot can cause pain. Massage helps as does flexril. Watches her grandchildren.       Patient Occupation: manger of phlebotomy Quality of life: good    Pain  At best pain ratin  At worst pain rating: 3  Location: L side of the suboccipital and central spine at suboccipital  Quality: sharp and tight (grinding noise)  Relieving factors: medications  Aggravating factors: movement, prolonged positioning, repetitive movement and sleeping    Social Support  Lives with: spouse    Hand dominance: right    Diagnostic Tests  CT scan: normal    Patient Goals  Patient goals for therapy: decreased pain, increased motion and increased strength             Objective          Static Posture     Head  Forward.    Shoulders  Elevated.    Thoracic Spine  Hyperkyphosis.    Palpation   Left   Hypertonic in the levator scapulae and sternocleidomastoid.   Tenderness of the levator scapulae, scalenes, sternocleidomastoid and  suboccipitals.     Right   Hypertonic in the levator scapulae and sternocleidomastoid.     Tenderness   Cervical Spine   Tenderness in the spinous process.     Active Range of Motion   Cervical/Thoracic Spine   Cervical    Flexion: 45 degrees   Extension: 40 degrees   Left lateral flexion: 20 degrees with pain  Right lateral flexion: 20 degrees   Left rotation: 60 degrees with pain  Right rotation: 70 degrees     Strength/Myotome Testing     Left Shoulder     Planes of Motion   Flexion: 4   Abduction: 4     Right Shoulder     Planes of Motion   Flexion: 4   Abduction: 4       Pt education regarding anatomy and pathophysiology as well as the effect that posture has on the spine and supporting musculature. Discussed ways to check in with posture and work on postural correction to aid with cervical strain.    Assessment & Plan       Assessment  Impairments: abnormal muscle tone, abnormal or restricted ROM, activity intolerance, impaired physical strength and pain with function   Functional limitations: sleeping (Head movements)  Assessment details: Pt is a 40 YO F who presents to the clinic with cervical spine and cervical musculature pain. Pt with signs and symptoms consistent with cervical muscle strain and tightness. Pt may benefit from skilled PT for improving cervical spine/muscle pain, cervical ROM and posture to aid with return to normal daily activities.  Barriers to therapy: multiple co-morbidities, anxiety  Prognosis: fair    Goals  Plan Goals: SHORT TERM GOALS:     4 weeks  1. Pt independent with HEP  2. Pt to demonstrate pain free cervical AROM 50-75% of expected norms to allow for improved ability to perform ADL's  3. Pt to report not having any headaches related to neck pain for the past 3 days    LONG TERM GOALS:   8 weeks  1. Pt to demonstrate pain free cervical AROM % of expected norms to allow for improved safety when driving  2. Pt to demonstrate ability to lift 5# [place] with bilateral arm(s)  without increase in pain in the neck   3. Pt to report being able to work full shift or work in the home without increase in pain in the neck    Plan  Therapy options: will be seen for skilled therapy services  Planned modality interventions: cryotherapy, thermotherapy (hydrocollator packs) and ultrasound  Planned therapy interventions: abdominal trunk stabilization, flexibility, functional ROM exercises, home exercise program, joint mobilization, manual therapy, neuromuscular re-education, postural training, soft tissue mobilization, spinal/joint mobilization, strengthening, stretching and therapeutic activities  Frequency: 1x week  Duration in weeks: 8  Treatment plan discussed with: patient        History # of Personal Factors and/or Comorbidities: HIGH (3+)  Examination of Body System(s): # of elements: LOW (1-2)  Clinical Presentation: STABLE   Clinical Decision Making: LOW       Timed:         Manual Therapy:         mins  57416;     Therapeutic Exercise:   17      mins  19691;     Neuromuscular Nichol:      mins  38244;    Therapeutic Activity:          mins  42910;     Gait Training:          mins  94705;     Ultrasound:          mins  61054;    Ionto                                   mins   60181  Self Care                      9      mins   52661  Canalith Repos         mins 41798      Un-Timed:  Electrical Stimulation:         mins  33199 ( );  Dry Needling          mins self-pay  Traction          mins 90899  Low Eval   31      Mins  22352  Mod Eval          Mins  31477  High Eval                            Mins  25392        Timed Treatment:   26   mins   Total Treatment:     57   mins      PT: Shannan Fox PT     License Number: [id number]    Electronically signed by Shannan Fox PT, 05/02/25, 3:13 PM EDT    Certification Period: 5/3/2025 thru 7/31/2025  I certify that the therapy services are furnished while this patient is under my care.  The services outlined above are required by this patient,  and will be reviewed every 90 days.         Physician Signature:__________________________________________________    PHYSICIAN: Megan Hunter APRN  NPI: [id number]      DATE:     Please sign and return via fax to [email or web address] . Thank you, [facility].

## 2025-05-15 ENCOUNTER — TREATMENT (OUTPATIENT)
Dept: PHYSICAL THERAPY | Facility: CLINIC | Age: 40
End: 2025-05-15
Payer: COMMERCIAL

## 2025-05-15 DIAGNOSIS — M54.2 CERVICAL PAIN: Primary | ICD-10-CM

## 2025-05-15 PROCEDURE — 97110 THERAPEUTIC EXERCISES: CPT

## 2025-05-15 PROCEDURE — 97140 MANUAL THERAPY 1/> REGIONS: CPT

## 2025-05-15 NOTE — PROGRESS NOTES
Physical Therapy Daily Treatment Note  [address] [place] [address]      Patient: Alicia Atkinson   : 1985  Referring practitioner: ATA Johnson  Date of Initial Visit: Type: THERAPY  Noted: 2025  Today's Date: 5/15/2025  Patient seen for 2 sessions         Visit Diagnoses:    ICD-10-CM ICD-9-CM   1. Cervical pain  M54.2 723.1       Subjective   Patient reports slight improvement but notes she is having pain down in the midback.     Objective   See Exercise, Manual, and Modality Logs for complete treatment.     Assessment/Plan  Pt without report of increased pain during treatment today. Education with use of pt's image regarding her cervical positioning and the effect that has on her neck pain. Discussed that midback pain is normal as the paraspinal muscles work to bring the neck back into alignment. Continue with current POT progressing pt per tolerance.     Timed:         Manual Therapy: 24        mins  28551;     Therapeutic Exercise:     15    mins  46077;     Neuromuscular Nichol:      mins  21659;    Therapeutic Activity:          mins  61382;     Gait Training:           mins  77856;     Ultrasound:          mins  81869;    Ionto                                   mins   44741  Self Care                            mins   72024  Canalith Repos         mins 52670      Un-Timed:  Electrical Stimulation:        mins  56751 ( );  Dry Needling          mins self-pay  Traction          mins 60653      Timed Treatment:   39   mins   Total Treatment:     49   mins    Shannan Fox PT  [place] License: [id number]

## 2025-05-21 ENCOUNTER — TELEPHONE (OUTPATIENT)
Dept: PHYSICAL THERAPY | Facility: CLINIC | Age: 40
End: 2025-05-21

## 2025-05-21 NOTE — TELEPHONE ENCOUNTER
Caller: Alicia Atkinson    Relationship: Self         What was the call regarding: SHORT STAFFED HAS TO WORK OVER

## 2025-06-02 ENCOUNTER — OFFICE VISIT (OUTPATIENT)
Dept: GASTROENTEROLOGY | Facility: CLINIC | Age: 40
End: 2025-06-02
Payer: COMMERCIAL

## 2025-06-02 VITALS
BODY MASS INDEX: 27.69 KG/M2 | DIASTOLIC BLOOD PRESSURE: 72 MMHG | WEIGHT: 128 LBS | SYSTOLIC BLOOD PRESSURE: 112 MMHG | OXYGEN SATURATION: 99 %

## 2025-06-02 DIAGNOSIS — K59.03 THERAPEUTIC OPIOID INDUCED CONSTIPATION: Chronic | ICD-10-CM

## 2025-06-02 DIAGNOSIS — K59.00 CONSTIPATION, UNSPECIFIED CONSTIPATION TYPE: Chronic | ICD-10-CM

## 2025-06-02 DIAGNOSIS — K58.1 IRRITABLE BOWEL SYNDROME WITH CONSTIPATION: Primary | Chronic | ICD-10-CM

## 2025-06-02 DIAGNOSIS — T40.2X5A THERAPEUTIC OPIOID INDUCED CONSTIPATION: Chronic | ICD-10-CM

## 2025-06-02 DIAGNOSIS — K21.9 GASTROESOPHAGEAL REFLUX DISEASE WITHOUT ESOPHAGITIS: Chronic | ICD-10-CM

## 2025-06-02 DIAGNOSIS — K21.9 GASTROESOPHAGEAL REFLUX DISEASE WITHOUT ESOPHAGITIS: ICD-10-CM

## 2025-06-02 PROCEDURE — 99214 OFFICE O/P EST MOD 30 MIN: CPT | Performed by: NURSE PRACTITIONER

## 2025-06-02 RX ORDER — ONDANSETRON 4 MG/1
4 TABLET, ORALLY DISINTEGRATING ORAL EVERY 8 HOURS PRN
Qty: 30 TABLET | Refills: 1 | Status: SHIPPED | OUTPATIENT
Start: 2025-06-02

## 2025-06-02 RX ORDER — OMEPRAZOLE 40 MG/1
40 CAPSULE, DELAYED RELEASE ORAL DAILY
Qty: 90 CAPSULE | Refills: 3 | Status: SHIPPED | OUTPATIENT
Start: 2025-06-02

## 2025-06-02 RX ORDER — OMEPRAZOLE 40 MG/1
40 CAPSULE, DELAYED RELEASE ORAL DAILY
Qty: 90 CAPSULE | Refills: 3 | Status: CANCELLED | OUTPATIENT
Start: 2025-06-02

## 2025-06-02 NOTE — PROGRESS NOTES
Follow Up Note     Date: 2025   Patient Name: Alicia Atkinson  MRN: [id number]  : 1985     Primary Care Provider: Megan Hunter APRN     Chief Complaint   Patient presents with    Constipation     2025  History of Present Illness  The patient is a 39-year-old female who is here for a follow-up of constipation.    She reports that her constipation is well-managed when she adheres to her prescribed medication regimen. However, she has encountered an issue with her insurance provider, which has declined coverage for Movantik unless she explores alternative treatments.  She continues to take Linzess as prescribed which helps with her IBS-C, but she is on opioids and continues to have some constipation. She is no longer taking Amitiza, having discontinued it prior to starting Movantik. Movantik works better with her constipation when she takes it along with the Linzess.  She has tried MiraLAX and Trulance in the past, but they did not work as well as Movantik.  She also needs refills of Zofran and omeprazol.  She denies any GI bleeding.     Interval History:  2024  Alicia Atkinson is a 38 y.o. female who is here today for follow up for constipation. It has been doing much better. She is taking Linzess 290 daily along with Movantik, stool softeners and Benefiber with reasonable control of constipation. Reflux well controlled. No GI bleeding.     2021  She has chronic constipation since she was a teenager. Her bowel movements are like once a week. Associated bloating and occasional cramps diffuse. She takes laxative bar every week. She was on subaxone since year or so. She did mialax every day , did not help. Tried dulcolax when she is severely constipated that causes significant cramps. She also has nausea, but no vomiting.      She will see red blood in wipes occasionally. No recent change in bowel habit, hematochezia or melena.  Weight is stable. Pt denies  odynophagia or dysphagia. There  is history of acid reflux on ppi daily. There is no history of anemia. No prior history of EGD. Last colonoscopy was 2010 and had two polyps removed. Family history of colon cancer ; Grand father and uncle had colon cancer. No history of any abdominal surgery. Denies alcohol abuse or cigarette smoking. She still vapes.   She had recent  blood work done that was positive for hep c antibody. She had prior history of substance abuse and she is clean.   She has RA and was on hunira. Humira on hold now as her hep c antibody came out postive.     Subjective      Past Medical History:   Diagnosis Date    ADD (attention deficit disorder)     Anxiety     Body piercing     both ears    Borderline personality disorder     Chlamydia 2003    Coronary artery disease     In chart it says i have congestional heart failure    Depression     Ectopic pregnancy     Fibroid     Fibromyalgia syndrome     Fibromyalgia, primary     Full dentures     GERD (gastroesophageal reflux disease)     Hepatitis C     hx of.  Negative after treatment.    Hypothyroidism     Irritable bowel syndrome     Lupus (systemic lupus erythematosus)     Migraine headache     Osteoarthritis     Ovarian cyst     Pancreatitis     PMS (premenstrual syndrome)     Preeclampsia     RA (rheumatoid arthritis)     Recurrent pregnancy loss, antepartum condition or complication     Seizures     withdrawing from opiates/benzos    Tachycardia     Tattoos     Trauma     Urinary tract infection     Not current    Varicella     White matter abnormality on MRI of brain      Past Surgical History:   Procedure Laterality Date    BREAST BIOPSY      COLONOSCOPY N/A 03/01/2021    Procedure: COLONOSCOPY WITH BIOPSY AND POLYPECTOMY;  Surgeon: Rui Palacios MD;  Location: [facility] ENDOSCOPY;  Service: Gastroenterology;  Laterality: N/A;    CYST REMOVAL  1991    back     D & C HYSTEROSCOPY ENDOMETRIAL ABLATION N/A 07/31/2023    Procedure: DILATATION AND CURETTAGE HYSTEROSCOPY  NOVASURE ENDOMETRIAL ABLATION;  Surgeon: Rhianna Brambila MD;  Location: [facility];  Service: Obstetrics/Gynecology;  Laterality: N/A;    D & C WITH SUCTION      DILATATION AND CURETTAGE      MOUTH SURGERY      teeth removal    TONSILLECTOMY AND ADENOIDECTOMY      TUBAL ABDOMINAL LIGATION       Family History   Problem Relation Age of Onset    Dementia Mother     Stroke Mother         Massive Stroke    Diabetes Mother     COPD Mother     Migraines Mother     Alcohol abuse Mother     Drug abuse Mother     Pulmonary embolism Mother         Passed away from above    Rheum arthritis Father     Lymphoma Father     COPD Father     Melanoma Father     Coronary artery disease Father     Stroke Father         Multiple pin strokes    Hypertension Father     Drug abuse Brother     Pancreatitis Brother     Diabetes Brother     Drug abuse Brother     Migraines Daughter     Constipation Daughter     Diabetes Paternal Grandmother     Colon cancer Paternal Grandfather     Cancer Paternal Grandfather     Aneurysm Paternal Grandfather         brain    Cirrhosis Neg Hx     Liver cancer Neg Hx     Breast cancer Neg Hx      Social History     Socioeconomic History    Marital status:    Tobacco Use    Smoking status: Former     Current packs/day: 0.00     Average packs/day: 1.5 packs/day for 30.0 years (45.0 ttl pk-yrs)     Types: Cigarettes, Electronic Cigarette     Start date:      Quit date:      Years since quittin.4    Smokeless tobacco: Never    Tobacco comments:     Vape   Vaping Use    Vaping status: Every Day    Start date: 2020    Substances: Nicotine, Flavoring    Devices: Disposable, Refillable tank   Substance and Sexual Activity    Alcohol use: Not Currently     Alcohol/week: 1.0 standard drink of alcohol     Types: 1 Glasses of wine per week     Comment: Occasionally, two glasses of wine every 2 weeks    Drug use: Not Currently     Types: Heroin, Marijuana     Comment: CleWas addicted through  ’s for yrs clean over 10 yrs    Sexual activity: Yes     Partners: Male     Comment: Uterine ablation, failed tubal       Current Outpatient Medications:     acetaminophen (TYLENOL) 500 MG tablet, Take 2 tablets by mouth Every 8 (Eight) Hours As Needed for Mild Pain or Moderate Pain., Disp: 60 tablet, Rfl: 0    buprenorphine-naloxone (SUBOXONE) 8-2 MG per SL tablet, Place 0.25 tablets under the tongue Daily., Disp: , Rfl:     cyclobenzaprine (FLEXERIL) 5 MG tablet, Take 1 tablet by mouth At Night As Needed for Muscle Spasms., Disp: 30 tablet, Rfl: 0    furosemide (LASIX) 20 MG tablet, Take 1 tablet by mouth Daily., Disp: 90 tablet, Rfl: 3    gabapentin (NEURONTIN) 800 MG tablet, Take 1 tablet by mouth 2 (Two) Times a Day., Disp: , Rfl: 3    hydroxychloroquine (PLAQUENIL) 200 MG tablet, Take 1 tablet by mouth Every 12 (Twelve) Hours., Disp: 180 tablet, Rfl: 3    ibuprofen (ADVIL,MOTRIN) 800 MG tablet, Take 1 tablet by mouth Every 8 (Eight) Hours As Needed for Mild Pain., Disp: 15 tablet, Rfl: 0    levothyroxine (Synthroid) 100 MCG tablet, Take 1 tablet by mouth Daily., Disp: 90 tablet, Rfl: 3    linaclotide (LINZESS) 290 MCG capsule capsule, Take 1 capsule by mouth Every Morning Before Breakfast., Disp: 90 capsule, Rfl: 3    Multiple Vitamins-Minerals (MULTIVITAMIN ADULT EXTRA C PO), Take 1 tablet by mouth., Disp: , Rfl:     mupirocin (BACTROBAN) 2 % ointment, Apply 1 Application topically to the appropriate area as directed 3 (Three) Times a Day., Disp: 22 g, Rfl: 1    Naloxegol Oxalate (Movantik) 25 MG tablet, Take 1 tablet by mouth Every Morning., Disp: 90 tablet, Rfl: 3    omeprazole (priLOSEC) 40 MG capsule, Take 1 capsule by mouth Daily., Disp: 90 capsule, Rfl: 3    ondansetron ODT (ZOFRAN-ODT) 4 MG disintegrating tablet, Place 1 tablet on the tongue Every 8 (Eight) Hours As Needed for Nausea or Vomiting., Disp: 30 tablet, Rfl: 1    ubrogepant (Ubrelvy) 100 MG tablet, Take 1 tablet by mouth 1 (One) Time As  Needed (FOR MIGRAINE) for up to 2 doses., Disp: 10 tablet, Rfl: 0    levothyroxine (Synthroid) 100 MCG tablet, Take 1 tablet by mouth Daily., Disp: 90 tablet, Rfl: 3    Allergies   Allergen Reactions    Iodine Hives    Adhesive Tape Unknown - Low Severity     Blisters skin  Other reaction(s): Unknown - Low Severity      Latex Hives     The following portions of the patient's history were reviewed and updated as appropriate: allergies, current medications, past family history, past medical history, past social history, past surgical history and problem list.  Objective     Physical Exam  Vitals and nursing note reviewed.   Constitutional:       General: She is not in acute distress.     Appearance: Normal appearance. She is well-developed.   HENT:      Head: Normocephalic and atraumatic.      Mouth/Throat:      Mouth: Mucous membranes are not pale, not dry and not cyanotic.   Eyes:      General: Lids are normal.   Neck:      Trachea: Trachea normal.   Cardiovascular:      Rate and Rhythm: Normal rate.   Pulmonary:      Effort: Pulmonary effort is normal. No respiratory distress.      Breath sounds: Normal breath sounds.   Abdominal:      Tenderness: There is no abdominal tenderness.   Skin:     General: Skin is warm and dry.   Neurological:      Mental Status: She is alert and oriented to person, place, and time.   Psychiatric:         Mood and Affect: Mood normal.         Speech: Speech normal.         Behavior: Behavior normal. Behavior is cooperative.       Vitals:    06/02/25 1441   BP: 112/72   SpO2: 99%   Weight: 58.1 kg (128 lb)     Body mass index is 27.69 kg/m².     Results Review:   I reviewed the patient's new clinical results.    Lab Requisition on 03/31/2025   Component Date Value Ref Range Status    Mumps IgG 03/31/2025 128.0  Immune >10.9 AU/mL Final                                    Negative         <9.0                                  Equivocal  9.0 - 10.9                                  Positive         >10.9  A positive result generally indicates past exposure to  Mumps virus or previous vaccination.    Rubeola IgG 03/31/2025 15.7 (L)  Immune >16.4 AU/mL Final    A second sample should be collected and tested no less than 2-4 weeks.                                   Negative        <13.5                                   Equivocal 13.5 - 16.4                                   Positive        >16.4  Presence of antibodies to Rubeola is presumptive evidence  of immunity except when acute infection is suspected.      Comprehensive Metabolic Panel (01/27/2025 05:34)  CBC Auto Differential (01/27/2025 05:34)  Thyroid Panel With TSH (01/27/2025 05:34)  C-reactive protein (01/27/2025 05:34)  Sedimentation rate, automated (01/27/2025 05:34)  PEGGY by IFA, Reflex 9-biomarkers profile (01/27/2025 05:34)    US Abdomen Complete     Result Date: 2/25/2021  Cholecystectomy. Otherwise, unremarkable ultrasound of the abdomen.           Colonoscopy was completed by Dr. Palacios on 3/1/2021   - Preparation of the colon was fair.  - Four 2 to 3 mm polyps at the recto-sigmoid colon, removed with a cold biopsy forceps. Resected and retrieved. Clinically hyperplastic  - Stool in the entire examined colon.  - Biopsies were taken with a cold forceps from the right colon, left colon and transverse colon for evaluation of microscopic colitis.  - No endoscopic signs of colitis or ileitis  - Pathology pathology showed ileal mucosa with no significant histopathologic abnormality of the terminal ileal biopsy, random colon biopsies were normal, rectosigmoid polyps x4 were hyperplastic.    Assessment / Plan      1. Irritable bowel syndrome with constipation  2. Therapeutic opioid induced constipation  3. Constipation, unspecified constipation type  Likely has IBS-C at baseline with overlapping opioid-induced constipation as she is on Suboxone.  She is taking Linzess 290 mcg once a day, Movantik 25 mg once a day and stool softeners 2/day and  is having a soft daily bowel movement.  She denies any GI bleeding. Basic labs unremarkable.  TSH normal. Complete abdominal ultrasound unremarkable. She did not do CT scan.  Colonoscopy dated 3/1/2021 unremarkable.      Linzess 290 mcg daily.  Movantik 25 mg 1 p.o. daily.  Continue stool softeners daily.  May take over-the-counter laxative as needed.  Low FODMAP diet.  Will reorder CTAP if symptoms worsen.    - linaclotide (LINZESS) 290 MCG capsule capsule; Take 1 capsule by mouth Every Morning Before Breakfast.  Dispense: 90 capsule; Refill: 3  - Naloxegol Oxalate (Movantik) 25 MG tablet; Take 1 tablet by mouth Every Morning.  Dispense: 90 tablet; Refill: 3    4. Gastroesophageal reflux disease without esophagitis  Symptoms reasonably controlled with omeprazole 40 mg daily.  Has occasional nausea that comes and goes, no vomiting.  Denies any GI bleeding.  No history of difficulty swallowing.  Basic labs unremarkable.  TSH normal.  Abdominal ultrasound ended 2/25/2021 unremarkable.     Antireflux measures.  Omeprazole 40 mg daily.  Zofran as needed for nausea.  EGD if symptoms worsen.    - ondansetron ODT (ZOFRAN-ODT) 4 MG disintegrating tablet; Place 1 tablet on the tongue Every 8 (Eight) Hours As Needed for Nausea or Vomiting.  Dispense: 30 tablet; Refill: 1  - omeprazole (priLOSEC) 40 MG capsule; Take 1 capsule by mouth Daily.  Dispense: 90 capsule; Refill: 3    Previous History:  5. History of hepatitis C  6. Overweight BMI 27.47  She had been taking Mounjaro and has lost about 48 pounds since the end of January 2023.  This is intentional.  She is at her goal weight and is now off Mounjaro and is continuing to watch her diet and has maintained weight loss.  She has a history of hepatitis C and has completed treatment with Mavyret x8 weeks.  HCVRNA quantitative PCR with HCV not detected 12 weeks after completing treatment.  Previous hepatitis C FibroSure with F0/no fibrosis.  HIV test negative.  She is immune to  hepatitis A and hepatitis B.  Continue low-fat diet, exercise and weight reduction.     7. Encounter for screening for malignant neoplasm of colon  Colonoscopy dated 3/1/2021 with 4 polyps removed, hyperplastic.    She will need to have colonoscopy for colon cancer screening in 10 years, 2031, or sooner if needed.    Patient Instructions   Antireflux measures: Avoid fried, fatty foods, alcohol, chocolate, coffee, tea,  soft drinks, all carbonated beverages (including sparkling water), peppermint and spearmint, spicy foods, tomatoes and tomato based foods, onions, peppers, and garlic.   Other antireflux measures include weight reduction if overweight, avoiding tight clothing around the abdomen, elevating the head of the bed 6 inches with blocks under the head board, and don't drink or eat before going to bed and avoid lying down immediately after meals.  Omeprazole 40 mg 1 by mouth in the am 30 minutes before breakfast.  Zofran 4 mg 1 po every 8 hours as needed for nausea.  Low FODMAP diet - avoid all dairy. May use lactose free/dairy free alternatives.   Metamucil/Benefiber daily.   Linzess 290 mcg 1 po daily 30 minutes prior to breakfast.   Movantik 25 mg 1 po daily.  May take a laxative daily as needed.   Follow up: 1 year or sooner if needed       Low-FODMAP Eating Plan  FODMAP stands for fermentable oligosaccharides, disaccharides, monosaccharides, and polyols. These are sugars that are hard for some people to digest. A low-FODMAP eating plan may help some people who have irritable bowel syndrome (IBS) and certain other bowel (intestinal) diseases to manage their symptoms.  This meal plan can be complicated to follow. Work with a diet and nutrition specialist (dietitian) to make a low-FODMAP eating plan that is right for you. A dietitian can help make sure that you get enough nutrition from this diet.  What are tips for following this plan?  Reading food labels  Check labels for hidden FODMAPs such  as:  High-fructose syrup.  Honey.  Agave.  Natural fruit flavors.  Onion or garlic powder.  Choose low-FODMAP foods that contain 3-4 grams of fiber per serving.  Check food labels for serving sizes. Eat only one serving at a time to make sure FODMAP levels stay low.  Shopping  Shop with a list of foods that are recommended on this diet and make a meal plan.  Meal planning  Follow a low-FODMAP eating plan for up to 6 weeks, or as told by your health care provider or dietitian.  To follow the eating plan:  Eliminate high-FODMAP foods from your diet completely. Choose only low-FODMAP foods to eat. You will do this for 2-6 weeks.  Gradually reintroduce high-FODMAP foods into your diet one at a time. Most people should wait a few days before introducing the next new high-FODMAP food into their meal plan. Your dietitian can recommend how quickly you may reintroduce foods.  Keep a daily record of what and how much you eat and drink. Make note of any symptoms that you have after eating.  Review your daily record with a dietitian regularly to identify which foods you can eat and which foods you should avoid.  General tips  Drink enough fluid each day to keep your urine pale yellow.  Avoid processed foods. These often have added sugar and may be high in FODMAPs.  Avoid most dairy products, whole grains, and sweeteners.  Work with a dietitian to make sure you get enough fiber in your diet.  Avoid high FODMAP foods at meals to manage symptoms.     Recommended foods  Fruits  Bananas, oranges, tangerines, georgina, limes, blueberries, raspberries, strawberries, grapes, cantaloupe, honeydew melon, kiwi, papaya, passion fruit, and pineapple. Limited amounts of dried cranberries, banana chips, and shredded coconut.  Vegetables  Eggplant, zucchini, cucumber, peppers, green beans, bean sprouts, lettuce, arugula, kale, Swiss chard, spinach, miquel greens, bok alejandra, summer squash, potato, and tomato. Limited amounts of corn, carrot, and  "sweet potato. Green parts of scallions.  Grains  Gluten-free grains, such as rice, oats, buckwheat, quinoa, corn, polenta, and millet. Gluten-free pasta, bread, or cereal. Rice noodles. Corn tortillas.  Meats and other proteins  Unseasoned beef, pork, poultry, or fish. Eggs. Mascorro. Tofu (firm) and tempeh. Limited amounts of nuts and seeds, such as almonds, walnuts, brazil nuts, pecans, peanuts, nut butters, pumpkin seeds, mike seeds, and sunflower seeds.  Dairy  Lactose-free milk, yogurt, and kefir. Lactose-free cottage cheese and ice cream. Non-dairy milks, such as almond, coconut, hemp, and rice milk. Non-dairy yogurt. Limited amounts of goat cheese, brie, mozzarella, parmesan, swiss, and other hard cheeses.  Fats and oils  Butter-free spreads. Vegetable oils, such as olive, canola, and sunflower oil.  Seasoning and other foods  Artificial sweeteners with names that do not end in \"ol,\" such as aspartame, saccharine, and stevia. Maple syrup, white table sugar, raw sugar, brown sugar, and molasses. Mayonnaise, soy sauce, and tamari. Fresh basil, coriander, parsley, rosemary, and thyme.  Beverages  Water and mineral water. Sugar-sweetened soft drinks. Small amounts of orange juice or cranberry juice. Black and green tea. Most dry flaco. Coffee.  The items listed above may not be a complete list of foods and beverages you can eat. Contact a dietitian for more information.     Foods to avoid  Fruits  Fresh, dried, and juiced forms of apple, pear, watermelon, peach, plum, cherries, apricots, blackberries, boysenberries, figs, nectarines, and constanza. Avocado.  Vegetables  Chicory root, artichoke, asparagus, cabbage, snow peas, [place] sprouts, broccoli, sugar snap peas, mushrooms, celery, and cauliflower. Onions, garlic, leeks, and the white part of scallions.  Grains  Wheat, including kamut, durum, and semolina. Barley and bulgur. Couscous. Wheat-based cereals. Wheat noodles, bread, crackers, and pastries.  Meats and " other proteins  Fried or fatty meat. Sausage. Cashews and pistachios. Soybeans, baked beans, black beans, chickpeas, kidney beans, reid beans, navy beans, lentils, black-eyed peas, and split peas.  Dairy  Milk, yogurt, ice cream, and soft cheese. Cream and sour cream. Milk-based sauces. Custard. Buttermilk. Soy milk.  Seasoning and other foods  Any sugar-free gum or candy. Foods that contain artificial sweeteners such as sorbitol, mannitol, isomalt, or xylitol. Foods that contain honey, high-fructose corn syrup, or agave. Bouillon, vegetable stock, beef stock, and chicken stock. Garlic and onion powder. Condiments made with onion, such as hummus, chutney, pickles, relish, salad dressing, and salsa. Tomato paste.  Beverages  Chicory-based drinks. Coffee substitutes. Chamomile tea. Fennel tea. Sweet or fortified flaco such as port or josefa. Diet soft drinks made with isomalt, mannitol, maltitol, sorbitol, or xylitol. Apple, pear, and constanza juice. Juices with high-fructose corn syrup.  The items listed above may not be a complete list of foods and beverages you should avoid. Contact a dietitian for more information.  Summary  FODMAP stands for fermentable oligosaccharides, disaccharides, monosaccharides, and polyols. These are sugars that are hard for some people to digest.  A low-FODMAP eating plan is a short-term diet that helps to ease symptoms of certain bowel diseases.  The eating plan usually lasts up to 6 weeks. After that, high-FODMAP foods are reintroduced gradually and one at a time. This can help you find out which foods may be causing symptoms.  A low-FODMAP eating plan can be complicated. It is best to work with a dietitian who has experience with this type of plan.  This information is not intended to replace advice given to you by your health care provider. Make sure you discuss any questions you have with your health care provider.  Document Revised: 05/06/2021 Document Reviewed: 05/06/2021  [employer]  Patient Education © 2022 [employer]     ATA Lu  6/2/2025    Please note that portions of this note were completed with a voice recognition program.     Patient or patient representative verbalized consent for the use of Ambient Listening during the visit with  ATA Lu for chart documentation. 6/2/2025  14:59 EDT

## 2025-06-02 NOTE — PATIENT INSTRUCTIONS
Antireflux measures: Avoid fried, fatty foods, alcohol, chocolate, coffee, tea,  soft drinks, all carbonated beverages (including sparkling water), peppermint and spearmint, spicy foods, tomatoes and tomato based foods, onions, peppers, and garlic.   Other antireflux measures include weight reduction if overweight, avoiding tight clothing around the abdomen, elevating the head of the bed 6 inches with blocks under the head board, and don't drink or eat before going to bed and avoid lying down immediately after meals.  Omeprazole 40 mg 1 by mouth in the am 30 minutes before breakfast.  Zofran 4 mg 1 po every 8 hours as needed for nausea.  Low FODMAP diet - avoid all dairy. May use lactose free/dairy free alternatives.   Metamucil/Benefiber daily.   Linzess 290 mcg 1 po daily 30 minutes prior to breakfast.   Movantik 25 mg 1 po daily.  May take a laxative daily as needed.   Follow up: 1 year or sooner if needed       Low-FODMAP Eating Plan  FODMAP stands for fermentable oligosaccharides, disaccharides, monosaccharides, and polyols. These are sugars that are hard for some people to digest. A low-FODMAP eating plan may help some people who have irritable bowel syndrome (IBS) and certain other bowel (intestinal) diseases to manage their symptoms.  This meal plan can be complicated to follow. Work with a diet and nutrition specialist (dietitian) to make a low-FODMAP eating plan that is right for you. A dietitian can help make sure that you get enough nutrition from this diet.  What are tips for following this plan?  Reading food labels  Check labels for hidden FODMAPs such as:  High-fructose syrup.  Honey.  Agave.  Natural fruit flavors.  Onion or garlic powder.  Choose low-FODMAP foods that contain 3-4 grams of fiber per serving.  Check food labels for serving sizes. Eat only one serving at a time to make sure FODMAP levels stay low.  Shopping  Shop with a list of foods that are recommended on this diet and make a meal  plan.  Meal planning  Follow a low-FODMAP eating plan for up to 6 weeks, or as told by your health care provider or dietitian.  To follow the eating plan:  Eliminate high-FODMAP foods from your diet completely. Choose only low-FODMAP foods to eat. You will do this for 2-6 weeks.  Gradually reintroduce high-FODMAP foods into your diet one at a time. Most people should wait a few days before introducing the next new high-FODMAP food into their meal plan. Your dietitian can recommend how quickly you may reintroduce foods.  Keep a daily record of what and how much you eat and drink. Make note of any symptoms that you have after eating.  Review your daily record with a dietitian regularly to identify which foods you can eat and which foods you should avoid.  General tips  Drink enough fluid each day to keep your urine pale yellow.  Avoid processed foods. These often have added sugar and may be high in FODMAPs.  Avoid most dairy products, whole grains, and sweeteners.  Work with a dietitian to make sure you get enough fiber in your diet.  Avoid high FODMAP foods at meals to manage symptoms.     Recommended foods  Fruits  Bananas, oranges, tangerines, georgina, limes, blueberries, raspberries, strawberries, grapes, cantaloupe, honeydew melon, kiwi, papaya, passion fruit, and pineapple. Limited amounts of dried cranberries, banana chips, and shredded coconut.  Vegetables  Eggplant, zucchini, cucumber, peppers, green beans, bean sprouts, lettuce, arugula, kale, Swiss chard, spinach, miquel greens, bok alejandra, summer squash, potato, and tomato. Limited amounts of corn, carrot, and sweet potato. Green parts of scallions.  Grains  Gluten-free grains, such as rice, oats, buckwheat, quinoa, corn, polenta, and millet. Gluten-free pasta, bread, or cereal. Rice noodles. Corn tortillas.  Meats and other proteins  Unseasoned beef, pork, poultry, or fish. Eggs. Mascorro. Tofu (firm) and tempeh. Limited amounts of nuts and seeds, such as  "almonds, walnuts, brazil nuts, pecans, peanuts, nut butters, pumpkin seeds, mike seeds, and sunflower seeds.  Dairy  Lactose-free milk, yogurt, and kefir. Lactose-free cottage cheese and ice cream. Non-dairy milks, such as almond, coconut, hemp, and rice milk. Non-dairy yogurt. Limited amounts of goat cheese, brie, mozzarella, parmesan, swiss, and other hard cheeses.  Fats and oils  Butter-free spreads. Vegetable oils, such as olive, canola, and sunflower oil.  Seasoning and other foods  Artificial sweeteners with names that do not end in \"ol,\" such as aspartame, saccharine, and stevia. Maple syrup, white table sugar, raw sugar, brown sugar, and molasses. Mayonnaise, soy sauce, and tamari. Fresh basil, coriander, parsley, rosemary, and thyme.  Beverages  Water and mineral water. Sugar-sweetened soft drinks. Small amounts of orange juice or cranberry juice. Black and green tea. Most dry flaco. Coffee.  The items listed above may not be a complete list of foods and beverages you can eat. Contact a dietitian for more information.     Foods to avoid  Fruits  Fresh, dried, and juiced forms of apple, pear, watermelon, peach, plum, cherries, apricots, blackberries, boysenberries, figs, nectarines, and constanza. Avocado.  Vegetables  Chicory root, artichoke, asparagus, cabbage, snow peas, Egan sprouts, broccoli, sugar snap peas, mushrooms, celery, and cauliflower. Onions, garlic, leeks, and the white part of scallions.  Grains  Wheat, including kamut, durum, and semolina. Barley and bulgur. Couscous. Wheat-based cereals. Wheat noodles, bread, crackers, and pastries.  Meats and other proteins  Fried or fatty meat. Sausage. Cashews and pistachios. Soybeans, baked beans, black beans, chickpeas, kidney beans, reid beans, navy beans, lentils, black-eyed peas, and split peas.  Dairy  Milk, yogurt, ice cream, and soft cheese. Cream and sour cream. Milk-based sauces. Custard. Buttermilk. Soy milk.  Seasoning and other foods  Any " sugar-free gum or candy. Foods that contain artificial sweeteners such as sorbitol, mannitol, isomalt, or xylitol. Foods that contain honey, high-fructose corn syrup, or agave. Bouillon, vegetable stock, beef stock, and chicken stock. Garlic and onion powder. Condiments made with onion, such as hummus, chutney, pickles, relish, salad dressing, and salsa. Tomato paste.  Beverages  Chicory-based drinks. Coffee substitutes. Chamomile tea. Fennel tea. Sweet or fortified flaco such as port or josefa. Diet soft drinks made with isomalt, mannitol, maltitol, sorbitol, or xylitol. Apple, pear, and constanza juice. Juices with high-fructose corn syrup.  The items listed above may not be a complete list of foods and beverages you should avoid. Contact a dietitian for more information.  Summary  FODMAP stands for fermentable oligosaccharides, disaccharides, monosaccharides, and polyols. These are sugars that are hard for some people to digest.  A low-FODMAP eating plan is a short-term diet that helps to ease symptoms of certain bowel diseases.  The eating plan usually lasts up to 6 weeks. After that, high-FODMAP foods are reintroduced gradually and one at a time. This can help you find out which foods may be causing symptoms.  A low-FODMAP eating plan can be complicated. It is best to work with a dietitian who has experience with this type of plan.  This information is not intended to replace advice given to you by your health care provider. Make sure you discuss any questions you have with your health care provider.  Document Revised: 05/06/2021 Document Reviewed: 05/06/2021  Elsevier Patient Education © 2022 [employer]

## 2025-06-05 ENCOUNTER — TREATMENT (OUTPATIENT)
Dept: PHYSICAL THERAPY | Facility: CLINIC | Age: 40
End: 2025-06-05
Payer: COMMERCIAL

## 2025-06-05 DIAGNOSIS — M54.2 NECK PAIN ON LEFT SIDE: ICD-10-CM

## 2025-06-05 DIAGNOSIS — M54.2 CERVICAL PAIN: Primary | ICD-10-CM

## 2025-06-05 PROCEDURE — 97110 THERAPEUTIC EXERCISES: CPT

## 2025-06-05 PROCEDURE — 97140 MANUAL THERAPY 1/> REGIONS: CPT

## 2025-06-06 RX ORDER — CYCLOBENZAPRINE HCL 5 MG
5 TABLET ORAL NIGHTLY PRN
Qty: 30 TABLET | Refills: 0 | Status: SHIPPED | OUTPATIENT
Start: 2025-06-06

## 2025-06-06 NOTE — PROGRESS NOTES
Physical Therapy Daily Treatment Note  [address] [place] [address]      Patient: Alicia Atkinson   : 1985  Referring practitioner: ATA Johnson  Date of Initial Visit: Type: THERAPY  Noted: 2025  Today's Date: 2025  Patient seen for 3 sessions         Visit Diagnoses:    ICD-10-CM ICD-9-CM   1. Cervical pain  M54.2 723.1       Subjective   Patient reports the neck is pretty much still the same.     Objective   See Exercise, Manual, and Modality Logs for complete treatment.     Assessment/Plan  Pt without report of increased pain during treatment. Educated pt to continue to work on her posture especially scapular depression and cervical retraction to aid with neck tension. Continue with current POT progressing pt per tolerance.    Timed:         Manual Therapy: 23        mins  65863;     Therapeutic Exercise:     15    mins  35038;     Neuromuscular Nichol:      mins  23108;    Therapeutic Activity:          mins  53772;     Gait Training:           mins  73067;     Ultrasound:          mins  65846;    Ionto                                   mins   41866  Self Care                            mins   74087  Canalith Repos         mins [id number]      Un-Timed:  Electrical Stimulation:        mins  70810 ( );  Dry Needling          mins self-pay  Traction          mins [id number]      Timed Treatment:   38   mins   Total Treatment:     38   mins    Shannan Fox PT  [place] License: [id number]

## 2025-07-15 DIAGNOSIS — M54.2 NECK PAIN ON LEFT SIDE: ICD-10-CM

## 2025-07-15 RX ORDER — CYCLOBENZAPRINE HCL 5 MG
5 TABLET ORAL NIGHTLY PRN
Qty: 30 TABLET | Refills: 0 | Status: SHIPPED | OUTPATIENT
Start: 2025-07-15

## 2025-07-28 DIAGNOSIS — R39.9 UTI SYMPTOMS: Primary | ICD-10-CM

## 2025-07-28 DIAGNOSIS — M54.2 NECK PAIN ON LEFT SIDE: ICD-10-CM

## 2025-07-28 RX ORDER — CYCLOBENZAPRINE HCL 5 MG
5 TABLET ORAL NIGHTLY PRN
Qty: 30 TABLET | Refills: 0 | Status: SHIPPED | OUTPATIENT
Start: 2025-07-28

## 2025-07-31 ENCOUNTER — OFFICE VISIT (OUTPATIENT)
Age: 40
End: 2025-07-31
Payer: COMMERCIAL

## 2025-07-31 VITALS
HEART RATE: 57 BPM | HEIGHT: 59 IN | TEMPERATURE: 97.2 F | DIASTOLIC BLOOD PRESSURE: 70 MMHG | SYSTOLIC BLOOD PRESSURE: 108 MMHG | BODY MASS INDEX: 27.52 KG/M2 | WEIGHT: 136.5 LBS

## 2025-07-31 DIAGNOSIS — M79.7 FIBROMYALGIA: Primary | Chronic | ICD-10-CM

## 2025-07-31 DIAGNOSIS — M05.9 SEROPOSITIVE RHEUMATOID ARTHRITIS: Chronic | ICD-10-CM

## 2025-07-31 DIAGNOSIS — Z51.81 ENCOUNTER FOR THERAPEUTIC DRUG MONITORING: Chronic | ICD-10-CM

## 2025-07-31 DIAGNOSIS — M25.50 ARTHRALGIA, UNSPECIFIED JOINT: ICD-10-CM

## 2025-07-31 DIAGNOSIS — Z79.899 HIGH RISK MEDICATION USE: Chronic | ICD-10-CM

## 2025-07-31 DIAGNOSIS — R53.83 FATIGUE, UNSPECIFIED TYPE: ICD-10-CM

## 2025-07-31 RX ORDER — GABAPENTIN 800 MG/1
800 TABLET ORAL 3 TIMES DAILY
Qty: 90 TABLET | Refills: 5 | Status: SHIPPED | OUTPATIENT
Start: 2025-07-31

## 2025-07-31 RX ORDER — HYDROXYCHLOROQUINE SULFATE 200 MG/1
200 TABLET, FILM COATED ORAL DAILY
Qty: 30 TABLET | Refills: 5 | Status: SHIPPED | OUTPATIENT
Start: 2025-07-31

## 2025-07-31 NOTE — PROGRESS NOTES
Office Visit       Date: 07/31/2025   Patient Name: Alicia Atkinson  MRN: [id number]  YOB: 1985    Referring Physician: Megan Hunter AP*     Chief Complaint   Patient presents with    Fibromyalgia    Rheumatoid Arthritis       History of Present Illness: Alicia Atkinson is a 40 y.o. female who is here today at the request of Megan BERUMEN. The referral indicates that she has previously been diagnosed with fibromyalgia, RA, and lupus. She previously saw [facility].     She rates her pain today as zero/10 in severity. She has no significant morning stiffness. She has had back discomfort and neck stiffness. Her joints swell. No muscle pain or weakness.     She has headaches and numbness. She notes hair loss. No rash. No problems with Raynaud's. She is constipated. Her legs swell. No chest pain. No shortness of breath. No sicca symptoms. She is fatigued. She is cold intolerant. She does not sleep well. No lymphadenopathy. No abnormal bruising/bleeding. She has decreased libido.     Records from her prior rheumatologist, Dr. Hernandez, indicate she has a history of hepatitis C but was treated for this. Prior rheumatology notes indicate she was RF +.   In Dr. Hernandez's notes (her prior rheumatologist) on 2/21/24 I see no mention of a diagnosis of lupus. 1/27/25 her PEGGY test was normal. 2/22/24 PEGGY test was also negative. Dr. Hernandez indicates she was treating Ms. Atkinson for RA.     She has thyroid disease and is on thyroid medication. She is thyroglobulin normal.     She has history of C. Diff.       Medication/treatment/interventions tried include: Tylenol, ibuprofen, hydroxychloroquine, gabapentin, duloxetine, cyclobenzaprine, she saw [facility], Enbrel, Humira, she has done some physical therapy, She has seen GI specialists, methotrexate, Baclofen, Diclofenac gel, sertraline, IV Simponi, prednisone, she saw [facility]. [facility] (Rheumatologist), she has  seen endocrinology   Studies reviewed included:   1/27/25: Thyroglobulin antibody positive, PEGGY negative, RF negative, CRP normal, Sed rate normal  2/22/24: PEGGY negative. B12 normal. Vitamin D normal, CRP normal, Sed rate normal     Narrative & Impression   FINAL REPORT     TECHNIQUE:  null     CLINICAL HISTORY:  fall, posterior neck pain, worse at base of head     COMPARISON:  null     FINDINGS:  Exam: CT cervical spine without IV contrast.     Comparison: None     Findings:     No acute fracture or dislocation.     No significant degenerative changes.     No high grade canal compromise.     No suspicious osseous lesions.     Paraspinal soft tissues unremarkable.     IMPRESSION:  Impression:     No acute fracture or dislocation.     Authenticated and Electronically Signed by Aakash Hayward MD  on 01/12/2025 04:42:43 AM     Imaging    CT Cervical Spine Without Contrast (Order: [id number]) - 1/12/2025  Subjective     Review of Systems   Constitutional:  Positive for fatigue.   HENT: Negative.     Eyes: Negative.    Respiratory: Negative.     Cardiovascular:  Positive for leg swelling.   Gastrointestinal:  Positive for constipation and nausea.   Endocrine: Positive for cold intolerance.   Genitourinary:  Positive for breast discharge, breast pain and decreased libido.   Musculoskeletal:  Positive for arthralgias, back pain, joint swelling and neck stiffness.   Skin: Negative.    Allergic/Immunologic: Positive for environmental allergies.   Neurological:  Positive for numbness and headache.   Hematological: Negative.    Psychiatric/Behavioral:  Positive for agitation, decreased concentration, sleep disturbance and stress. The patient is nervous/anxious.    All other systems reviewed and are negative.       Past Medical History:   Diagnosis Date    ADD (attention deficit disorder)     Anxiety     Body piercing     both ears    Borderline personality disorder     Chlamydia 2003    Coronary artery disease     In chart it  says i have congestional heart failure    Depression     Ectopic pregnancy     Fibroid     Fibromyalgia syndrome     Fibromyalgia, primary     Full dentures     GERD (gastroesophageal reflux disease)     Hepatitis C     hx of.  Negative after treatment.    Hypothyroidism     Irritable bowel syndrome     Lupus (systemic lupus erythematosus)     Migraine headache     Osteoarthritis     Ovarian cyst     Pancreatitis     PMS (premenstrual syndrome)     Preeclampsia     RA (rheumatoid arthritis)     Recurrent pregnancy loss, antepartum condition or complication     Seizures     withdrawing from opiates/benzos    Tachycardia     Tattoos     Trauma     Urinary tract infection     Not current    Varicella     White matter abnormality on MRI of brain        Past Surgical History:   Procedure Laterality Date    BREAST BIOPSY      COLONOSCOPY N/A 03/01/2021    Procedure: COLONOSCOPY WITH BIOPSY AND POLYPECTOMY;  Surgeon: Rui Palacios MD;  Location: [facility] ENDOSCOPY;  Service: Gastroenterology;  Laterality: N/A;    CYST REMOVAL  1991    back     D & C HYSTEROSCOPY ENDOMETRIAL ABLATION N/A 07/31/2023    Procedure: DILATATION AND CURETTAGE HYSTEROSCOPY NOVASURE ENDOMETRIAL ABLATION;  Surgeon: Rhianna Brambila MD;  Location: [facility] OR;  Service: Obstetrics/Gynecology;  Laterality: N/A;    D & C WITH SUCTION      DILATATION AND CURETTAGE      MOUTH SURGERY      teeth removal    TONSILLECTOMY AND ADENOIDECTOMY  2004    TUBAL ABDOMINAL LIGATION  2004       Family History   Problem Relation Age of Onset    Dementia Mother     Stroke Mother         Massive Stroke    Diabetes Mother     COPD Mother     Migraines Mother     Alcohol abuse Mother     Drug abuse Mother     Pulmonary embolism Mother         Passed away from above    Rheum arthritis Father     Lymphoma Father     COPD Father     Melanoma Father     Coronary artery disease Father     Stroke Father         Multiple pin strokes    Hypertension Father     Drug abuse  Brother     Pancreatitis Brother     Diabetes Brother     Drug abuse Brother     Migraines Daughter     Constipation Daughter     Diabetes Paternal Grandmother     Colon cancer Paternal Grandfather     Cancer Paternal Grandfather     Aneurysm Paternal Grandfather         brain    Cirrhosis Neg Hx     Liver cancer Neg Hx     Breast cancer Neg Hx        Social History     Socioeconomic History    Marital status:    Tobacco Use    Smoking status: Former     Current packs/day: 0.00     Average packs/day: 1.5 packs/day for 30.0 years (45.0 ttl pk-yrs)     Types: Cigarettes, Electronic Cigarette     Start date:      Quit date:      Years since quittin.5     Passive exposure: Never    Smokeless tobacco: Never    Tobacco comments:     Vape   Vaping Use    Vaping status: Every Day    Start date: 2020    Substances: Nicotine, Flavoring    Devices: Disposable, Refillable tank   Substance and Sexual Activity    Alcohol use: Not Currently     Alcohol/week: 1.0 standard drink of alcohol     Types: 1 Glasses of wine per week     Comment: Occasionally, two glasses of wine every 2 weeks    Drug use: Not Currently     Types: Heroin, Marijuana     Comment: CleWas addicted through Dr’s for yrs clean over 10 yrs    Sexual activity: Yes     Partners: Male     Comment: Uterine ablation, failed tubal         Current Outpatient Medications:     acetaminophen (TYLENOL) 500 MG tablet, Take 2 tablets by mouth Every 8 (Eight) Hours As Needed for Mild Pain or Moderate Pain., Disp: 60 tablet, Rfl: 0    buprenorphine-naloxone (SUBOXONE) 8-2 MG per SL tablet, Place 0.25 tablets under the tongue Daily., Disp: , Rfl:     cyclobenzaprine (FLEXERIL) 5 MG tablet, Take 1 tablet by mouth At Night As Needed for Muscle Spasms., Disp: 30 tablet, Rfl: 0    furosemide (LASIX) 20 MG tablet, Take 1 tablet by mouth Daily., Disp: 90 tablet, Rfl: 3    gabapentin (NEURONTIN) 800 MG tablet, Take 1 tablet by mouth 3 (Three) Times a Day., Disp:  "90 tablet, Rfl: 5    hydroxychloroquine (PLAQUENIL) 200 MG tablet, Take 1 tablet by mouth Daily., Disp: 30 tablet, Rfl: 5    ibuprofen (ADVIL,MOTRIN) 800 MG tablet, Take 1 tablet by mouth Every 8 (Eight) Hours As Needed for Mild Pain., Disp: 15 tablet, Rfl: 0    levothyroxine (Synthroid) 100 MCG tablet, Take 1 tablet by mouth Daily., Disp: 90 tablet, Rfl: 3    linaclotide (LINZESS) 290 MCG capsule capsule, Take 1 capsule by mouth Every Morning Before Breakfast., Disp: 90 capsule, Rfl: 3    Multiple Vitamins-Minerals (MULTIVITAMIN ADULT EXTRA C PO), Take 1 tablet by mouth., Disp: , Rfl:     mupirocin (BACTROBAN) 2 % ointment, Apply 1 Application topically to the appropriate area as directed 3 (Three) Times a Day., Disp: 22 g, Rfl: 1    Naloxegol Oxalate (Movantik) 25 MG tablet, Take 1 tablet by mouth Every Morning., Disp: 90 tablet, Rfl: 3    omeprazole (priLOSEC) 40 MG capsule, Take 1 capsule by mouth Daily., Disp: 90 capsule, Rfl: 3    ondansetron ODT (ZOFRAN-ODT) 4 MG disintegrating tablet, Place 1 tablet on the tongue Every 8 (Eight) Hours As Needed for Nausea or Vomiting., Disp: 30 tablet, Rfl: 1    ubrogepant (Ubrelvy) 100 MG tablet, Take 1 tablet by mouth 1 (One) Time As Needed (FOR MIGRAINE) for up to 2 doses., Disp: 10 tablet, Rfl: 0    Allergies   Allergen Reactions    Iodine Hives    Adhesive Tape Unknown - Low Severity     Blisters skin  Other reaction(s): Unknown - Low Severity      Latex Hives       I reviewed the patient's chief complaint, history of present illness, review of systems, past medical history, surgical history, family history, social history, medications and allergy list.     Objective      Vitals:    07/31/25 0952   BP: 108/70   BP Location: Right arm   Patient Position: Sitting   Cuff Size: Adult   Pulse: 57   Temp: 97.2 °F (36.2 °C)   Weight: 61.9 kg (136 lb 8 oz)   Height: 149.2 cm (58.74\")   PainSc: 0-No pain     Body mass index is 27.81 kg/m².       Physical Exam       General: Well " appearing 40 year old  female. Not in distress. She is ambulating unassisted.   SKIN: No rashes. No alopecia. No subcutaneous nodules. No digital pits or ulcers. No sclerodactyly.   HEENT: NCAT. Conjunctiva clear, no photophobia. No oral or nasal ulcers. Hearing intact.    Pulmonary: Clear to auscultation bilaterally. No wheezing, rales, or rhonchi.  CV: Regular rate and rhythm. No murmurs, rubs, or gallops.   Psych: Normal mood and affect. Alert and oriented x 3.   Extremities: No cyanosis or edema.   Musculoskeletal: No joint swelling or tenderness to palpation. No warmth or erythema. Normal range of motion of the wrists, ankles, elbows, and knees.   Lymph: No palpable cervical adenopathy      Procedures    Assessment / Plan      Assessment & Plan  Arthralgia, unspecified joint  Medication/treatment/interventions tried include: Tylenol, ibuprofen, hydroxychloroquine, gabapentin, duloxetine, cyclobenzaprine, she saw [facility], Enbrel, Humira, she has done some physical therapy, She has seen GI specialists, methotrexate, Baclofen, Diclofenac gel, sertraline, IV Simponi, prednisone, she saw Dr. Jackson In [facility] (Rheumatologist), she has seen endocrinology   The referral indicates that she may have a history of lupus.   In Dr. Hernandez's notes (her prior rheumatologist) on 2/21/24 I see no mention of a diagnosis of lupus.   1/27/25 her PEGGY test was normal.   2/22/24 PEGGY test was also negative.   Most patient's with lupus are PEGGY test positive.   We will re-evaluate for SLE   The referral indicates she has RA. See below.   She has also been diagnosed with fibromyalgia. See below.   Follow up in 4-6 months  We gave her a handout on RA to review.   Orders:    QuantiFERON-TB Gold Plus (Li-Hep)    14.3.3 ETA, Rheum. Arthritis    CBC Auto Differential    Comprehensive Metabolic Panel    Cyclic Citrul Peptide Antibody, IgG / IgA    C-reactive Protein    RF Isotypes, IgG, IgA, IgM EIA    Sedimentation  Rate    Hepatitis Panel, Acute    PEGGY 12 Plus Profile (RDL)    PEGGY by IFA, Reflex to Titer and Pattern    XR Foot 3+ View Bilateral; Future    XR Hand 2 View Bilateral; Future    HCV Antibody Rfx To Qnt PCR; Future    gabapentin (NEURONTIN) 800 MG tablet; Take 1 tablet by mouth 3 (Three) Times a Day.    Urine Drug Screen - Urine, Clean Catch    Fatigue, unspecified type  Update TB and hepatitis tests   Orders:    QuantiFERON-TB Gold Plus (Li-Hep)    14.3.3 ETA, Rheum. Arthritis    CBC Auto Differential    Comprehensive Metabolic Panel    Cyclic Citrul Peptide Antibody, IgG / IgA    C-reactive Protein    RF Isotypes, IgG, IgA, IgM EIA    Sedimentation Rate    Hepatitis Panel, Acute    PEGGY 12 Plus Profile (RDL)    PEGGY by IFA, Reflex to Titer and Pattern    XR Foot 3+ View Bilateral; Future    XR Hand 2 View Bilateral; Future    HCV Antibody Rfx To Qnt PCR; Future    gabapentin (NEURONTIN) 800 MG tablet; Take 1 tablet by mouth 3 (Three) Times a Day.    Urine Drug Screen - Urine, Clean Catch    Fibromyalgia  1. H & P consistent with this diagnosis.   2. Encourage aerobic activity and sleep hygiene.  3. If she has not had a sleep study/consultation consider getting this done.   4. Tylenol PRN Is ok as directed  5. She has tried oral NSAIDS like ibuprofen  6. She has taken muscle relaxer's like Baclofen and cyclobenzaprine  7. She has taken gabapentin for neuropathic pain   8. She has tried taking duloxetine   9. She has tried taking sertraline  10. She previously saw [facility]   11. She has done some physical therapy   12. She saw a rheumatologist in [facility] (Dr. Jackson)     Orders:    QuantiFERON-TB Gold Plus (Li-Hep)    14.3.3 ETA, Rheum. Arthritis    CBC Auto Differential    Comprehensive Metabolic Panel    Cyclic Citrul Peptide Antibody, IgG / IgA    C-reactive Protein    RF Isotypes, IgG, IgA, IgM EIA    Sedimentation Rate    Hepatitis Panel, Acute    PEGGY 12 Plus Profile (RDL)    PEGGY by IFA, Reflex  to Titer and Pattern    XR Foot 3+ View Bilateral; Future    XR Hand 2 View Bilateral; Future    HCV Antibody Rfx To Qnt PCR; Future    gabapentin (NEURONTIN) 800 MG tablet; Take 1 tablet by mouth 3 (Three) Times a Day.    Urine Drug Screen - Urine, Clean Catch    Seropositive rheumatoid arthritis  1/27/25: Thyroglobulin antibody positive, PEGGY negative, RF negative, CRP normal, Sed rate normal  2/22/24: PEGGY negative. B12 normal. Vitamin D normal, CRP normal, Sed rate normal   Records from her prior rheumatologist, Dr. Hernandez, indicate she has a history of hepatitis C but was treated for this.   Prior rheumatology notes indicate she was RF +.   Dr. Hernandez indicates she was treating Ms. Atkinson for RA.   Most recently her RF test was negative on 1/27/25.   Will recheck labs and x-rays to evaluate status of RA.   Patient's with a history of hepatitis B or C can have +RF Tests   This does not always indicate that the patient has RA   This is what we would call a false positive result.  She has previously tried taking methotrexate  She has previously tried taking leflunomide  She has tried taking Enbrel  She has tried taking Humira.   Currently she is on hydroxychloroquine.   She had side effects with IV Simponi.   She takes steroids PRN For flare ups.   Orders:    QuantiFERON-TB Gold Plus (Li-Hep)    14.3.3 ETA, Rheum. Arthritis    CBC Auto Differential    Comprehensive Metabolic Panel    Cyclic Citrul Peptide Antibody, IgG / IgA    C-reactive Protein    RF Isotypes, IgG, IgA, IgM EIA    Sedimentation Rate    Hepatitis Panel, Acute    PEGGY 12 Plus Profile (RDL)    PEGGY by IFA, Reflex to Titer and Pattern    XR Foot 3+ View Bilateral; Future    XR Hand 2 View Bilateral; Future    HCV Antibody Rfx To Qnt PCR; Future    gabapentin (NEURONTIN) 800 MG tablet; Take 1 tablet by mouth 3 (Three) Times a Day.    Urine Drug Screen - Urine, Clean Catch    High risk medication use  Hydroxychloroquine for  mg PO once/day   Patient's  taking hydroxychloroquine should have an eye exam at least once/year to monitor for signs of medication toxicity    Orders:    QuantiFERON-TB Gold Plus (Li-Hep)    14.3.3 ETA, Rheum. Arthritis    CBC Auto Differential    Comprehensive Metabolic Panel    Cyclic Citrul Peptide Antibody, IgG / IgA    C-reactive Protein    RF Isotypes, IgG, IgA, IgM EIA    Sedimentation Rate    Hepatitis Panel, Acute    PEGGY 12 Plus Profile (RDL)    PEGGY by IFA, Reflex to Titer and Pattern    XR Foot 3+ View Bilateral; Future    XR Hand 2 View Bilateral; Future    HCV Antibody Rfx To Qnt PCR; Future    gabapentin (NEURONTIN) 800 MG tablet; Take 1 tablet by mouth 3 (Three) Times a Day.    Urine Drug Screen - Urine, Clean Catch    Encounter for therapeutic drug monitoring  Gabapentin 800 mg PO TID for fibromyalgia   Pain contract updated as of 7/31/25   Check MEMO and Drug screen as required.  Drug screen ordered today    Orders:    gabapentin (NEURONTIN) 800 MG tablet; Take 1 tablet by mouth 3 (Three) Times a Day.    Urine Drug Screen - Urine, Clean Catch      Follow Up:   Return in about 6 months (around 1/31/2026).    Obi Kerr DO  [facility]

## 2025-07-31 NOTE — ASSESSMENT & PLAN NOTE
1/27/25: Thyroglobulin antibody positive, PEGGY negative, RF negative, CRP normal, Sed rate normal  2/22/24: PEGGY negative. B12 normal. Vitamin D normal, CRP normal, Sed rate normal   Records from her prior rheumatologist, Dr. Hernandez, indicate she has a history of hepatitis C but was treated for this.   Prior rheumatology notes indicate she was RF +.   Dr. Hernandez indicates she was treating Ms. Atkinson for RA.   Most recently her RF test was negative on 1/27/25.   Will recheck labs and x-rays to evaluate status of RA.   Patient's with a history of hepatitis B or C can have +RF Tests   This does not always indicate that the patient has RA   This is what we would call a false positive result.  She has previously tried taking methotrexate  She has previously tried taking leflunomide  She has tried taking Enbrel  She has tried taking Humira.   Currently she is on hydroxychloroquine.   She had side effects with IV Simponi.   She takes steroids PRN For flare ups.   Orders:    QuantiFERON-TB Gold Plus (Li-Hep)    14.3.3 ETA, Rheum. Arthritis    CBC Auto Differential    Comprehensive Metabolic Panel    Cyclic Citrul Peptide Antibody, IgG / IgA    C-reactive Protein    RF Isotypes, IgG, IgA, IgM EIA    Sedimentation Rate    Hepatitis Panel, Acute    PEGGY 12 Plus Profile (RDL)    PEGGY by IFA, Reflex to Titer and Pattern    XR Foot 3+ View Bilateral; Future    XR Hand 2 View Bilateral; Future    HCV Antibody Rfx To Qnt PCR; Future    gabapentin (NEURONTIN) 800 MG tablet; Take 1 tablet by mouth 3 (Three) Times a Day.    Urine Drug Screen - Urine, Clean Catch

## 2025-07-31 NOTE — ASSESSMENT & PLAN NOTE
1. H & P consistent with this diagnosis.   2. Encourage aerobic activity and sleep hygiene.  3. If she has not had a sleep study/consultation consider getting this done.   4. Tylenol PRN Is ok as directed  5. She has tried oral NSAIDS like ibuprofen  6. She has taken muscle relaxer's like Baclofen and cyclobenzaprine  7. She has taken gabapentin for neuropathic pain   8. She has tried taking duloxetine   9. She has tried taking sertraline  10. She previously saw [facility]   11. She has done some physical therapy   12. She saw a rheumatologist in [facility] (Dr. Jackson)     Orders:    QuantiFERON-TB Gold Plus (Li-Hep)    14.3.3 ETA, Rheum. Arthritis    CBC Auto Differential    Comprehensive Metabolic Panel    Cyclic Citrul Peptide Antibody, IgG / IgA    C-reactive Protein    RF Isotypes, IgG, IgA, IgM EIA    Sedimentation Rate    Hepatitis Panel, Acute    PEGGY 12 Plus Profile (RDL)    PEGGY by IFA, Reflex to Titer and Pattern    XR Foot 3+ View Bilateral; Future    XR Hand 2 View Bilateral; Future    HCV Antibody Rfx To Qnt PCR; Future    gabapentin (NEURONTIN) 800 MG tablet; Take 1 tablet by mouth 3 (Three) Times a Day.    Urine Drug Screen - Urine, Clean Catch

## 2025-08-05 ENCOUNTER — PATIENT ROUNDING (BHMG ONLY) (OUTPATIENT)
Age: 40
End: 2025-08-05
Payer: COMMERCIAL

## 2025-08-21 ENCOUNTER — RESULTS FOLLOW-UP (OUTPATIENT)
Age: 40
End: 2025-08-21
Payer: COMMERCIAL

## 2025-08-21 LAB
ALBUMIN SERPL-MCNC: 4.4 G/DL (ref 3.5–5.2)
ALBUMIN/GLOB SERPL: 1.6 G/DL
ALP SERPL-CCNC: 46 U/L (ref 39–117)
ALT SERPL W P-5'-P-CCNC: 11 U/L (ref 1–33)
ANION GAP SERPL CALCULATED.3IONS-SCNC: 9.4 MMOL/L (ref 5–15)
AST SERPL-CCNC: 20 U/L (ref 1–32)
BASOPHILS # BLD AUTO: 0.03 10*3/MM3 (ref 0–0.2)
BASOPHILS NFR BLD AUTO: 0.6 % (ref 0–1.5)
BILIRUB SERPL-MCNC: 1 MG/DL (ref 0–1.2)
BUN SERPL-MCNC: 13 MG/DL (ref 6–20)
BUN/CREAT SERPL: 19.4 (ref 7–25)
CALCIUM SPEC-SCNC: 9.8 MG/DL (ref 8.6–10.5)
CHLORIDE SERPL-SCNC: 102 MMOL/L (ref 98–107)
CO2 SERPL-SCNC: 27.6 MMOL/L (ref 22–29)
CREAT SERPL-MCNC: 0.67 MG/DL (ref 0.57–1)
CRP SERPL-MCNC: <0.3 MG/DL (ref 0–0.5)
DEPRECATED RDW RBC AUTO: 39.2 FL (ref 37–54)
EGFRCR SERPLBLD CKD-EPI 2021: 112.1 ML/MIN/1.73
EOSINOPHIL # BLD AUTO: 0.41 10*3/MM3 (ref 0–0.4)
EOSINOPHIL NFR BLD AUTO: 8.3 % (ref 0.3–6.2)
ERYTHROCYTE [DISTWIDTH] IN BLOOD BY AUTOMATED COUNT: 11.5 % (ref 12.3–15.4)
ERYTHROCYTE [SEDIMENTATION RATE] IN BLOOD: 2 MM/HR (ref 0–20)
GLOBULIN UR ELPH-MCNC: 2.8 GM/DL
GLUCOSE SERPL-MCNC: 69 MG/DL (ref 65–99)
HCT VFR BLD AUTO: 40.3 % (ref 34–46.6)
HGB BLD-MCNC: 14.2 G/DL (ref 12–15.9)
IMM GRANULOCYTES # BLD AUTO: 0.01 10*3/MM3 (ref 0–0.05)
IMM GRANULOCYTES NFR BLD AUTO: 0.2 % (ref 0–0.5)
LYMPHOCYTES # BLD AUTO: 1.72 10*3/MM3 (ref 0.7–3.1)
LYMPHOCYTES NFR BLD AUTO: 34.7 % (ref 19.6–45.3)
MCH RBC QN AUTO: 32.6 PG (ref 26.6–33)
MCHC RBC AUTO-ENTMCNC: 35.2 G/DL (ref 31.5–35.7)
MCV RBC AUTO: 92.4 FL (ref 79–97)
MONOCYTES # BLD AUTO: 0.4 10*3/MM3 (ref 0.1–0.9)
MONOCYTES NFR BLD AUTO: 8.1 % (ref 5–12)
NEUTROPHILS NFR BLD AUTO: 2.38 10*3/MM3 (ref 1.7–7)
NEUTROPHILS NFR BLD AUTO: 48.1 % (ref 42.7–76)
NRBC BLD AUTO-RTO: 0 /100 WBC (ref 0–0.2)
PLATELET # BLD AUTO: 191 10*3/MM3 (ref 140–450)
PMV BLD AUTO: 9.5 FL (ref 6–12)
POTASSIUM SERPL-SCNC: 4.2 MMOL/L (ref 3.5–5.2)
PROT SERPL-MCNC: 7.2 G/DL (ref 6–8.5)
RBC # BLD AUTO: 4.36 10*6/MM3 (ref 3.77–5.28)
SODIUM SERPL-SCNC: 139 MMOL/L (ref 136–145)
WBC NRBC COR # BLD AUTO: 4.95 10*3/MM3 (ref 3.4–10.8)

## 2025-08-23 LAB
GAMMA INTERFERON BACKGROUND BLD IA-ACNC: 0.06 IU/ML
M TB IFN-G BLD-IMP: NEGATIVE
M TB IFN-G CD4+ BCKGRND COR BLD-ACNC: 0.06 IU/ML
M TB IFN-G CD4+CD8+ BCKGRND COR BLD-ACNC: 0.06 IU/ML
MITOGEN IGNF BCKGRD COR BLD-ACNC: >10 IU/ML
QUANTIFERON INCUBATION: NORMAL
SERVICE CMNT-IMP: NORMAL

## 2025-08-25 LAB
DIAGNOSTIC IMP SPEC-IMP: NORMAL
DIAGNOSTIC IMP SPEC-IMP: NORMAL
HAV IGM SERPL QL IA: NEGATIVE
HBV CORE IGM SERPL QL IA: NEGATIVE
HBV SURFACE AG SERPL QL IA: NEGATIVE
HCV AB SERPL QL IA: REACTIVE
HCV AB SERPL QL IA: REACTIVE
HCV RNA SERPL NAA+PROBE-ACNC: NORMAL IU/ML
HCV RNA SERPL NAA+PROBE-ACNC: NORMAL IU/ML
REF LAB TEST REF RANGE: NORMAL
REF LAB TEST REF RANGE: NORMAL

## 2025-08-26 LAB
RF IGA SER-ACNC: <7 U
RF IGG SER-ACNC: <7 U
RF IGM SER IA-ACNC: <7 U

## 2025-08-27 LAB — 14-3-3 ETA AG SER IA-MCNC: <0.2 NG/ML

## 2025-08-29 LAB
ANA PLUS 12 INTERPRETATION: ABNORMAL
ANA SER QL IF: POSITIVE
C3 SERPL-MCNC: 117 MG/DL (ref 90–180)
C4 SERPL-MCNC: 21 MG/DL (ref 10–40)
CARDIOLIPIN IGA SER IA-ACNC: <12 APL U/ML
CARDIOLIPIN IGG SER IA-ACNC: <15 GPL U/ML
CARDIOLIPIN IGM SER IA-ACNC: <13 MPL U/ML
CCP IGA+IGG SERPL IA-ACNC: <20 UNITS
CENTROMERE AB TITR SER IF: ABNORMAL {TITER}
CHROMATIN IGG SERPL-ACNC: <20 UNITS
DSDNA AB SER FARR-ACNC: <8 IU/ML
ENA SCL70 AB SER IA-ACNC: <20 UNITS
ENA SM AB SER-ACNC: <20 UNITS
ENA SS-A AB SER IA-ACNC: <20 UNITS
ENA SS-B AB SER IA-ACNC: <20 UNITS
LABORATORY COMMENT REPORT: ABNORMAL
MITOTIC SPINDLE APP AB TITR SERPL IF: ABNORMAL {TITER}
RHEUMATOID FACT SERPL-ACNC: <14 IU/ML
THYROPEROXIDASE AB SERPL-ACNC: <9 IU/ML
U1 SNRNP AB SER IA-ACNC: <20 UNITS

## (undated) DEVICE — PAD GRND REM POLYHESIVE A/ DISP

## (undated) DEVICE — ST IRR CYSTO W/SPK 77IN LF

## (undated) DEVICE — VLV SXN AIR/H2O ORCAPOD3 1P/U STRL

## (undated) DEVICE — GLV SURG BIOGEL M LTX PF 6 1/2

## (undated) DEVICE — SOL IRR H2O BTL 1000ML STRL

## (undated) DEVICE — HYBRID TUBING/CAP SET FOR OLYMPUS® SCOPES: Brand: ERBE

## (undated) DEVICE — MARKR SKIN W/RULR

## (undated) DEVICE — PROB ABL ENDOMTRL NOVASURE/G4 IMPEDENCE 1P/U

## (undated) DEVICE — ENDOSCOPY PORT CONNECTOR FOR OLYMPUS® SCOPES: Brand: ERBE

## (undated) DEVICE — RICH MINOR LITHOTOMY: Brand: MEDLINE INDUSTRIES, INC.

## (undated) DEVICE — FRCP BIOP COLD ENDOJAW ALLGTR W/NDL 2.8X2300MM BLU

## (undated) DEVICE — SOL NACL 0.9PCT 1000ML

## (undated) DEVICE — Device

## (undated) DEVICE — LUBE JELLY PK/2.75GM STRL BX/144